# Patient Record
Sex: MALE | Race: WHITE | Employment: OTHER | URBAN - METROPOLITAN AREA
[De-identification: names, ages, dates, MRNs, and addresses within clinical notes are randomized per-mention and may not be internally consistent; named-entity substitution may affect disease eponyms.]

---

## 2018-07-18 RX ORDER — VITAMIN E 268 MG
400 CAPSULE ORAL DAILY
COMMUNITY

## 2018-07-18 RX ORDER — M-VIT,TX,IRON,MINS/CALC/FOLIC 27MG-0.4MG
1 TABLET ORAL DAILY
COMMUNITY
End: 2022-08-16

## 2018-07-18 RX ORDER — RANITIDINE 150 MG/1
150 CAPSULE ORAL 2 TIMES DAILY
COMMUNITY
End: 2022-08-16

## 2018-07-18 RX ORDER — ASCORBIC ACID 500 MG
500 TABLET ORAL DAILY
COMMUNITY

## 2018-07-20 ENCOUNTER — HOSPITAL ENCOUNTER (OUTPATIENT)
Age: 83
Discharge: HOME | End: 2018-07-20
Payer: COMMERCIAL

## 2018-07-20 LAB
EKG ATRIAL RATE: 99 BPM
EKG P-R INTERVAL: 146 MS
EKG Q-T INTERVAL: 330 MS
EKG QRS DURATION: 76 MS
EKG QTC CALCULATION (BAZETT): 423 MS
EKG R AXIS: -21 DEGREES
EKG T AXIS: -5 DEGREES
EKG VENTRICULAR RATE: 99 BPM

## 2018-07-20 PROCEDURE — 93005 ELECTROCARDIOGRAM TRACING: CPT | Performed by: ANESTHESIOLOGY

## 2018-07-24 ENCOUNTER — ANESTHESIA EVENT (OUTPATIENT)
Dept: OPERATING ROOM | Age: 83
End: 2018-07-24
Payer: COMMERCIAL

## 2018-07-24 ASSESSMENT — LIFESTYLE VARIABLES: SMOKING_STATUS: 0

## 2018-07-24 NOTE — ANESTHESIA PRE PROCEDURE
Department of Anesthesiology  Preprocedure Note       Name:  Shon Boggs   Age:  80 y.o.  :  1934                                          MRN:  [id number]         Date:  2018      Surgeon: Abrahan Melgoza):  Kvng Thomson DPM    Procedure: Procedure(s):  FUSION 1ST MPJ RIGHT FOOT    Medications prior to admission:   Prior to Admission medications    Medication Sig Start Date End Date Taking? Authorizing Provider   Multiple Vitamins-Minerals (THERAPEUTIC MULTIVITAMIN-MINERALS) tablet Take 1 tablet by mouth daily   Yes Historical Provider, MD   Calcium-Magnesium-Vitamin D (CALCIUM 1200+D3 PO) Take by mouth 2 times daily   Yes Historical Provider, MD   vitamin E 400 UNIT capsule Take 400 Units by mouth daily   Yes Historical Provider, MD   vitamin B-12 (CYANOCOBALAMIN) 250 MCG tablet Take 250 mcg by mouth daily   Yes Historical Provider, MD   Biotin 55572 MCG TBDP Take by mouth daily   Yes Historical Provider, MD   Garlic 4201 MG CAPS Take by mouth daily   Yes Historical Provider, MD   Omega 3-6-9 Fatty Acids (TRIPLE OMEGA-3-6-9) CAPS Take by mouth daily   Yes Historical Provider, MD   Misc Natural Products (GLUCOS-CHONDROIT-MSM COMPLEX PO) Take by mouth daily   Yes Historical Provider, MD   vitamin C (ASCORBIC ACID) 500 MG tablet Take 500 mg by mouth daily   Yes Historical Provider, MD   ranitidine (ZANTAC) 150 MG capsule Take 150 mg by mouth 2 times daily   Yes Historical Provider, MD       Current medications:    No current facility-administered medications for this encounter.       Current Outpatient Prescriptions   Medication Sig Dispense Refill    Multiple Vitamins-Minerals (THERAPEUTIC MULTIVITAMIN-MINERALS) tablet Take 1 tablet by mouth daily      Calcium-Magnesium-Vitamin D (CALCIUM 1200+D3 PO) Take by mouth 2 times daily      vitamin E 400 UNIT capsule Take 400 Units by mouth daily      vitamin B-12 (CYANOCOBALAMIN) 250 MCG tablet Take 250 mcg by mouth daily      Biotin 52766 MCG TBDP Take by mouth daily      Garlic 2031 MG CAPS Take by mouth daily      Omega 3-6-9 Fatty Acids (TRIPLE OMEGA-3-6-9) CAPS Take by mouth daily      Misc Natural Products (GLUCOS-CHONDROIT-MSM COMPLEX PO) Take by mouth daily      vitamin C (ASCORBIC ACID) 500 MG tablet Take 500 mg by mouth daily      ranitidine (ZANTAC) 150 MG capsule Take 150 mg by mouth 2 times daily         Allergies: Allergies   Allergen Reactions    Bee Venom Anaphylaxis       Problem List:  There is no problem list on file for this patient. Past Medical History:        Diagnosis Date    Arthritis     GERD (gastroesophageal reflux disease)        Past Surgical History:        Procedure Laterality Date    COLECTOMY      for polyps    COLONOSCOPY      ELBOW SURGERY      tendon reattachment    EYE SURGERY Bilateral     cataracts    TURP         Social History:    Social History   Substance Use Topics    Smoking status: Former Smoker     Years: 10.00     Types: Pipe    Smokeless tobacco: Never Used    Alcohol use Yes      Comment: occas                                Counseling given: Not Answered      Vital Signs (Current):   Vitals:    07/18/18 1328 07/18/18 1331   Weight: 224 lb (101.6 kg) 225 lb (102.1 kg)   Height: 5' 8\" (1.727 m)                                               BP Readings from Last 3 Encounters:   09/27/17 131/81       NPO Status:                                                                                 BMI:   Wt Readings from Last 3 Encounters:   09/27/17 232 lb (105.2 kg)     Body mass index is 34.21 kg/m². CBC: No results found for: WBC, RBC, HGB, HCT, MCV, RDW, PLT    CMP: No results found for: NA, K, CL, CO2, BUN, CREATININE, GFRAA, AGRATIO, LABGLOM, GLUCOSE, PROT, CALCIUM, BILITOT, ALKPHOS, AST, ALT    POC Tests: No results for input(s): POCGLU, POCNA, POCK, POCCL, POCBUN, POCHEMO, POCHCT in the last 72 hours.     Coags: No results found for: PROTIME, INR, APTT    HCG (If Applicable): No results found for: PREGTESTUR, PREGSERUM, HCG, HCGQUANT     ABGs: No results found for: PHART, PO2ART, DUH3EKF, MRM0PBC, BEART, A9EDWOTP     Type & Screen (If Applicable):  No results found for: [facility]    Anesthesia Evaluation  Patient summary reviewed  Airway: Mallampati: II  TM distance: >3 FB   Neck ROM: full  Mouth opening: > = 3 FB Dental:    (+) upper dentures and lower dentures      Pulmonary: breath sounds clear to auscultation      (-) COPD and not a current smoker (ex 10 yr smoker)                          ROS comment: Former smoker   Cardiovascular:  Exercise tolerance: good (>4 METS),       (-) hypertension, past MI and CAD    ECG reviewed  Rhythm: regular  Rate: normal                 ROS comment: EKG=Sinus rhythm with occasional premature ventricular complexes  Otherwise normal ECG  No previous ECGs available  Confirmed by Iraida Rehman ([id number]) on 7/20/2018 10:56:59 PM    cleared by PCP     Neuro/Psych:   Negative Neuro/Psych ROS              GI/Hepatic/Renal:   (+) GERD: well controlled,          ROS comment: Colon resection  TURP. Endo/Other:    (+) : arthritis:., .                 Abdominal:   (+) obese,         Vascular: negative vascular ROS. Anesthesia Plan      MAC     ASA 3     (Medical clearance on chart)  Induction: intravenous. MIPS: Postoperative opioids intended and Prophylactic antiemetics administered. Anesthetic plan and risks discussed with patient and spouse. Plan discussed with CRNA. Laura Mulligan MD   7/24/2018      DOS STAFF ADDENDUM:    Pt seen and examined, chart reviewed (including anesthesia, drug and allergy history). Anesthetic plan, risks, benefits, alternatives, and personnel involved discussed with patient. Patient verbalized an understanding and agrees to proceed. Plan discussed with care team members and agreed upon.     Idalia Emmanuel MD  Staff Anesthesiologist  7:12 AM

## 2018-07-25 ENCOUNTER — HOSPITAL ENCOUNTER (OUTPATIENT)
Age: 83
Setting detail: OUTPATIENT SURGERY
Discharge: HOME | End: 2018-07-25
Attending: PODIATRIST | Admitting: PODIATRIST
Payer: COMMERCIAL

## 2018-07-25 ENCOUNTER — ANESTHESIA (OUTPATIENT)
Dept: OPERATING ROOM | Age: 83
End: 2018-07-25
Payer: COMMERCIAL

## 2018-07-25 VITALS
DIASTOLIC BLOOD PRESSURE: 80 MMHG | RESPIRATION RATE: 18 BRPM | SYSTOLIC BLOOD PRESSURE: 120 MMHG | OXYGEN SATURATION: 97 %

## 2018-07-25 VITALS
WEIGHT: 224 LBS | BODY MASS INDEX: 33.95 KG/M2 | RESPIRATION RATE: 14 BRPM | HEART RATE: 86 BPM | DIASTOLIC BLOOD PRESSURE: 93 MMHG | TEMPERATURE: 98.6 F | OXYGEN SATURATION: 98 % | SYSTOLIC BLOOD PRESSURE: 156 MMHG | HEIGHT: 68 IN

## 2018-07-25 DIAGNOSIS — M20.5X1 HALLUX LIMITUS, RIGHT: Primary | ICD-10-CM

## 2018-07-25 DIAGNOSIS — M19.079 ARTHRITIS OF BIG TOE: ICD-10-CM

## 2018-07-25 DIAGNOSIS — M10.9 GOUTY ARTHRITIS OF TOE OF RIGHT FOOT: ICD-10-CM

## 2018-07-25 PROCEDURE — 3600000013 HC SURGERY LEVEL 3 ADDTL 15MIN: Performed by: PODIATRIST

## 2018-07-25 PROCEDURE — 6360000002 HC RX W HCPCS: Performed by: PODIATRIST

## 2018-07-25 PROCEDURE — 2500000003 HC RX 250 WO HCPCS: Performed by: PODIATRIST

## 2018-07-25 PROCEDURE — 2580000003 HC RX 258: Performed by: PODIATRIST

## 2018-07-25 PROCEDURE — 2580000003 HC RX 258: Performed by: ANESTHESIOLOGY

## 2018-07-25 PROCEDURE — 3600000003 HC SURGERY LEVEL 3 BASE: Performed by: PODIATRIST

## 2018-07-25 PROCEDURE — L4387 NON-PNEUM WALK BOOT PRE OTS: HCPCS | Performed by: PODIATRIST

## 2018-07-25 PROCEDURE — 88311 DECALCIFY TISSUE: CPT

## 2018-07-25 PROCEDURE — 88304 TISSUE EXAM BY PATHOLOGIST: CPT

## 2018-07-25 PROCEDURE — 3700000000 HC ANESTHESIA ATTENDED CARE: Performed by: PODIATRIST

## 2018-07-25 PROCEDURE — 93005 ELECTROCARDIOGRAM TRACING: CPT | Performed by: ANESTHESIOLOGY

## 2018-07-25 PROCEDURE — 3700000001 HC ADD 15 MINUTES (ANESTHESIA): Performed by: PODIATRIST

## 2018-07-25 PROCEDURE — 7100000011 HC PHASE II RECOVERY - ADDTL 15 MIN: Performed by: PODIATRIST

## 2018-07-25 PROCEDURE — 7100000010 HC PHASE II RECOVERY - FIRST 15 MIN: Performed by: PODIATRIST

## 2018-07-25 PROCEDURE — 2709999900 HC NON-CHARGEABLE SUPPLY: Performed by: PODIATRIST

## 2018-07-25 PROCEDURE — 2720000010 HC SURG SUPPLY STERILE: Performed by: PODIATRIST

## 2018-07-25 PROCEDURE — 6370000000 HC RX 637 (ALT 250 FOR IP): Performed by: ANESTHESIOLOGY

## 2018-07-25 PROCEDURE — 6360000002 HC RX W HCPCS: Performed by: NURSE ANESTHETIST, CERTIFIED REGISTERED

## 2018-07-25 PROCEDURE — C1713 ANCHOR/SCREW BN/BN,TIS/BN: HCPCS | Performed by: PODIATRIST

## 2018-07-25 PROCEDURE — 2500000003 HC RX 250 WO HCPCS: Performed by: NURSE ANESTHETIST, CERTIFIED REGISTERED

## 2018-07-25 DEVICE — LOW PRO CORT SCREW 2.7X18MM ORTHOLOC™ SYSTEM
Type: IMPLANTABLE DEVICE | Status: FUNCTIONAL
Brand: ORTHOLOC

## 2018-07-25 DEVICE — IMPLANTABLE DEVICE
Type: IMPLANTABLE DEVICE | Status: FUNCTIONAL
Brand: ORTHOLOC 3DI

## 2018-07-25 DEVICE — IMPLANTABLE DEVICE
Type: IMPLANTABLE DEVICE | Status: FUNCTIONAL
Brand: ORTHOLOC

## 2018-07-25 RX ORDER — KETOROLAC TROMETHAMINE 30 MG/ML
INJECTION, SOLUTION INTRAMUSCULAR; INTRAVENOUS PRN
Status: DISCONTINUED | OUTPATIENT
Start: 2018-07-25 | End: 2018-07-25 | Stop reason: SDUPTHER

## 2018-07-25 RX ORDER — MIDAZOLAM HYDROCHLORIDE 1 MG/ML
INJECTION INTRAMUSCULAR; INTRAVENOUS PRN
Status: DISCONTINUED | OUTPATIENT
Start: 2018-07-25 | End: 2018-07-25 | Stop reason: SDUPTHER

## 2018-07-25 RX ORDER — OXYCODONE HYDROCHLORIDE AND ACETAMINOPHEN 5; 325 MG/1; MG/1
1 TABLET ORAL ONCE
Status: COMPLETED | OUTPATIENT
Start: 2018-07-25 | End: 2018-07-25

## 2018-07-25 RX ORDER — LIDOCAINE HYDROCHLORIDE 20 MG/ML
INJECTION, SOLUTION EPIDURAL; INFILTRATION; INTRACAUDAL; PERINEURAL PRN
Status: DISCONTINUED | OUTPATIENT
Start: 2018-07-25 | End: 2018-07-25 | Stop reason: SDUPTHER

## 2018-07-25 RX ORDER — PROPOFOL 10 MG/ML
INJECTION, EMULSION INTRAVENOUS CONTINUOUS PRN
Status: DISCONTINUED | OUTPATIENT
Start: 2018-07-25 | End: 2018-07-25 | Stop reason: SDUPTHER

## 2018-07-25 RX ORDER — FENTANYL CITRATE 50 UG/ML
INJECTION, SOLUTION INTRAMUSCULAR; INTRAVENOUS PRN
Status: DISCONTINUED | OUTPATIENT
Start: 2018-07-25 | End: 2018-07-25 | Stop reason: SDUPTHER

## 2018-07-25 RX ORDER — OXYCODONE HYDROCHLORIDE AND ACETAMINOPHEN 5; 325 MG/1; MG/1
1 TABLET ORAL EVERY 4 HOURS PRN
Qty: 35 TABLET | Refills: 0 | Status: SHIPPED | OUTPATIENT
Start: 2018-07-25 | End: 2018-08-01

## 2018-07-25 RX ORDER — SODIUM CHLORIDE, SODIUM LACTATE, POTASSIUM CHLORIDE, CALCIUM CHLORIDE 600; 310; 30; 20 MG/100ML; MG/100ML; MG/100ML; MG/100ML
INJECTION, SOLUTION INTRAVENOUS CONTINUOUS
Status: DISCONTINUED | OUTPATIENT
Start: 2018-07-25 | End: 2018-07-25 | Stop reason: HOSPADM

## 2018-07-25 RX ADMIN — FENTANYL CITRATE 50 MCG: 50 INJECTION, SOLUTION INTRAMUSCULAR; INTRAVENOUS at 07:20

## 2018-07-25 RX ADMIN — FENTANYL CITRATE 25 MCG: 50 INJECTION, SOLUTION INTRAMUSCULAR; INTRAVENOUS at 08:03

## 2018-07-25 RX ADMIN — SODIUM CHLORIDE, POTASSIUM CHLORIDE, SODIUM LACTATE AND CALCIUM CHLORIDE: 600; 310; 30; 20 INJECTION, SOLUTION INTRAVENOUS at 06:32

## 2018-07-25 RX ADMIN — FENTANYL CITRATE 25 MCG: 50 INJECTION, SOLUTION INTRAMUSCULAR; INTRAVENOUS at 07:45

## 2018-07-25 RX ADMIN — OXYCODONE HYDROCHLORIDE AND ACETAMINOPHEN 1 TABLET: 5; 325 TABLET ORAL at 08:55

## 2018-07-25 RX ADMIN — LIDOCAINE HYDROCHLORIDE 20 MG: 20 INJECTION, SOLUTION EPIDURAL; INFILTRATION; INTRACAUDAL; PERINEURAL at 07:20

## 2018-07-25 RX ADMIN — MIDAZOLAM 1 MG: 1 INJECTION INTRAMUSCULAR; INTRAVENOUS at 07:20

## 2018-07-25 RX ADMIN — PROPOFOL 50 MCG/KG/MIN: 10 INJECTION, EMULSION INTRAVENOUS at 07:20

## 2018-07-25 RX ADMIN — WATER 2 G: 1 INJECTION INTRAMUSCULAR; INTRAVENOUS; SUBCUTANEOUS at 07:20

## 2018-07-25 RX ADMIN — KETOROLAC TROMETHAMINE 15 MG: 30 INJECTION, SOLUTION INTRAMUSCULAR at 08:16

## 2018-07-25 ASSESSMENT — PULMONARY FUNCTION TESTS
PIF_VALUE: 0

## 2018-07-25 ASSESSMENT — PAIN SCALES - GENERAL
PAINLEVEL_OUTOF10: 0
PAINLEVEL_OUTOF10: 3
PAINLEVEL_OUTOF10: 0
PAINLEVEL_OUTOF10: 2

## 2018-07-25 ASSESSMENT — PAIN DESCRIPTION - PAIN TYPE: TYPE: SURGICAL PAIN

## 2018-07-25 ASSESSMENT — PAIN DESCRIPTION - FREQUENCY: FREQUENCY: CONTINUOUS

## 2018-07-25 ASSESSMENT — PAIN DESCRIPTION - ORIENTATION: ORIENTATION: RIGHT

## 2018-07-25 ASSESSMENT — PAIN - FUNCTIONAL ASSESSMENT: PAIN_FUNCTIONAL_ASSESSMENT: 0-10

## 2018-07-25 ASSESSMENT — PAIN DESCRIPTION - LOCATION: LOCATION: FOOT

## 2018-07-25 ASSESSMENT — PAIN DESCRIPTION - DESCRIPTORS: DESCRIPTORS: ACHING

## 2018-07-25 NOTE — ANESTHESIA POSTPROCEDURE EVALUATION
Department of Anesthesiology  Postprocedure Note    Patient: Peri Roque  MRN: [id number]  YOB: 1934  Date of evaluation: 7/25/2018  Time:  9:53 AM     Procedure Summary     Date:  07/25/18 Room / Location:  [address] [place] [address] 01 / [address] [facility]    Anesthesia Start:  0720 Anesthesia Stop:  Lakia Gomez    Procedure:  FUSION 1ST MPJ RIGHT FOOT (Right ) Diagnosis:  ([address] RIGIDUS)    Surgeon:  Kenzie Jeffrey DPM Responsible Provider:  JANUSZ Gibbs CRNA    Anesthesia Type:  MAC ASA Status:  3          Anesthesia Type: MAC    Marcell Phase I: Marcell Score: 10    Marcell Phase II: Marcell Score: 10    Last vitals: Reviewed and per EMR flowsheets.        Anesthesia Post Evaluation    Patient location during evaluation: PACU  Patient participation: complete - patient participated  Level of consciousness: awake and alert  Airway patency: patent  Nausea & Vomiting: no vomiting and no nausea  Complications: no  Cardiovascular status: hemodynamically stable  Respiratory status: acceptable  Hydration status: stable

## 2018-07-25 NOTE — BRIEF OP NOTE
Brief Postoperative Note  ______________________________________________________________    Patient: Jennyfer cOhoa  YOB: 1934  MRN: [id number]  Date of Procedure: 7/25/2018    Pre-Op Diagnosis: HALLUX RIGIDUS    Post-Op Diagnosis: Same, gout       Procedure(s):  FUSION 1ST MPJ RIGHT FOOT    Anesthesia: Monitor Anesthesia Care    Surgeon(s):  Evan Gómez DPM    Staff:  Scrub Person First: Kym Bateman     Estimated Blood Loss: * No values recorded between 7/25/2018  7:20 AM and 7/25/2018  5:30 AM * mL    Complications: None    Specimens:   ID Type Source Tests Collected by Time Destination   A : RIGHT FOOT GOUT CRYSTALS IN ALCOHOL Tissue Foot SURGICAL PATHOLOGY Evan Gómez DPM 7/25/2018 0744        Implants:    Implant Name Type Inv.  Item Serial No.  Lot No. LRB No. Used   MTP FUSION PLATE 5 INCHES Leg/Ankle/Foot/Toe MTP FUSION PLATE 5 INCHES  [employer]  Right 1   SCREW LK 2.7X16MM Screw/Plate/Nail/Nader SCREW LK 2.7X16MM  [employer]  Right 1   SCREW NON-LK 2.7X18MM Screw/Plate/Nail/Nader SCREW NON-LK 2.7X18MM  [employer]  Right 1   SCREW LPROF 3.5X20MM Screw/Plate/Nail/Nader SCREW LPROF 3.5X20MM  [employer]  Right 1   SCREW ORTHOLOC LK 3.5X18MM Screw/Plate/Nail/Nader SCREW ORTHOLOC LK 3.5X18MM  [employer]  Right 2   SCREW LK 2.7X14MM Screw/Plate/Nail/Nader SCREW LK 2.7X14MM   [employer]   Right 1         Drains:      Findings: consistent with diagnosis    Evan Arora DPM  Date: 7/25/2018  Time: 8:33 AM

## 2018-07-25 NOTE — OP NOTE
[address]                                 OPERATIVE REPORT    PATIENT NAME: Darryn Pemberton                      :        1934  MED REC NO:   [id number]                            ROOM:  ACCOUNT NO:   [de-identified]                           ADMIT DATE: 2018  PROVIDER:     Nacho Ferreira Dpm    DATE OF PROCEDURE:  2018    PREOPERATIVE DIAGNOSES:  1. Hallux rigidus, right foot. 2.  Gouty arthritis. POSTOPERATIVE DIAGNOSES:  1. Hallux rigidus, right foot. 2.  Gouty arthritis. PROCEDURES PERFORMED:  First metatarsophalangeal joint fusion with bone  biopsy to confirm gout. SURGEON:  Nacho Ferreira DPM.    ASSISTANT:  None. ANESTHESIA:  Local with IV sedation. OPERATIVE PROCEDURE:  The patient presented to the operating room and  placed on the operating table in the supine position. The patient  underwent IV sedation, which was administered by the Department of  Anesthesiology of the [facility]. Once the patient was  sedated, the patient's foot was localized by the surgeon comprising of an  equal mixture of 0.5% Marcaine plain along with 2% lidocaine plain totaling  10 mL, after which time, the patient's foot was prepared, scrubbed and  draped in a sterile fashion. No epinephrine was utilized as injection, but  we did use an ankle tourniquet at a pressure of 250 mmHg pressure. Total  tourniquet time was approximately 50 minutes. Attention was first directed to the right foot, where a linear incision  paralleling the extensor hallucis longus tendon was made with #10 blade. This incision was then deepened utilizing both sharp and blunt dissection,  being sure to identify and preserve all neurovascular and tendinous  structures in the area. This incision was deepened allowing exposure to  the capsule. A linear incision in the capsule was made with 15 blade.

## 2022-08-05 ENCOUNTER — TELEPHONE (OUTPATIENT)
Dept: PRIMARY CARE CLINIC | Age: 87
End: 2022-08-05

## 2022-08-05 DIAGNOSIS — E78.2 MIXED HYPERLIPIDEMIA: Primary | ICD-10-CM

## 2022-08-05 NOTE — TELEPHONE ENCOUNTER
Patient had labs drawn 08/04/2022 [facility]. This can be viewed in care everywhere. He is asking if there are any additional labs you require prior to his 08/16/2022 appt. Please advise.  Thank you

## 2022-08-09 LAB
ALBUMIN SERPL-MCNC: 4.1 G/DL (ref 3.5–5.2)
ALP BLD-CCNC: 68 U/L (ref 40–129)
ALT SERPL-CCNC: 42 U/L (ref 0–40)
ANION GAP SERPL CALCULATED.3IONS-SCNC: 13 MMOL/L (ref 7–16)
AST SERPL-CCNC: 35 U/L (ref 0–39)
BILIRUB SERPL-MCNC: 0.8 MG/DL (ref 0–1.2)
BUN BLDV-MCNC: 22 MG/DL (ref 6–23)
CALCIUM SERPL-MCNC: 9.4 MG/DL (ref 8.6–10.2)
CHLORIDE BLD-SCNC: 106 MMOL/L (ref 98–107)
CHOLESTEROL, TOTAL: 100 MG/DL (ref 0–199)
CO2: 21 MMOL/L (ref 22–29)
CREAT SERPL-MCNC: 0.9 MG/DL (ref 0.7–1.2)
GFR AFRICAN AMERICAN: >60
GFR NON-AFRICAN AMERICAN: >60 ML/MIN/1.73
GLUCOSE BLD-MCNC: 94 MG/DL (ref 74–99)
HCT VFR BLD CALC: 47.1 % (ref 37–54)
HDLC SERPL-MCNC: 37 MG/DL
HEMOGLOBIN: 15.4 G/DL (ref 12.5–16.5)
LDL CHOLESTEROL CALCULATED: 49 MG/DL (ref 0–99)
MCH RBC QN AUTO: 32.6 PG (ref 26–35)
MCHC RBC AUTO-ENTMCNC: 32.7 % (ref 32–34.5)
MCV RBC AUTO: 99.6 FL (ref 80–99.9)
PDW BLD-RTO: 13.9 FL (ref 11.5–15)
PLATELET # BLD: 131 E9/L (ref 130–450)
PMV BLD AUTO: 13.3 FL (ref 7–12)
POTASSIUM SERPL-SCNC: 4.6 MMOL/L (ref 3.5–5)
RBC # BLD: 4.73 E12/L (ref 3.8–5.8)
SODIUM BLD-SCNC: 140 MMOL/L (ref 132–146)
TOTAL PROTEIN: 6.7 G/DL (ref 6.4–8.3)
TRIGL SERPL-MCNC: 71 MG/DL (ref 0–149)
VLDLC SERPL CALC-MCNC: 14 MG/DL
WBC # BLD: 9.9 E9/L (ref 4.5–11.5)

## 2022-08-16 ENCOUNTER — OFFICE VISIT (OUTPATIENT)
Dept: PRIMARY CARE CLINIC | Age: 87
End: 2022-08-16
Payer: MEDICARE

## 2022-08-16 VITALS
OXYGEN SATURATION: 97 % | HEIGHT: 68 IN | HEART RATE: 94 BPM | DIASTOLIC BLOOD PRESSURE: 70 MMHG | SYSTOLIC BLOOD PRESSURE: 122 MMHG | TEMPERATURE: 98.1 F | BODY MASS INDEX: 32.37 KG/M2 | WEIGHT: 213.6 LBS

## 2022-08-16 DIAGNOSIS — G47.9 SLEEP DISORDER, UNSPECIFIED: ICD-10-CM

## 2022-08-16 DIAGNOSIS — G57.90 NEUROPATHY OF LOWER EXTREMITY, UNSPECIFIED LATERALITY: Primary | ICD-10-CM

## 2022-08-16 PROCEDURE — 1123F ACP DISCUSS/DSCN MKR DOCD: CPT | Performed by: INTERNAL MEDICINE

## 2022-08-16 PROCEDURE — 99214 OFFICE O/P EST MOD 30 MIN: CPT | Performed by: INTERNAL MEDICINE

## 2022-08-16 RX ORDER — TRAMADOL HYDROCHLORIDE 50 MG/1
1 TABLET ORAL 3 TIMES DAILY
COMMUNITY
Start: 2022-07-14 | End: 2022-08-16 | Stop reason: SDUPTHER

## 2022-08-16 RX ORDER — EPINEPHRINE 0.3 MG/.3ML
0.3 INJECTION SUBCUTANEOUS PRN
COMMUNITY

## 2022-08-16 RX ORDER — GABAPENTIN 300 MG/1
1 CAPSULE ORAL 2 TIMES DAILY
COMMUNITY
Start: 2021-03-09 | End: 2022-08-16

## 2022-08-16 RX ORDER — OMEPRAZOLE 20 MG/1
1 CAPSULE, DELAYED RELEASE ORAL DAILY
COMMUNITY
End: 2022-08-16

## 2022-08-16 RX ORDER — TAMSULOSIN HYDROCHLORIDE 0.4 MG/1
1 CAPSULE ORAL NIGHTLY
COMMUNITY
Start: 2022-06-13

## 2022-08-16 RX ORDER — TRAMADOL HYDROCHLORIDE 50 MG/1
50 TABLET ORAL EVERY 8 HOURS PRN
Qty: 90 TABLET | Refills: 3 | Status: SHIPPED | OUTPATIENT
Start: 2022-08-16 | End: 2022-09-15

## 2022-08-16 RX ORDER — APIXABAN 5 MG/1
1 TABLET, FILM COATED ORAL DAILY
COMMUNITY
Start: 2022-06-02 | End: 2022-09-06 | Stop reason: SDUPTHER

## 2022-08-16 SDOH — ECONOMIC STABILITY: FOOD INSECURITY: WITHIN THE PAST 12 MONTHS, YOU WORRIED THAT YOUR FOOD WOULD RUN OUT BEFORE YOU GOT MONEY TO BUY MORE.: NEVER TRUE

## 2022-08-16 SDOH — ECONOMIC STABILITY: FOOD INSECURITY: WITHIN THE PAST 12 MONTHS, THE FOOD YOU BOUGHT JUST DIDN'T LAST AND YOU DIDN'T HAVE MONEY TO GET MORE.: NEVER TRUE

## 2022-08-16 ASSESSMENT — ENCOUNTER SYMPTOMS
GASTROINTESTINAL NEGATIVE: 1
ALLERGIC/IMMUNOLOGIC NEGATIVE: 1
EYES NEGATIVE: 1
SINUS PAIN: 0
EYE PAIN: 0
FACIAL SWELLING: 0
EYE DISCHARGE: 0
EYE REDNESS: 0
RHINORRHEA: 0
RESPIRATORY NEGATIVE: 1
EYE ITCHING: 0

## 2022-08-16 ASSESSMENT — PATIENT HEALTH QUESTIONNAIRE - PHQ9
SUM OF ALL RESPONSES TO PHQ QUESTIONS 1-9: 2
2. FEELING DOWN, DEPRESSED OR HOPELESS: 1
SUM OF ALL RESPONSES TO PHQ QUESTIONS 1-9: 2
1. LITTLE INTEREST OR PLEASURE IN DOING THINGS: 1
SUM OF ALL RESPONSES TO PHQ QUESTIONS 1-9: 2
SUM OF ALL RESPONSES TO PHQ QUESTIONS 1-9: 2
SUM OF ALL RESPONSES TO PHQ9 QUESTIONS 1 & 2: 2

## 2022-08-16 ASSESSMENT — SOCIAL DETERMINANTS OF HEALTH (SDOH): HOW HARD IS IT FOR YOU TO PAY FOR THE VERY BASICS LIKE FOOD, HOUSING, MEDICAL CARE, AND HEATING?: NOT HARD AT ALL

## 2022-08-16 NOTE — PROGRESS NOTES
Marquis Trent (:  1934) is a 80 y.o. male,Established patient, here for evaluation of the following chief complaint(s):  Check-Up      Subjective   SUBJECTIVE/OBJECTIVE:  HPI  Hypertension:  Patient is here for follow up chronic hypertension. This is  generally controlled on current medication regimen. BP Readings from Last 1 Encounters:   22 122/70        Takes meds as directed and tolerates them well. Most recent labs reviewed with patient and are not remarkable. No symptoms from htn standpoint per ROS. Patient is  compliant with lifestyle modifications. Patient does not smoke. Comorbid conditions include obesity   Reporting and excessive sleeping reported that he wakes up in the morning and goes and sits in the chair and sleeps for about 3 hours and wakes up after that rested. Wife is with him stated that he is not snoring. Symptoms has progressively getting worse. Review of Systems   Constitutional: Negative. Negative for activity change, appetite change, chills, fatigue and fever. HENT: Negative. Negative for congestion, ear pain, facial swelling, hearing loss, postnasal drip, rhinorrhea and sinus pain. Eyes: Negative. Negative for pain, discharge, redness and itching. Respiratory: Negative. Cardiovascular: Negative. Gastrointestinal: Negative. Endocrine: Negative. Genitourinary: Negative. Musculoskeletal: Negative. Skin: Negative. Allergic/Immunologic: Negative. Neurological: Negative. Hematological: Negative. Psychiatric/Behavioral: Negative.        CBC with Differential:    Lab Results   Component Value Date/Time    WBC 9.9 2022 12:00 PM    RBC 4.73 2022 12:00 PM    HGB 15.4 2022 12:00 PM    HCT 47.1 2022 12:00 PM     2022 12:00 PM    MCV 99.6 2022 12:00 PM    MCH 32.6 2022 12:00 PM    MCHC 32.7 2022 12:00 PM    RDW 13.9 2022 12:00 PM     CMP:    Lab Results   Component Value Date/Time     08/09/2022 12:00 PM    K 4.6 08/09/2022 12:00 PM     08/09/2022 12:00 PM    CO2 21 08/09/2022 12:00 PM    BUN 22 08/09/2022 12:00 PM    CREATININE 0.9 08/09/2022 12:00 PM    GFRAA >60 08/09/2022 12:00 PM    LABGLOM >60 08/09/2022 12:00 PM    GLUCOSE 94 08/09/2022 12:00 PM    PROT 6.7 08/09/2022 12:00 PM    LABALBU 4.1 08/09/2022 12:00 PM    CALCIUM 9.4 08/09/2022 12:00 PM    BILITOT 0.8 08/09/2022 12:00 PM    ALKPHOS 68 08/09/2022 12:00 PM    AST 35 08/09/2022 12:00 PM    ALT 42 08/09/2022 12:00 PM     HgBA1c:  No results found for: LABA1C  Lipid:   Lab Results   Component Value Date    CHOL 100 08/09/2022     Lab Results   Component Value Date    TRIG 71 08/09/2022     Lab Results   Component Value Date    HDL 37 08/09/2022     Lab Results   Component Value Date    LDLCALC 49 08/09/2022     Lab Results   Component Value Date    LABVLDL 14 08/09/2022     No results found for: CHOLHDLRATIO         Objective   /70   Pulse 94   Temp 98.1 °F (36.7 °C) (Temporal)   Ht 5' 8\" (1.727 m)   Wt 213 lb 9.6 oz (96.9 kg)   SpO2 97%   BMI 32.48 kg/m²   Current Outpatient Medications   Medication Sig Dispense Refill    ELIQUIS 5 MG TABS tablet Take 1 tablet by mouth daily      metoprolol tartrate (LOPRESSOR) 25 MG tablet Take 0.5 tablets by mouth in the morning and at bedtime      tamsulosin (FLOMAX) 0.4 MG capsule Take 1 capsule by mouth at bedtime      EPINEPHrine (EPIPEN) 0.3 MG/0.3ML SOAJ injection Inject 0.3 mg into the muscle as needed Use as directed for allergic reaction      traMADol (ULTRAM) 50 MG tablet Take 1 tablet by mouth every 8 hours as needed for Pain for up to 30 days.  90 tablet 3    Calcium-Magnesium-Vitamin D (CALCIUM 1200+D3 PO) Take by mouth 2 times daily      vitamin E 400 UNIT capsule Take 400 Units by mouth daily      vitamin B-12 (CYANOCOBALAMIN) 250 MCG tablet Take 250 mcg by mouth daily      Biotin 31326 MCG TBDP Take by mouth daily      Garlic 8122 MG CAPS Take by mouth daily      Omega 3-6-9 Fatty Acids (TRIPLE OMEGA-3-6-9) CAPS Take by mouth daily      vitamin C (ASCORBIC ACID) 500 MG tablet Take 500 mg by mouth daily       No current facility-administered medications for this visit. Allergies   Allergen Reactions    Bee Venom Anaphylaxis        Past Medical History:   Diagnosis Date    Arthritis     GERD (gastroesophageal reflux disease)      Past Surgical History:   Procedure Laterality Date    CARPAL TUNNEL RELEASE Right 2017    CARPAL TUNNEL RELEASE Left 2017    COLECTOMY      for polyps    COLONOSCOPY      ELBOW SURGERY      tendon reattachment    EYE SURGERY Bilateral     cataracts    FOOT SURGERY Right 2018    fusion 1st mpj right foot    OK OSTEOTOMY 1ST METATARSAL,BASE/SHAFT Right 2018    FUSION 1ST MPJ RIGHT FOOT performed by Kaitlynn Espino DPM at [address] [place] [address]       History reviewed. No pertinent family history.    Social History     Socioeconomic History    Marital status:      Spouse name: Not on file    Number of children: Not on file    Years of education: Not on file    Highest education level: Not on file   Occupational History    Not on file   Tobacco Use    Smoking status: Former     Types: Pipe     Quit date: 18     Years since quittin.6    Smokeless tobacco: Never   Substance and Sexual Activity    Alcohol use: Yes     Comment: occas    Drug use: No    Sexual activity: Not on file   Other Topics Concern    Not on file   Social History Narrative    Not on file     Social Determinants of Health     Financial Resource Strain: Low Risk     Difficulty of Paying Living Expenses: Not hard at all   Food Insecurity: No Food Insecurity    Worried About Running Out of Food in the Last Year: Never true    Ran Out of Food in the Last Year: Never true   Transportation Needs: Not on file   Physical Activity: Not on file   Stress: Not on file   Social Connections: Not on file   Intimate Partner Violence: Not on file adenopathy. Skin:     General: Skin is warm and dry. Capillary Refill: Capillary refill takes 2 to 3 seconds. Findings: No bruising, lesion or rash. Neurological:      General: No focal deficit present. Mental Status: He is alert and oriented to person, place, and time. Sensory: No sensory deficit. Motor: No weakness. Gait: Gait normal.   Psychiatric:         Mood and Affect: Mood normal.         Behavior: Behavior normal.         Thought Content: Thought content normal.         Judgment: Judgment normal.             ASSESSMENT/PLAN:  1. Neuropathy of lower extremity, unspecified laterality  -     traMADol (ULTRAM) 50 MG tablet; Take 1 tablet by mouth every 8 hours as needed for Pain for up to 30 days. , Disp-90 tablet, R-3Normal      No follow-ups on file. An electronic signature was used to authenticate this note.     --Sorin Arizmendi MD

## 2022-08-19 ENCOUNTER — TELEPHONE (OUTPATIENT)
Dept: SLEEP CENTER | Age: 87
End: 2022-08-19

## 2022-09-06 RX ORDER — APIXABAN 5 MG/1
5 TABLET, FILM COATED ORAL 2 TIMES DAILY
Qty: 180 TABLET | Refills: 0 | Status: SHIPPED | OUTPATIENT
Start: 2022-09-06 | End: 2022-12-05

## 2022-09-28 ENCOUNTER — HOSPITAL ENCOUNTER (OUTPATIENT)
Dept: SLEEP CENTER | Age: 87
Discharge: HOME | End: 2022-09-28
Payer: MEDICARE

## 2022-09-28 PROCEDURE — 95800 SLP STDY UNATTENDED: CPT

## 2022-10-06 PROCEDURE — 95806 SLEEP STUDY UNATT&RESP EFFT: CPT | Performed by: INTERNAL MEDICINE

## 2022-10-06 NOTE — PROCEDURES
non-supine position for 0.0 minutes (0.0% of the total sleep time), and the non-supine pRDI was N/A. The 4% oxygen desaturation index (CL) was 45.3. The average oxygen saturation was 95%. The lowest oxygen saturation  was 86%. Oxygen saturations were less than or equal to 88% for 1.3 minutes or 0.3% of the total oxygen saturation evaluation period. Snoring occurred for 14.2 minutes, or 3.2% of the total valid sleep time. PULSE: The average heart rate during recording was 73 beats per minute. IMPRESSION:   1. This study is consistent with severe obstructive sleep apnea. Of note, the severity of this patient's sleep disordered breathing was likely underestimated as home sleep studies are inherently less sensitive. RECOMMENDATIONS:    1. This patient did have significant oxygen desaturations with respiratory events but recovered well between events. Auto-CPAP therapy at settings of 5-20 cm of water is recommended and should be ordered by the referring provider. Equipment ordering information is below. 2.  Per insurance requirements, the patient should be seen in clinical follow up within 3 months of receiving auto-CPAP therapy in order to document adherence to therapy, assess efficacy of the prescribed settings (as based upon a residual AHI of less than or equal to 5 on the device's remote download), and evaluate resolution of sleep-related complaints. 3.  The patient should be strongly counseled against driving while drowsy. 4.  Weight loss efforts should be encouraged, as the severity of obstructive sleep apnea may improve although no guarantee of cure can be made. 5.  The patient may be referred to the [facility] for ongoing management of obstructive sleep apnea and PAP therapy, if the referring provider so desires. EQUIPMENT ORDERING INFORMATION:  DEVICE:  Auto CPAP with heated humidification and a remote modem. SETTINGS:  5 to 20 cm of water with EPR of 3.     MASK TYPE:  Full-face mask, or alternative as chosen by patient. MASK SIZE:  To be fit by DME. SUPPLEMENTAL OXYGEN:  None.

## 2022-10-12 ENCOUNTER — TELEPHONE (OUTPATIENT)
Dept: SLEEP CENTER | Age: 87
End: 2022-10-12

## 2022-11-17 RX ORDER — OMEPRAZOLE 20 MG/1
1 CAPSULE, DELAYED RELEASE ORAL DAILY
COMMUNITY

## 2022-11-17 RX ORDER — GABAPENTIN 300 MG/1
1 CAPSULE ORAL 3 TIMES DAILY
COMMUNITY

## 2022-11-21 DIAGNOSIS — E78.2 MIXED HYPERLIPIDEMIA: ICD-10-CM

## 2022-11-21 LAB
ALBUMIN SERPL-MCNC: 3.6 G/DL (ref 3.5–5.2)
ALP BLD-CCNC: 74 U/L (ref 40–129)
ALT SERPL-CCNC: 51 U/L (ref 0–40)
ANION GAP SERPL CALCULATED.3IONS-SCNC: 11 MMOL/L (ref 7–16)
AST SERPL-CCNC: 46 U/L (ref 0–39)
BILIRUB SERPL-MCNC: 0.7 MG/DL (ref 0–1.2)
BUN BLDV-MCNC: 21 MG/DL (ref 6–23)
CALCIUM SERPL-MCNC: 9.4 MG/DL (ref 8.6–10.2)
CHLORIDE BLD-SCNC: 105 MMOL/L (ref 98–107)
CHOLESTEROL, FASTING: 119 MG/DL (ref 0–199)
CO2: 26 MMOL/L (ref 22–29)
CREAT SERPL-MCNC: 0.8 MG/DL (ref 0.7–1.2)
GFR SERPL CREATININE-BSD FRML MDRD: >60 ML/MIN/1.73
GLUCOSE BLD-MCNC: 91 MG/DL (ref 74–99)
HDLC SERPL-MCNC: 27 MG/DL
LDL CHOLESTEROL CALCULATED: 66 MG/DL (ref 0–99)
POTASSIUM SERPL-SCNC: 5.1 MMOL/L (ref 3.5–5)
SODIUM BLD-SCNC: 142 MMOL/L (ref 132–146)
TOTAL PROTEIN: 6.3 G/DL (ref 6.4–8.3)
TRIGLYCERIDE, FASTING: 131 MG/DL (ref 0–149)
VLDLC SERPL CALC-MCNC: 26 MG/DL

## 2022-11-25 SDOH — HEALTH STABILITY: PHYSICAL HEALTH: ON AVERAGE, HOW MANY DAYS PER WEEK DO YOU ENGAGE IN MODERATE TO STRENUOUS EXERCISE (LIKE A BRISK WALK)?: 0 DAYS

## 2022-11-25 ASSESSMENT — PATIENT HEALTH QUESTIONNAIRE - PHQ9
6. FEELING BAD ABOUT YOURSELF - OR THAT YOU ARE A FAILURE OR HAVE LET YOURSELF OR YOUR FAMILY DOWN: 1
2. FEELING DOWN, DEPRESSED OR HOPELESS: 0
7. TROUBLE CONCENTRATING ON THINGS, SUCH AS READING THE NEWSPAPER OR WATCHING TELEVISION: 1
SUM OF ALL RESPONSES TO PHQ QUESTIONS 1-9: 11
SUM OF ALL RESPONSES TO PHQ QUESTIONS 1-9: 11
SUM OF ALL RESPONSES TO PHQ9 QUESTIONS 1 & 2: 3
10. IF YOU CHECKED OFF ANY PROBLEMS, HOW DIFFICULT HAVE THESE PROBLEMS MADE IT FOR YOU TO DO YOUR WORK, TAKE CARE OF THINGS AT HOME, OR GET ALONG WITH OTHER PEOPLE: 1
SUM OF ALL RESPONSES TO PHQ QUESTIONS 1-9: 11
SUM OF ALL RESPONSES TO PHQ QUESTIONS 1-9: 11
5. POOR APPETITE OR OVEREATING: 0
1. LITTLE INTEREST OR PLEASURE IN DOING THINGS: 3
8. MOVING OR SPEAKING SO SLOWLY THAT OTHER PEOPLE COULD HAVE NOTICED. OR THE OPPOSITE, BEING SO FIGETY OR RESTLESS THAT YOU HAVE BEEN MOVING AROUND A LOT MORE THAN USUAL: 0
4. FEELING TIRED OR HAVING LITTLE ENERGY: 3
9. THOUGHTS THAT YOU WOULD BE BETTER OFF DEAD, OR OF HURTING YOURSELF: 0
3. TROUBLE FALLING OR STAYING ASLEEP: 3

## 2022-11-25 ASSESSMENT — LIFESTYLE VARIABLES
HOW OFTEN DO YOU HAVE SIX OR MORE DRINKS ON ONE OCCASION: 1
HOW MANY STANDARD DRINKS CONTAINING ALCOHOL DO YOU HAVE ON A TYPICAL DAY: 0
HOW OFTEN DO YOU HAVE A DRINK CONTAINING ALCOHOL: NEVER
HOW OFTEN DO YOU HAVE A DRINK CONTAINING ALCOHOL: 1
HOW MANY STANDARD DRINKS CONTAINING ALCOHOL DO YOU HAVE ON A TYPICAL DAY: PATIENT DOES NOT DRINK

## 2022-11-28 ENCOUNTER — OFFICE VISIT (OUTPATIENT)
Dept: PRIMARY CARE CLINIC | Age: 87
End: 2022-11-28
Payer: MEDICARE

## 2022-11-28 VITALS
TEMPERATURE: 98.3 F | HEART RATE: 74 BPM | HEIGHT: 68 IN | DIASTOLIC BLOOD PRESSURE: 64 MMHG | SYSTOLIC BLOOD PRESSURE: 122 MMHG | OXYGEN SATURATION: 93 % | WEIGHT: 214 LBS | BODY MASS INDEX: 32.43 KG/M2

## 2022-11-28 DIAGNOSIS — I48.92 ATRIAL FIBRILLATION AND FLUTTER (HCC): ICD-10-CM

## 2022-11-28 DIAGNOSIS — I48.91 ATRIAL FIBRILLATION AND FLUTTER (HCC): ICD-10-CM

## 2022-11-28 DIAGNOSIS — Z00.00 MEDICARE ANNUAL WELLNESS VISIT, SUBSEQUENT: Primary | ICD-10-CM

## 2022-11-28 DIAGNOSIS — N40.1 BPH WITH OBSTRUCTION/LOWER URINARY TRACT SYMPTOMS: ICD-10-CM

## 2022-11-28 DIAGNOSIS — N13.8 BPH WITH OBSTRUCTION/LOWER URINARY TRACT SYMPTOMS: ICD-10-CM

## 2022-11-28 DIAGNOSIS — M15.9 GENERALIZED OSTEOARTHRITIS: ICD-10-CM

## 2022-11-28 DIAGNOSIS — Z13.31 POSITIVE DEPRESSION SCREENING: ICD-10-CM

## 2022-11-28 PROCEDURE — G0439 PPPS, SUBSEQ VISIT: HCPCS | Performed by: INTERNAL MEDICINE

## 2022-11-28 PROCEDURE — 1123F ACP DISCUSS/DSCN MKR DOCD: CPT | Performed by: INTERNAL MEDICINE

## 2022-11-28 RX ORDER — TRAMADOL HYDROCHLORIDE 50 MG/1
50 TABLET ORAL EVERY 8 HOURS PRN
Qty: 90 TABLET | Refills: 0 | Status: SHIPPED | OUTPATIENT
Start: 2022-11-28 | End: 2022-12-28

## 2022-11-28 RX ORDER — APIXABAN 5 MG/1
5 TABLET, FILM COATED ORAL 2 TIMES DAILY
Qty: 180 TABLET | Refills: 0 | Status: SHIPPED | OUTPATIENT
Start: 2022-11-28 | End: 2023-02-26

## 2022-11-28 RX ORDER — TRAMADOL HYDROCHLORIDE 50 MG/1
TABLET ORAL
COMMUNITY
Start: 2022-10-26 | End: 2022-11-28 | Stop reason: SDUPTHER

## 2022-11-28 RX ORDER — TAMSULOSIN HYDROCHLORIDE 0.4 MG/1
0.4 CAPSULE ORAL NIGHTLY
Qty: 90 CAPSULE | Refills: 1 | Status: SHIPPED | OUTPATIENT
Start: 2022-11-28

## 2022-11-28 NOTE — PROGRESS NOTES
Jacqueline Cisneros (:  1934) is a 80 y.o. male,Established patient, here for evaluation of the following chief complaint(s):  Medicare AWV      Subjective   SUBJECTIVE/OBJECTIVE:  HPI  Hyperlipidemia:  Patient is here to follow up regarding chronic hyperlipidemia. This is  generally controlled. Treatment includes simvastatin. Patient is  compliant with lifestyle modifications. Patient is not a smoker. Most recent labs reviewed with patient today and are not remarkable. Comorbid conditions include obesity. Hypertension:  Patient is here for follow up chronic hypertension. This is  generally controlled on current medication regimen. BP Readings from Last 1 Encounters:   22 122/64        Takes meds as directed and tolerates them well. Most recent labs reviewed with patient and are not remarkable. No symptoms from htn standpoint per ROS. Patient is  compliant with lifestyle modifications. Patient does not smoke.   Comorbid conditions include obesity  A Fib  Review of Systems    CBC with Differential:    Lab Results   Component Value Date/Time    WBC 9.9 2022 12:00 PM    RBC 4.73 2022 12:00 PM    HGB 15.4 2022 12:00 PM    HCT 47.1 2022 12:00 PM     2022 12:00 PM    MCV 99.6 2022 12:00 PM    MCH 32.6 2022 12:00 PM    MCHC 32.7 2022 12:00 PM    RDW 13.9 2022 12:00 PM     CMP:    Lab Results   Component Value Date/Time     2022 11:15 AM    K 5.1 2022 11:15 AM     2022 11:15 AM    CO2 26 2022 11:15 AM    BUN 21 2022 11:15 AM    CREATININE 0.8 2022 11:15 AM    GFRAA >60 2022 12:00 PM    LABGLOM >60 2022 11:15 AM    GLUCOSE 91 2022 11:15 AM    PROT 6.3 2022 11:15 AM    LABALBU 3.6 2022 11:15 AM    CALCIUM 9.4 2022 11:15 AM    BILITOT 0.7 2022 11:15 AM    ALKPHOS 74 2022 11:15 AM    AST 46 2022 11:15 AM    ALT 51 2022 11:15 AM     Lipid: Lab Results   Component Value Date    CHOL 100 08/09/2022     Lab Results   Component Value Date    TRIG 71 08/09/2022     Lab Results   Component Value Date    HDL 27 11/21/2022    HDL 37 08/09/2022     Lab Results   Component Value Date    LDLCALC 66 11/21/2022    LDLCALC 49 08/09/2022     Lab Results   Component Value Date    LABVLDL 26 11/21/2022    LABVLDL 14 08/09/2022     No results found for: CHOLHDLRATIO         Objective   /64   Pulse 74   Temp 98.3 °F (36.8 °C) (Temporal)   Ht 5' 8\" (1.727 m)   Wt 214 lb (97.1 kg)   SpO2 93%   BMI 32.54 kg/m²   Current Outpatient Medications   Medication Sig Dispense Refill    traMADol (ULTRAM) 50 MG tablet Take 1 tablet by mouth every 8 hours as needed for Pain for up to 30 days. 90 tablet 0    ELIQUIS 5 MG TABS tablet Take 1 tablet by mouth 2 times daily 180 tablet 0    tamsulosin (FLOMAX) 0.4 MG capsule Take 1 capsule by mouth at bedtime 90 capsule 1    metoprolol tartrate (LOPRESSOR) 25 MG tablet Take 0.5 tablets by mouth in the morning and at bedtime 90 tablet 1    EPINEPHrine (EPIPEN) 0.3 MG/0.3ML SOAJ injection Inject 0.3 mg into the muscle as needed Use as directed for allergic reaction      Calcium-Magnesium-Vitamin D (CALCIUM 1200+D3 PO) Take by mouth 2 times daily      vitamin E 400 UNIT capsule Take 400 Units by mouth daily      vitamin B-12 (CYANOCOBALAMIN) 250 MCG tablet Take 250 mcg by mouth daily      Biotin 07727 MCG TBDP Take by mouth daily      Garlic 8212 MG CAPS Take by mouth daily      Omega 3-6-9 Fatty Acids (TRIPLE OMEGA-3-6-9) CAPS Take by mouth daily      vitamin C (ASCORBIC ACID) 500 MG tablet Take 500 mg by mouth daily      omeprazole (PRILOSEC) 20 MG delayed release capsule Take 1 capsule by mouth daily (Patient not taking: Reported on 11/28/2022)      gabapentin (NEURONTIN) 300 MG capsule Take 1 capsule by mouth 3 times daily.  (Patient not taking: Reported on 11/28/2022)       No current facility-administered medications for this visit. Allergies   Allergen Reactions    Bee Venom Anaphylaxis        Past Medical History:   Diagnosis Date    Arthritis     GERD (gastroesophageal reflux disease)      Past Surgical History:   Procedure Laterality Date    CARPAL TUNNEL RELEASE Right 2017    CARPAL TUNNEL RELEASE Left 2017    COLECTOMY      for polyps    COLONOSCOPY      ELBOW SURGERY      tendon reattachment    EYE SURGERY Bilateral     cataracts    FOOT SURGERY Right 2018    fusion 1st mpj right foot    [place] OSTEOTOMY 1ST METATARSAL,BASE/SHAFT Right 2018    FUSION 1ST MPJ RIGHT FOOT performed by Moustapha May DPM at [address] [place] [address]       History reviewed. No pertinent family history.    Social History     Socioeconomic History    Marital status:      Spouse name: Not on file    Number of children: Not on file    Years of education: Not on file    Highest education level: Not on file   Occupational History    Not on file   Tobacco Use    Smoking status: Former     Types: Pipe     Quit date: 18     Years since quittin.9    Smokeless tobacco: Never   Substance and Sexual Activity    Alcohol use: Yes     Comment: occas    Drug use: No    Sexual activity: Not on file   Other Topics Concern    Not on file   Social History Narrative    Not on file     Social Determinants of Health     Financial Resource Strain: Low Risk     Difficulty of Paying Living Expenses: Not hard at all   Food Insecurity: No Food Insecurity    Worried About Running Out of Food in the Last Year: Never true    [address] in the Last Year: Never true   Transportation Needs: Not on file   Physical Activity: Unknown    Days of Exercise per Week: 0 days    Minutes of Exercise per Session: Not on file   Stress: Not on file   Social Connections: Not on file   Intimate Partner Violence: Not on file   Housing Stability: Not on file      Health Maintenance Due   Topic Date Due    DTaP/Tdap/Td vaccine (1 - Tdap) Never done    COVID-19 Vaccine (4 - Booster) 12/17/2021    Flu vaccine (1) 08/01/2022    lski    Physical Exam          ASSESSMENT/PLAN:  1. Medicare annual wellness visit, subsequent  2. Generalized osteoarthritis  -     traMADol (ULTRAM) 50 MG tablet; Take 1 tablet by mouth every 8 hours as needed for Pain for up to 30 days. , Disp-90 tablet, R-0Normal  3. Positive depression screening  4. Atrial fibrillation and flutter (HCC)  -     ELIQUIS 5 MG TABS tablet; Take 1 tablet by mouth 2 times daily, Disp-180 tablet, R-0, DAWNormal  -     metoprolol tartrate (LOPRESSOR) 25 MG tablet; Take 0.5 tablets by mouth in the morning and at bedtime, Disp-90 tablet, R-1Normal  -     Apoorva Hill MD, Electrophysiology, [facility]  5. BPH with obstruction/lower urinary tract symptoms  -     tamsulosin (FLOMAX) 0.4 MG capsule; Take 1 capsule by mouth at bedtime, Disp-90 capsule, R-1Normal  -     CANDID[facility] - Bobby Sanchez MD, Urology, [place]      Return in 3 months (on 2/28/2023) for Medicare Annual Wellness Visit in 1 year, follow up, lab. An electronic signature was used to authenticate this note.     --Marija Correia MD

## 2022-11-28 NOTE — PATIENT INSTRUCTIONS
Personalized Preventive Plan for Olga Hicks - 11/28/2022  Medicare offers a range of preventive health benefits. Some of the tests and screenings are paid in full while other may be subject to a deductible, co-insurance, and/or copay. Some of these benefits include a comprehensive review of your medical history including lifestyle, illnesses that may run in your family, and various assessments and screenings as appropriate. After reviewing your medical record and screening and assessments performed today your provider may have ordered immunizations, labs, imaging, and/or referrals for you. A list of these orders (if applicable) as well as your Preventive Care list are included within your After Visit Summary for your review. Other Preventive Recommendations:    A preventive eye exam performed by an eye specialist is recommended every 1-2 years to screen for glaucoma; cataracts, macular degeneration, and other eye disorders. A preventive dental visit is recommended every 6 months. Try to get at least 150 minutes of exercise per week or 10,000 steps per day on a pedometer . Order or download the FREE \"Exercise & Physical Activity: Your Everyday Guide\" from [employer]. Call 7-396.490.9467 or search [employer] online. You need 0848-1866 mg of calcium and 1718-1508 IU of vitamin D per day. It is possible to meet your calcium requirement with diet alone, but a vitamin D supplement is usually necessary to meet this goal.  When exposed to the sun, use a sunscreen that protects against both UVA and UVB radiation with an SPF of 30 or greater. Reapply every 2 to 3 hours or after sweating, drying off with a towel, or swimming. Always wear a seat belt when traveling in a car. Always wear a helmet when riding a bicycle or motorcycle.

## 2022-11-28 NOTE — PROGRESS NOTES
Medicare Annual Wellness Visit    Sukhjinder Flower is here for Medicare AWV    Assessment & Plan   Medicare annual wellness visit, subsequent  Generalized osteoarthritis  The following orders have not been finalized:  -     traMADol (ULTRAM) 50 MG tablet  Positive depression screening  Atrial fibrillation and flutter ([address]  The following orders have not been finalized:  -     metoprolol tartrate (LOPRESSOR) 25 MG tablet  -     ELIQUIS 5 MG TABS tablet  BPH with obstruction/lower urinary tract symptoms  The following orders have not been finalized:  -     tamsulosin (FLOMAX) 0.4 MG capsule    Recommendations for Preventive Services Due: see orders and patient instructions/AVS.  Recommended screening schedule for the next 5-10 years is provided to the patient in written form: see Patient Instructions/AVS.     Return in 3 months (on 2/28/2023) for Medicare Annual Wellness Visit in 1 year, follow up, lab. Subjective       Patient's complete Health Risk Assessment and screening values have been reviewed and are found in Flowsheets. The following problems were reviewed today and where indicated follow up appointments were made and/or referrals ordered. Positive Risk Factor Screenings with Interventions:    Fall Risk:  Do you feel unsteady or are you worried about falling? : (!) yes  2 or more falls in past year?: (!) yes  Fall with injury in past year?: (!) yes   Fall Risk Interventions:    Home exercises provided to promote strength and balance     Depression:  PHQ-2 Score: 3  PHQ-9 Total Score: 11    Severity:1-4 = minimal depression, 5-9 = mild depression, 10-14 = moderate depression, 15-19 = moderately severe depression, 20-27 = severe depression  Depression Interventions:  LPN INTERVENTION GUIDE: SCORE 5-14 = MODERATE DEPRESSION: FOLLOW UP IN 1 WEEK          Opioid Risk: (Low risk score <55) Opioid risk score: 5    Patient is low risk for opioid use disorder or overdose.   Last PDMP Lazaro Lockhart as Reviewed:  Review User Review Instant Review Result               General Health and ACP:  General  In general, how would you say your health is?: Good  In the past 7 days, have you experienced any of the following: New or Increased Pain, New or Increased Fatigue, Loneliness, Social Isolation, Stress or Anger?: No  Do you get the social and emotional support that you need?: Yes  Do you have a Living Will?: (!) No    Advance Directives       Power of  Living Will ACP-Advance Directive ACP-Power of     Not on File Not on File Not on File Not on File        General Health Risk Interventions:  No Living Will: Advance Care Planning addressed with patient today    Health Habits/Nutrition:  Physical Activity: Unknown    Days of Exercise per Week: 0 days    Minutes of Exercise per Session: Not on file     Have you lost any weight without trying in the past 3 months?: No  Body mass index: (!) 32.54  Have you seen the dentist within the past year?: (!) No  Health Habits/Nutrition Interventions:  Inadequate physical activity:  patient agrees to exercise for at least 150 minutes/week      ADLs:  In the past 7 days, did you need help from others to perform any of the following everyday activities: Eating, dressing, grooming, bathing, toileting, or walking/balance?: No  In the past 7 days, did you need help from others to take care of any of the following: Laundry, housekeeping, banking/finances, shopping, telephone use, food preparation, transportation, or taking medications?: (!) Yes  Select all that apply: [employer], Housekeeping, Banking/Finances, Shopping, Food Preparation, Transportation  ADL Interventions:  Patient declines any further evaluation/treatment for this issue          Objective   Vitals:    11/28/22 1245   BP: 122/64   Pulse: 74   Temp: 98.3 °F (36.8 °C)   TempSrc: Temporal   SpO2: 93%   Weight: 214 lb (97.1 kg)   Height: 5' 8\" (1.727 m)      Body mass index is 32.54 kg/m².              Allergies   Allergen Reactions Bee Venom Anaphylaxis     Prior to Visit Medications    Medication Sig Taking? Authorizing Provider   traMADol (ULTRAM) 50 MG tablet TAKE 1 TABLET BY MOUTH EVERY 8 HOURS AS NEEDED FOR PAIN FOR 30 DAYS REDUCE DOSES TAKEN AS PAIN BECOMES MANAGEABLE. Yes Historical Provider, MD   ELIQUIS 5 MG TABS tablet Take 1 tablet by mouth 2 times daily Yes Severiano Cabral MD   metoprolol tartrate (LOPRESSOR) 25 MG tablet Take 0.5 tablets by mouth in the morning and at bedtime Yes Historical Provider, MD   tamsulosin (FLOMAX) 0.4 MG capsule Take 1 capsule by mouth at bedtime Yes Historical Provider, MD   EPINEPHrine (EPIPEN) 0.3 MG/0.3ML SOAJ injection Inject 0.3 mg into the muscle as needed Use as directed for allergic reaction Yes Historical Provider, MD   Calcium-Magnesium-Vitamin D (CALCIUM 1200+D3 PO) Take by mouth 2 times daily Yes Historical Provider, MD   vitamin E 400 UNIT capsule Take 400 Units by mouth daily Yes Historical Provider, MD   vitamin B-12 (CYANOCOBALAMIN) 250 MCG tablet Take 250 mcg by mouth daily Yes Historical Provider, MD   Biotin 83296 MCG TBDP Take by mouth daily Yes Historical Provider, MD   Garlic 9394 MG CAPS Take by mouth daily Yes Historical Provider, MD   Omega 3-6-9 Fatty Acids (TRIPLE OMEGA-3-6-9) CAPS Take by mouth daily Yes Historical Provider, MD   vitamin C (ASCORBIC ACID) 500 MG tablet Take 500 mg by mouth daily Yes Historical Provider, MD   omeprazole (PRILOSEC) 20 MG delayed release capsule Take 1 capsule by mouth daily  Patient not taking: Reported on 11/28/2022  Historical Provider, MD   gabapentin (NEURONTIN) 300 MG capsule Take 1 capsule by mouth 3 times daily.   Patient not taking: Reported on 11/28/2022  Historical Provider, MD [place] (Including outside providers/suppliers regularly involved in providing care):   Patient Care Team:  Severiano Cabral MD as PCP - General (Internal Medicine)  Severiano Cabral MD as PCP - [address] [place] [address] Dr Bundy Provider     Reviewed and updated this visit:  Tobacco  Allergies  Meds  Problems  Med Hx  Surg Hx  Soc Hx  Fam Hx               PHQ-9 score today: (PHQ-9 Total Score: 11), additional evaluation and assessment performed, follow-up plan includes but not limited to: Medication management and Referral to /Specialist  for evaluation and management.

## 2022-11-29 ENCOUNTER — TELEPHONE (OUTPATIENT)
Dept: PRIMARY CARE CLINIC | Age: 87
End: 2022-11-29

## 2022-11-29 NOTE — TELEPHONE ENCOUNTER
Pt wife states you were going to send a cream for his arm, rash from radiation. Can you send to [facility]. Also wants an antidepressant per your discussion and physical therapy next door.  Please advise

## 2022-11-30 DIAGNOSIS — R26.81 GAIT INSTABILITY: ICD-10-CM

## 2022-11-30 DIAGNOSIS — F32.9 REACTIVE DEPRESSION: Primary | ICD-10-CM

## 2022-11-30 RX ORDER — ESCITALOPRAM OXALATE 10 MG/1
10 TABLET ORAL DAILY
Qty: 30 TABLET | Refills: 3 | Status: SHIPPED | OUTPATIENT
Start: 2022-11-30

## 2022-11-30 NOTE — TELEPHONE ENCOUNTER
For the rash it should be done by  dermatologist referal to pt is done and lexapro sent to patient pharmacy

## 2022-12-28 DIAGNOSIS — M15.9 GENERALIZED OSTEOARTHRITIS: ICD-10-CM

## 2022-12-28 RX ORDER — TRAMADOL HYDROCHLORIDE 50 MG/1
50 TABLET ORAL EVERY 8 HOURS PRN
Qty: 90 TABLET | Refills: 0 | Status: SHIPPED | OUTPATIENT
Start: 2022-12-28 | End: 2023-01-27

## 2023-01-19 ASSESSMENT — ENCOUNTER SYMPTOMS
SINUS PAIN: 0
FACIAL SWELLING: 0
GASTROINTESTINAL NEGATIVE: 1
RESPIRATORY NEGATIVE: 1
ALLERGIC/IMMUNOLOGIC NEGATIVE: 1
EYES NEGATIVE: 1
EYE REDNESS: 0
RHINORRHEA: 0
EYE PAIN: 0
EYE DISCHARGE: 0
EYE ITCHING: 0

## 2023-01-19 NOTE — PROGRESS NOTES
Deyvi Rose (:  1934) is a 88 y.o. male,Established patient, here for evaluation of the following chief complaint(s):  Follow-up      Subjective   SUBJECTIVE/OBJECTIVE:  HPI  Hyperlipidemia:  Patient is here to follow up regarding chronic hyperlipidemia.  This is  generally controlled.  Treatment includes simvastatin.  Patient is  compliant with lifestyle modifications.  Patient is not a smoker.  Most recent labs reviewed with patient today and are not remarkable.  Comorbid conditions include obesity.   Hypertension:  Patient is here for follow up chronic hypertension.  This is  generally controlled on current medication regimen.    BP Readings from Last 1 Encounters:   23 110/70        Takes meds as directed and tolerates them well.  Most recent labs reviewed with patient and are not remarkable.  No symptoms from htn standpoint per ROS.  Patient is  compliant with lifestyle modifications.  Patient does not smoke.  Comorbid conditions include obesity  Atrial fibrillation   Doing very well with medications no medication side effects no strokes or mini strokes and no bleed  Obstructive sleep apnea:  He is here for face-to-face meeting regarding obstructive sleep apnea and use of CPAP machine.  He is using about 4 to 6 hours per night.  He is feeling better.  He is not as sleepy as before.  He has no problems with that I had recommend that he continue using  Review of Systems   Constitutional: Negative.  Negative for activity change, appetite change, chills, fatigue and fever.   HENT: Negative.  Negative for congestion, ear pain, facial swelling, hearing loss, postnasal drip, rhinorrhea and sinus pain.    Eyes: Negative.  Negative for pain, discharge, redness and itching.   Respiratory: Negative.     Cardiovascular: Negative.    Gastrointestinal: Negative.    Endocrine: Negative.    Genitourinary: Negative.    Musculoskeletal: Negative.    Skin: Negative.    Allergic/Immunologic: Negative.   Neurological: Negative. Hematological: Negative. Psychiatric/Behavioral: Negative. CBC with Differential:    Lab Results   Component Value Date/Time    WBC 9.9 08/09/2022 12:00 PM    RBC 4.73 08/09/2022 12:00 PM    HGB 15.4 08/09/2022 12:00 PM    HCT 47.1 08/09/2022 12:00 PM     08/09/2022 12:00 PM    MCV 99.6 08/09/2022 12:00 PM    MCH 32.6 08/09/2022 12:00 PM    MCHC 32.7 08/09/2022 12:00 PM    RDW 13.9 08/09/2022 12:00 PM     CMP:    Lab Results   Component Value Date/Time     11/21/2022 11:15 AM    K 5.1 11/21/2022 11:15 AM     11/21/2022 11:15 AM    CO2 26 11/21/2022 11:15 AM    BUN 21 11/21/2022 11:15 AM    CREATININE 0.8 11/21/2022 11:15 AM    GFRAA >60 08/09/2022 12:00 PM    LABGLOM >60 11/21/2022 11:15 AM    GLUCOSE 91 11/21/2022 11:15 AM    PROT 6.3 11/21/2022 11:15 AM    LABALBU 3.6 11/21/2022 11:15 AM    CALCIUM 9.4 11/21/2022 11:15 AM    BILITOT 0.7 11/21/2022 11:15 AM    ALKPHOS 74 11/21/2022 11:15 AM    AST 46 11/21/2022 11:15 AM    ALT 51 11/21/2022 11:15 AM     HgBA1c:  No results found for: LABA1C  Lipid:   Lab Results   Component Value Date    CHOL 100 08/09/2022     Lab Results   Component Value Date    TRIG 71 08/09/2022     Lab Results   Component Value Date    HDL 27 11/21/2022    HDL 37 08/09/2022     Lab Results   Component Value Date    LDLCALC 66 11/21/2022    LDLCALC 49 08/09/2022     Lab Results   Component Value Date    LABVLDL 26 11/21/2022    LABVLDL 14 08/09/2022     No results found for: CHOLHDLRATIO         Objective   /70   Pulse 78   Temp 97.8 °F (36.6 °C) (Temporal)   Ht 5' 8\" (1.727 m)   Wt 211 lb (95.7 kg)   SpO2 100%   BMI 32.08 kg/m²   Current Outpatient Medications   Medication Sig Dispense Refill    traMADol (ULTRAM) 50 MG tablet Take 1 tablet by mouth every 8 hours as needed for Pain for up to 30 days.  90 tablet 0    escitalopram (LEXAPRO) 10 MG tablet Take 1 tablet by mouth daily 30 tablet 3    ELIQUIS 5 MG TABS tablet Take 1 tablet by mouth 2 times daily 180 tablet 0    tamsulosin (FLOMAX) 0.4 MG capsule Take 1 capsule by mouth at bedtime 90 capsule 1    metoprolol tartrate (LOPRESSOR) 25 MG tablet Take 0.5 tablets by mouth in the morning and at bedtime 90 tablet 1    EPINEPHrine (EPIPEN) 0.3 MG/0.3ML SOAJ injection Inject 0.3 mg into the muscle as needed Use as directed for allergic reaction      Calcium-Magnesium-Vitamin D (CALCIUM 1200+D3 PO) Take by mouth 2 times daily      vitamin E 400 UNIT capsule Take 400 Units by mouth daily      vitamin B-12 (CYANOCOBALAMIN) 250 MCG tablet Take 250 mcg by mouth daily      Biotin 08402 MCG TBDP Take by mouth daily      Garlic 7368 MG CAPS Take by mouth daily      Omega 3-6-9 Fatty Acids (TRIPLE OMEGA-3-6-9) CAPS Take by mouth daily      vitamin C (ASCORBIC ACID) 500 MG tablet Take 500 mg by mouth daily      mometasone (ELOCON) 0.1 % cream APPLY TO RADIATION THERAPY SKIN REACTION THREE TIMES A DAY      omeprazole (PRILOSEC) 20 MG delayed release capsule Take 1 capsule by mouth daily (Patient not taking: No sig reported)       No current facility-administered medications for this visit. Allergies   Allergen Reactions    Bee Venom Anaphylaxis        Past Medical History:   Diagnosis Date    Arthritis     GERD (gastroesophageal reflux disease)      Past Surgical History:   Procedure Laterality Date    CARPAL TUNNEL RELEASE Right 2017    CARPAL TUNNEL RELEASE Left 2017    COLECTOMY      for polyps    COLONOSCOPY      ELBOW SURGERY      tendon reattachment    EYE SURGERY Bilateral     cataracts    FOOT SURGERY Right 07/25/2018    fusion 1st mpj right foot    AK OSTEOT W/WO LNGTH SHRT/CORRJ 1ST METAR Right 7/25/2018    FUSION 1ST MPJ RIGHT FOOT performed by Iban Ramirez DPM at [address] [place] [address] [facility]       History reviewed. No pertinent family history.    Social History     Socioeconomic History    Marital status:      Spouse name: Not on file    Number of children: Not on file Years of education: Not on file    Highest education level: Not on file   Occupational History    Not on file   Tobacco Use    Smoking status: Former     Types: Pipe     Quit date: 18     Years since quittin.0    Smokeless tobacco: Never   Substance and Sexual Activity    Alcohol use: Yes     Comment: occas    Drug use: No    Sexual activity: Not on file   Other Topics Concern    Not on file   Social History Narrative    Not on file     Social Determinants of Health     Financial Resource Strain: Low Risk     Difficulty of Paying Living Expenses: Not hard at all   Food Insecurity: No Food Insecurity    Worried About Running Out of Food in the Last Year: Never true    Ran Out of Food in the Last Year: Never true   Transportation Needs: Not on file   Physical Activity: Unknown    Days of Exercise per Week: 0 days    Minutes of Exercise per Session: Not on file   Stress: Not on file   Social Connections: Not on file   Intimate Partner Violence: Not on file   Housing Stability: Not on file      Health Maintenance Due   Topic Date Due    DTaP/Tdap/Td vaccine (1 - Tdap) Never done    COVID-19 Vaccine (4 - Booster) 2021    Flu vaccine (1) 2022    ki    Physical Exam  Constitutional:       General: He is not in acute distress. Appearance: Normal appearance. He is normal weight. HENT:      Head: Normocephalic and atraumatic. Right Ear: Tympanic membrane, ear canal and external ear normal.      Left Ear: Tympanic membrane, ear canal and external ear normal. There is no impacted cerumen. Nose: Nose normal. No congestion or rhinorrhea. Mouth/Throat:      Mouth: Mucous membranes are moist.      Pharynx: Oropharynx is clear. No oropharyngeal exudate or posterior oropharyngeal erythema. Eyes:      General: No scleral icterus. Right eye: No discharge. Left eye: No discharge. Pupils: Pupils are equal, round, and reactive to light.    Neck:      Vascular: No carotid bruit. Cardiovascular:      Rate and Rhythm: Normal rate and regular rhythm. Pulses: Normal pulses. Heart sounds: No murmur heard. No gallop. Pulmonary:      Effort: Pulmonary effort is normal. No respiratory distress. Breath sounds: Normal breath sounds. No wheezing, rhonchi or rales. Chest:      Chest wall: No tenderness. Abdominal:      General: Bowel sounds are normal.      Palpations: Abdomen is soft. There is no mass. Tenderness: There is no abdominal tenderness. There is no guarding. Hernia: No hernia is present. Musculoskeletal:         General: No swelling, tenderness, deformity or signs of injury. Cervical back: Normal range of motion and neck supple. No rigidity or tenderness. Right lower leg: No edema. Left lower leg: No edema. Lymphadenopathy:      Cervical: No cervical adenopathy. Skin:     General: Skin is warm and dry. Capillary Refill: Capillary refill takes 2 to 3 seconds. Findings: No bruising, lesion or rash. Neurological:      General: No focal deficit present. Mental Status: He is alert and oriented to person, place, and time. Sensory: No sensory deficit. Motor: No weakness. Gait: Gait normal.   Psychiatric:         Mood and Affect: Mood normal.         Behavior: Behavior normal.         Thought Content: Thought content normal.         Judgment: Judgment normal.             ASSESSMENT/PLAN:  1. DAT (obstructive sleep apnea)  2. Generalized osteoarthritis  -     traMADol (ULTRAM) 50 MG tablet; Take 1 tablet by mouth every 8 hours as needed for Pain for up to 30 days. , Disp-90 tablet, R-0Normal  3. Atrial fibrillation and flutter ([address]  4. Mixed hyperlipidemia    No follow-ups on file. An electronic signature was used to authenticate this note.     --Melissa Lara MD

## 2023-01-20 ENCOUNTER — OFFICE VISIT (OUTPATIENT)
Dept: PRIMARY CARE CLINIC | Age: 88
End: 2023-01-20
Payer: MEDICARE

## 2023-01-20 VITALS
OXYGEN SATURATION: 100 % | TEMPERATURE: 97.8 F | HEART RATE: 78 BPM | WEIGHT: 211 LBS | DIASTOLIC BLOOD PRESSURE: 70 MMHG | BODY MASS INDEX: 31.98 KG/M2 | SYSTOLIC BLOOD PRESSURE: 110 MMHG | HEIGHT: 68 IN

## 2023-01-20 DIAGNOSIS — I48.92 ATRIAL FIBRILLATION AND FLUTTER (HCC): ICD-10-CM

## 2023-01-20 DIAGNOSIS — E78.2 MIXED HYPERLIPIDEMIA: ICD-10-CM

## 2023-01-20 DIAGNOSIS — M15.9 GENERALIZED OSTEOARTHRITIS: ICD-10-CM

## 2023-01-20 DIAGNOSIS — G47.33 OSA (OBSTRUCTIVE SLEEP APNEA): Primary | ICD-10-CM

## 2023-01-20 DIAGNOSIS — I48.91 ATRIAL FIBRILLATION AND FLUTTER (HCC): ICD-10-CM

## 2023-01-20 PROCEDURE — 1123F ACP DISCUSS/DSCN MKR DOCD: CPT | Performed by: INTERNAL MEDICINE

## 2023-01-20 PROCEDURE — 99213 OFFICE O/P EST LOW 20 MIN: CPT | Performed by: INTERNAL MEDICINE

## 2023-01-20 RX ORDER — TRAMADOL HYDROCHLORIDE 50 MG/1
50 TABLET ORAL EVERY 8 HOURS PRN
Qty: 90 TABLET | Refills: 0 | Status: SHIPPED | OUTPATIENT
Start: 2023-01-20 | End: 2023-02-19

## 2023-01-20 RX ORDER — MOMETASONE FUROATE 1 MG/G
CREAM TOPICAL
COMMUNITY
Start: 2022-11-30

## 2023-01-20 ASSESSMENT — PATIENT HEALTH QUESTIONNAIRE - PHQ9
SUM OF ALL RESPONSES TO PHQ QUESTIONS 1-9: 0
2. FEELING DOWN, DEPRESSED OR HOPELESS: 0
SUM OF ALL RESPONSES TO PHQ9 QUESTIONS 1 & 2: 0
1. LITTLE INTEREST OR PLEASURE IN DOING THINGS: 0
SUM OF ALL RESPONSES TO PHQ QUESTIONS 1-9: 0

## 2023-02-23 DIAGNOSIS — M15.9 GENERALIZED OSTEOARTHRITIS: Primary | ICD-10-CM

## 2023-02-24 RX ORDER — TRAMADOL HYDROCHLORIDE 50 MG/1
50 TABLET ORAL EVERY 8 HOURS PRN
Qty: 90 TABLET | Refills: 0 | Status: SHIPPED | OUTPATIENT
Start: 2023-02-24 | End: 2023-03-26

## 2023-03-06 ENCOUNTER — OFFICE VISIT (OUTPATIENT)
Dept: NON INVASIVE DIAGNOSTICS | Age: 88
End: 2023-03-06
Payer: MEDICARE

## 2023-03-06 VITALS
SYSTOLIC BLOOD PRESSURE: 116 MMHG | WEIGHT: 211 LBS | DIASTOLIC BLOOD PRESSURE: 74 MMHG | BODY MASS INDEX: 31.98 KG/M2 | HEIGHT: 68 IN | RESPIRATION RATE: 16 BRPM | HEART RATE: 82 BPM

## 2023-03-06 DIAGNOSIS — I48.91 ATRIAL FIBRILLATION AND FLUTTER (HCC): Primary | ICD-10-CM

## 2023-03-06 DIAGNOSIS — I48.92 ATRIAL FIBRILLATION AND FLUTTER (HCC): Primary | ICD-10-CM

## 2023-03-06 DIAGNOSIS — I48.0 PAROXYSMAL ATRIAL FIBRILLATION (HCC): ICD-10-CM

## 2023-03-06 PROCEDURE — 1123F ACP DISCUSS/DSCN MKR DOCD: CPT | Performed by: INTERNAL MEDICINE

## 2023-03-06 PROCEDURE — 93000 ELECTROCARDIOGRAM COMPLETE: CPT | Performed by: INTERNAL MEDICINE

## 2023-03-06 PROCEDURE — 99205 OFFICE O/P NEW HI 60 MIN: CPT | Performed by: INTERNAL MEDICINE

## 2023-03-06 RX ORDER — AMIODARONE HYDROCHLORIDE 200 MG/1
200 TABLET ORAL DAILY
Qty: 90 TABLET | Refills: 1 | Status: SHIPPED | OUTPATIENT
Start: 2023-03-06

## 2023-03-06 NOTE — PROGRESS NOTES
[address] and [address] Electrophysiology  Consultation Report  PATIENT: [id number] RECORD NUMBER: [id number]  DATE OF SERVICE:  3/6/2023  ATTENDING ELECTROPHYSIOLOGIST: Cheyenne Pan MD  REFERRING PHYSICIAN: Avis Schwab, MD and Pastor Torie MD  CHIEF COMPLAINT:   Chief Complaint   Patient presents with    Atrial Fibrillation     NP per [facility] DX AFIB and flutter         HPI: This is a 80 y.o. 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 The patient has seen Dr. Alva Kaufman in the past and has undergone a cardioversion  in 2020. He was last seen by Dr. Alva Kaufman in the hospital in 2020 at which time the patient was in atrial fibrillation An echocardiogram was completed which revealed normal LV function and subsequently he was cardioverted in December 2020. Atrial fibrillation recurred but no further interventions were undertaken thereafter. Patient admits to marked reduction in physical capacity and inability to perform tasks in his yard. He was therefore referred by Dr. Fletcher Keane to me for persistent atrial fibrillation. No complaints of chest pain or shortness of breath at rest but he gets markedly dyspneic with physical activity. No syncope or near syncope.   No symptoms of paroxysmal nocturnal dyspnea, orthopnea or leg edema    Patient Active Problem List    Diagnosis Date Noted    DAT (obstructive sleep apnea) 01/20/2023     Priority: Medium    Generalized osteoarthritis 01/20/2023     Priority: Medium    Atrial fibrillation and flutter ([address] 01/20/2023     Priority: Medium    Mixed hyperlipidemia 01/20/2023     Priority: Medium    Sleep disorder, unspecified 08/16/2022     Priority: Medium    Gouty arthritis of toe of right foot 07/25/2018     Priority:

## 2023-03-07 ENCOUNTER — TELEPHONE (OUTPATIENT)
Dept: NON INVASIVE DIAGNOSTICS | Age: 88
End: 2023-03-07

## 2023-03-07 NOTE — TELEPHONE ENCOUNTER
Patient scheduled 3/28/2023 @ 9:30      I spoke to [employer] and reminded them of their upcoming EP procedure with Dr. Brady Santacruz. They know to arrive at [facility] ([facility] at 8:30 on 3/28/2023. I instructed them to remain NPO after midnight. [employer] verbalized understanding.

## 2023-03-22 ASSESSMENT — ENCOUNTER SYMPTOMS
EYE REDNESS: 0
ALLERGIC/IMMUNOLOGIC NEGATIVE: 1
RHINORRHEA: 0
EYE ITCHING: 0
GASTROINTESTINAL NEGATIVE: 1
RESPIRATORY NEGATIVE: 1
SINUS PAIN: 0
FACIAL SWELLING: 0
EYE PAIN: 0
EYE DISCHARGE: 0
EYES NEGATIVE: 1

## 2023-03-23 ENCOUNTER — HOSPITAL ENCOUNTER (OUTPATIENT)
Dept: CARDIOLOGY | Age: 88
Discharge: HOME | End: 2023-03-23
Payer: MEDICARE

## 2023-03-23 DIAGNOSIS — I48.0 PAROXYSMAL ATRIAL FIBRILLATION (HCC): ICD-10-CM

## 2023-03-23 DIAGNOSIS — I48.92 ATRIAL FIBRILLATION AND FLUTTER (HCC): ICD-10-CM

## 2023-03-23 DIAGNOSIS — I48.91 ATRIAL FIBRILLATION AND FLUTTER (HCC): ICD-10-CM

## 2023-03-23 LAB
LV EF: 70 %
LVEF MODALITY: NORMAL

## 2023-03-23 PROCEDURE — 93306 TTE W/DOPPLER COMPLETE: CPT

## 2023-03-28 ENCOUNTER — HOSPITAL ENCOUNTER (OUTPATIENT)
Dept: CARDIAC CATH/INVASIVE PROCEDURES | Age: 88
Discharge: HOME | End: 2023-03-28
Payer: MEDICARE

## 2023-03-28 ENCOUNTER — ANESTHESIA (OUTPATIENT)
Dept: CARDIAC CATH/INVASIVE PROCEDURES | Age: 88
End: 2023-03-28

## 2023-03-28 ENCOUNTER — ANESTHESIA EVENT (OUTPATIENT)
Dept: CARDIAC CATH/INVASIVE PROCEDURES | Age: 88
End: 2023-03-28

## 2023-03-28 VITALS
HEART RATE: 63 BPM | DIASTOLIC BLOOD PRESSURE: 63 MMHG | WEIGHT: 210 LBS | TEMPERATURE: 98.6 F | SYSTOLIC BLOOD PRESSURE: 111 MMHG | RESPIRATION RATE: 16 BRPM | BODY MASS INDEX: 31.93 KG/M2 | OXYGEN SATURATION: 99 %

## 2023-03-28 PROBLEM — I48.19 PERSISTENT ATRIAL FIBRILLATION (HCC): Status: ACTIVE | Noted: 2023-03-28

## 2023-03-28 LAB
ANION GAP SERPL CALCULATED.3IONS-SCNC: 9 MMOL/L (ref 7–16)
BASOPHILS # BLD: 0.03 E9/L (ref 0–0.2)
BASOPHILS NFR BLD: 0.2 % (ref 0–2)
BUN SERPL-MCNC: 30 MG/DL (ref 6–23)
CALCIUM SERPL-MCNC: 9.6 MG/DL (ref 8.6–10.2)
CHLORIDE SERPL-SCNC: 107 MMOL/L (ref 98–107)
CO2 SERPL-SCNC: 27 MMOL/L (ref 22–29)
CREAT SERPL-MCNC: 0.9 MG/DL (ref 0.7–1.2)
EOSINOPHIL # BLD: 0.04 E9/L (ref 0.05–0.5)
EOSINOPHIL NFR BLD: 0.3 % (ref 0–6)
ERYTHROCYTE [DISTWIDTH] IN BLOOD BY AUTOMATED COUNT: 13.9 FL (ref 11.5–15)
GLUCOSE SERPL-MCNC: 104 MG/DL (ref 74–99)
HCT VFR BLD AUTO: 52.4 % (ref 37–54)
HGB BLD-MCNC: 16.6 G/DL (ref 12.5–16.5)
IMM GRANULOCYTES # BLD: 0.1 E9/L
IMM GRANULOCYTES NFR BLD: 0.7 % (ref 0–5)
LYMPHOCYTES # BLD: 0.94 E9/L (ref 1.5–4)
LYMPHOCYTES NFR BLD: 6.9 % (ref 20–42)
MAGNESIUM SERPL-MCNC: 2.2 MG/DL (ref 1.6–2.6)
MCH RBC QN AUTO: 31.7 PG (ref 26–35)
MCHC RBC AUTO-ENTMCNC: 31.7 % (ref 32–34.5)
MCV RBC AUTO: 100.2 FL (ref 80–99.9)
MONOCYTES # BLD: 1.61 E9/L (ref 0.1–0.95)
MONOCYTES NFR BLD: 11.8 % (ref 2–12)
NEUTROPHILS # BLD: 10.93 E9/L (ref 1.8–7.3)
NEUTS SEG NFR BLD: 80.1 % (ref 43–80)
OVALOCYTES: ABNORMAL
PLATELET # BLD AUTO: 157 E9/L (ref 130–450)
PMV BLD AUTO: 11.5 FL (ref 7–12)
POIKILOCYTES: ABNORMAL
POTASSIUM SERPL-SCNC: 4.4 MMOL/L (ref 3.5–5)
RBC # BLD AUTO: 5.23 E12/L (ref 3.8–5.8)
SODIUM SERPL-SCNC: 143 MMOL/L (ref 132–146)
WBC # BLD: 13.7 E9/L (ref 4.5–11.5)

## 2023-03-28 PROCEDURE — 3700000000 HC ANESTHESIA ATTENDED CARE

## 2023-03-28 PROCEDURE — 83735 ASSAY OF MAGNESIUM: CPT

## 2023-03-28 PROCEDURE — 92960 CARDIOVERSION ELECTRIC EXT: CPT | Performed by: INTERNAL MEDICINE

## 2023-03-28 PROCEDURE — 80048 BASIC METABOLIC PNL TOTAL CA: CPT

## 2023-03-28 PROCEDURE — 85025 COMPLETE CBC W/AUTO DIFF WBC: CPT

## 2023-03-28 PROCEDURE — 2580000003 HC RX 258: Performed by: NURSE ANESTHETIST, CERTIFIED REGISTERED

## 2023-03-28 PROCEDURE — 6360000002 HC RX W HCPCS: Performed by: NURSE ANESTHETIST, CERTIFIED REGISTERED

## 2023-03-28 PROCEDURE — 3700000001 HC ADD 15 MINUTES (ANESTHESIA)

## 2023-03-28 PROCEDURE — 2709999900 HC NON-CHARGEABLE SUPPLY

## 2023-03-28 PROCEDURE — 92960 CARDIOVERSION ELECTRIC EXT: CPT

## 2023-03-28 PROCEDURE — 36415 COLL VENOUS BLD VENIPUNCTURE: CPT

## 2023-03-28 RX ORDER — SODIUM CHLORIDE 9 MG/ML
INJECTION, SOLUTION INTRAVENOUS CONTINUOUS PRN
Status: DISCONTINUED | OUTPATIENT
Start: 2023-03-28 | End: 2023-03-28 | Stop reason: SDUPTHER

## 2023-03-28 RX ORDER — PROPOFOL 10 MG/ML
INJECTION, EMULSION INTRAVENOUS PRN
Status: DISCONTINUED | OUTPATIENT
Start: 2023-03-28 | End: 2023-03-28 | Stop reason: SDUPTHER

## 2023-03-28 RX ADMIN — SODIUM CHLORIDE: 9 INJECTION, SOLUTION INTRAVENOUS at 10:30

## 2023-03-28 RX ADMIN — PROPOFOL 50 MG: 10 INJECTION, EMULSION INTRAVENOUS at 10:38

## 2023-03-28 NOTE — OP NOTE
[facility]) Physicians- The Heart and [address] Electrophysiology  Procedure Report  PATIENT: [facility]  MEDICAL RECORD NUMBER: [id number]  DATE OF PROCEDURE:  3/28/2023  ATTENDING ELECTROPHYSIOLOGIST:  Rishi Vela MD  REFERRING PHYSICIAN: Dr. Kamran Smiley:    1. Direct Current Electrical Cardioversion    INDICATION:  Persistent atrial fibrillation    PROCEDURE PERFORMED By: Rishi Vela MD    PROCEDURE TIME: 11 AM    COMPICATIONS: None immediately apparent    ANESTHESIA: LMAC    DESCRIPTION OF PROCEDURE: The risks, benefits, and alternatives to the procedure were discussed with the patient, and informed consent was obtained. The patient was brought to the cardiovascular lab and sedated under the guidance of anesthesia. Once anesthesia was deemed adequate, a 300 J biphasic synchronous shock was applied. The patient was then allowed to wake in the usual fashion. Comment: The patient was therapeutically anticoagulated for a minimum of 3 consecutive weeks prior to cardioversion. SUMMARY:  1. Successful cardioversion of persistent atrial fibrillation to sinus rhythm. RECOMMENDATIONS:  1. Discharge to home when fully awake and alert, if clinically stable. 2. Resume all pre-procedure medications, including anticoagulation. 3. Follow-up in the office in 1 month.     Rishi Vela MD  Cardiac Electrophysiology  [facility]) Physicians  The Heart and [facility] [place]: [facility] Electrophysiology  11:06 AM  3/28/2023

## 2023-03-28 NOTE — ANESTHESIA PRE PROCEDURE
results found for: PHART, PO2ART, QWB7MAR, IIQ3RYR, BEART, E4FFZDMY     Type & Screen (If Applicable):  No results found for: LABABO, LABRH    Drug/Infectious Status (If Applicable):  No results found for: HIV, HEPCAB    COVID-19 Screening (If Applicable): No results found for: COVID19        Anesthesia Evaluation  Patient summary reviewed and Nursing notes reviewed no history of anesthetic complications:   Airway: Mallampati: II  TM distance: >3 FB   Neck ROM: full  Mouth opening: > = 3 FB   Dental:    (+) upper dentures and lower dentures      Pulmonary: breath sounds clear to auscultation  (+) sleep apnea:                             Cardiovascular:    (+) dysrhythmias: atrial fibrillation, hyperlipidemia      ECG reviewed  Rhythm: irregular  Rate: normal  Echocardiogram reviewed                  Neuro/Psych:               GI/Hepatic/Renal:   (+) GERD:,           Endo/Other:    (+) blood dyscrasia: anticoagulation therapy:., .                 Abdominal:             Vascular: Other Findings:           Anesthesia Plan      MAC     ASA 4       Induction: intravenous. Anesthetic plan and risks discussed with patient. Plan discussed with attending.                     JANUSZ Santizo - CRNA   3/28/2023

## 2023-03-28 NOTE — ANESTHESIA POSTPROCEDURE EVALUATION
Department of Anesthesiology  Postprocedure Note    Patient: Ruben Tafoya  MRN: [id number]  YOB: 1934  Date of evaluation: 3/28/2023      Procedure Summary     Date: 03/28/23 [place] [facility]    Anesthesia Start: 1030 Anesthesia Stop: 1251    Procedure: CARDIOVERSION WITH ANESTHESIA Diagnosis:       Essential (primary) hypertension      Unspecified atrial fibrillation      Paroxysmal atrial fibrillation    Scheduled Providers:  Responsible Provider: Lauren Valentin MD    Anesthesia Type: MAC ASA Status: 4          Anesthesia Type: No value filed.     Marcell Phase I:      Marcell Phase II:        Anesthesia Post Evaluation    Patient location during evaluation: PACU  Patient participation: complete - patient participated  Level of consciousness: awake and alert  Airway patency: patent  Nausea & Vomiting: no nausea and no vomiting  Complications: no  Cardiovascular status: blood pressure returned to baseline and hemodynamically stable  Respiratory status: acceptable and spontaneous ventilation  Hydration status: euvolemic  Multimodal analgesia pain management approach

## 2023-03-28 NOTE — DISCHARGE INSTRUCTIONS
Discharge Date:  3/28/2023   Discharged To: home with support    Resume Activity:  Bathing:   Tub Yes   Shower Yes  Walking:Yes  Stairs: Yes  Driving: Yes,tomorrow  Work: Yes  School: NA  Sexual Activity: Yes  Lifting: No    Special Instructions: Call for any questions  EKG in 2 weeks    Diet: No added salt. Tobacco Cessation Counseling: Done. Office Follow Up: Keep appointment as scheduled.     Office Number [phone]

## 2023-03-29 ENCOUNTER — TELEPHONE (OUTPATIENT)
Dept: NON INVASIVE DIAGNOSTICS | Age: 88
End: 2023-03-29

## 2023-03-29 DIAGNOSIS — M15.9 GENERALIZED OSTEOARTHRITIS: ICD-10-CM

## 2023-03-29 DIAGNOSIS — I48.92 ATRIAL FIBRILLATION AND FLUTTER (HCC): ICD-10-CM

## 2023-03-29 DIAGNOSIS — I48.91 ATRIAL FIBRILLATION AND FLUTTER (HCC): ICD-10-CM

## 2023-03-29 RX ORDER — TRAMADOL HYDROCHLORIDE 50 MG/1
TABLET ORAL
Qty: 90 TABLET | Refills: 0 | Status: SHIPPED | OUTPATIENT
Start: 2023-03-29 | End: 2023-06-27

## 2023-03-29 RX ORDER — APIXABAN 5 MG/1
TABLET, FILM COATED ORAL
Qty: 180 TABLET | Refills: 0 | Status: SHIPPED | OUTPATIENT
Start: 2023-03-29

## 2023-03-29 NOTE — TELEPHONE ENCOUNTER
----- Message from Chapo Luna MD sent at 3/28/2023 11:05 AM EDT -----  Patient needs an EKG in 2 weeks    MR

## 2023-03-30 DIAGNOSIS — M15.9 GENERALIZED OSTEOARTHRITIS: ICD-10-CM

## 2023-03-30 DIAGNOSIS — I48.91 ATRIAL FIBRILLATION AND FLUTTER (HCC): ICD-10-CM

## 2023-03-30 DIAGNOSIS — I48.92 ATRIAL FIBRILLATION AND FLUTTER (HCC): ICD-10-CM

## 2023-03-30 RX ORDER — TRAMADOL HYDROCHLORIDE 50 MG/1
50 TABLET ORAL EVERY 8 HOURS PRN
Qty: 90 TABLET | Refills: 0 | OUTPATIENT
Start: 2023-03-30 | End: 2023-06-28

## 2023-03-30 RX ORDER — APIXABAN 5 MG/1
5 TABLET, FILM COATED ORAL 2 TIMES DAILY
Qty: 180 TABLET | Refills: 0 | OUTPATIENT
Start: 2023-03-30

## 2023-04-14 ENCOUNTER — TELEPHONE (OUTPATIENT)
Dept: CARDIOLOGY CLINIC | Age: 88
End: 2023-04-14

## 2023-04-17 ENCOUNTER — TELEPHONE (OUTPATIENT)
Dept: NON INVASIVE DIAGNOSTICS | Age: 88
End: 2023-04-17

## 2023-04-17 DIAGNOSIS — E78.2 MIXED HYPERLIPIDEMIA: Primary | ICD-10-CM

## 2023-04-17 NOTE — TELEPHONE ENCOUNTER
----- Message -----   From: Mitul Hodge MD   Sent: 4/14/2023   5:09 PM EDT   To: Dario Black RN     Patient needs admitted for dofetilide therapy.   I spoke to his wife today and they wish to be hospitalized on May 8 when I am on-call in the  Hospital again     MR

## 2023-04-17 NOTE — TELEPHONE ENCOUNTER
----- Message from May Lizarraga RN sent at 4/17/2023  1:43 PM EDT -----  Regarding: RE: 3 day admit  Call me when you have a moment. [phone] thanks  ----- Message -----  From: Maxwell Malin  Sent: 4/17/2023   1:37 PM EDT  To: May Lizarraga RN  Subject: RE: 3 day admit                                  Did you talk to the patient already? This just needs put on the schedule correct?   ----- Message -----  From: May Lizarraga RN  Sent: 4/17/2023   7:48 AM EDT  To: Maxwell Malin  Subject: 3 day admit                                      Could you please schedule this for me? Thank you!!  ----- Message -----  From: Lara Noyola MD  Sent: 4/14/2023   5:09 PM EDT  To: May Lizarraga RN    Patient needs admitted for dofetilide therapy.   I spoke to his wife today and they wish to be hospitalized on May 8 when I am on-call in the  Hospital again    MR

## 2023-04-17 NOTE — TELEPHONE ENCOUNTER
Called and spoke to admitting gave them the information for the direct admit on 05/08/2023. Fito MARTINEZ will call the patient and give information.

## 2023-04-17 NOTE — TELEPHONE ENCOUNTER
Admitting notified of above. Spoke to patient's wife and informed her the hospital would be calling her on May 8th with a bed number and time to arrive at [address] [place], [place] [address]NN understanding. Informed her to call with any further questions or concerns.

## 2023-04-18 SDOH — ECONOMIC STABILITY: INCOME INSECURITY: HOW HARD IS IT FOR YOU TO PAY FOR THE VERY BASICS LIKE FOOD, HOUSING, MEDICAL CARE, AND HEATING?: NOT HARD AT ALL

## 2023-04-18 SDOH — ECONOMIC STABILITY: FOOD INSECURITY: WITHIN THE PAST 12 MONTHS, YOU WORRIED THAT YOUR FOOD WOULD RUN OUT BEFORE YOU GOT MONEY TO BUY MORE.: NEVER TRUE

## 2023-04-18 SDOH — ECONOMIC STABILITY: FOOD INSECURITY: WITHIN THE PAST 12 MONTHS, THE FOOD YOU BOUGHT JUST DIDN'T LAST AND YOU DIDN'T HAVE MONEY TO GET MORE.: NEVER TRUE

## 2023-04-18 SDOH — ECONOMIC STABILITY: HOUSING INSECURITY
IN THE LAST 12 MONTHS, WAS THERE A TIME WHEN YOU DID NOT HAVE A STEADY PLACE TO SLEEP OR SLEPT IN A SHELTER (INCLUDING NOW)?: NO

## 2023-04-18 SDOH — ECONOMIC STABILITY: TRANSPORTATION INSECURITY
IN THE PAST 12 MONTHS, HAS LACK OF TRANSPORTATION KEPT YOU FROM MEETINGS, WORK, OR FROM GETTING THINGS NEEDED FOR DAILY LIVING?: NO

## 2023-04-21 ENCOUNTER — TELEPHONE (OUTPATIENT)
Dept: NON INVASIVE DIAGNOSTICS | Age: 88
End: 2023-04-21

## 2023-04-21 ENCOUNTER — OFFICE VISIT (OUTPATIENT)
Dept: PRIMARY CARE CLINIC | Age: 88
End: 2023-04-21

## 2023-04-21 VITALS
OXYGEN SATURATION: 98 % | TEMPERATURE: 98.6 F | SYSTOLIC BLOOD PRESSURE: 110 MMHG | BODY MASS INDEX: 31.69 KG/M2 | DIASTOLIC BLOOD PRESSURE: 68 MMHG | HEIGHT: 68 IN | WEIGHT: 209.1 LBS | HEART RATE: 86 BPM

## 2023-04-21 DIAGNOSIS — I48.91 ATRIAL FIBRILLATION AND FLUTTER (HCC): ICD-10-CM

## 2023-04-21 DIAGNOSIS — F11.20 OPIOID DEPENDENCE WITH CURRENT USE (HCC): ICD-10-CM

## 2023-04-21 DIAGNOSIS — I48.19 PERSISTENT ATRIAL FIBRILLATION (HCC): ICD-10-CM

## 2023-04-21 DIAGNOSIS — R73.02 GLUCOSE INTOLERANCE (IMPAIRED GLUCOSE TOLERANCE): ICD-10-CM

## 2023-04-21 DIAGNOSIS — D50.9 IRON DEFICIENCY ANEMIA, UNSPECIFIED IRON DEFICIENCY ANEMIA TYPE: Primary | ICD-10-CM

## 2023-04-21 DIAGNOSIS — M15.9 GENERALIZED OSTEOARTHRITIS: ICD-10-CM

## 2023-04-21 DIAGNOSIS — G47.33 OSA (OBSTRUCTIVE SLEEP APNEA): ICD-10-CM

## 2023-04-21 DIAGNOSIS — R26.81 GAIT INSTABILITY: ICD-10-CM

## 2023-04-21 DIAGNOSIS — E78.2 MIXED HYPERLIPIDEMIA: Primary | ICD-10-CM

## 2023-04-21 DIAGNOSIS — R55 SYNCOPE, UNSPECIFIED SYNCOPE TYPE: ICD-10-CM

## 2023-04-21 DIAGNOSIS — I48.92 ATRIAL FIBRILLATION AND FLUTTER (HCC): ICD-10-CM

## 2023-04-21 DIAGNOSIS — D68.69 SECONDARY HYPERCOAGULABLE STATE (HCC): ICD-10-CM

## 2023-04-21 RX ORDER — TRAMADOL HYDROCHLORIDE 50 MG/1
50 TABLET ORAL EVERY 8 HOURS PRN
Qty: 90 TABLET | Refills: 2 | Status: SHIPPED | OUTPATIENT
Start: 2023-04-21 | End: 2023-07-20

## 2023-04-21 NOTE — TELEPHONE ENCOUNTER
Please cancel Tikosyn admit per Dr Kathrin Castro. Admitting notified of cancellation of Tikosyn admit.

## 2023-04-24 ENCOUNTER — ANESTHESIA EVENT (OUTPATIENT)
Dept: CARDIAC CATH/INVASIVE PROCEDURES | Age: 88
End: 2023-04-24

## 2023-04-24 ENCOUNTER — ANESTHESIA (OUTPATIENT)
Dept: CARDIAC CATH/INVASIVE PROCEDURES | Age: 88
End: 2023-04-24

## 2023-04-24 ENCOUNTER — HOSPITAL ENCOUNTER (OUTPATIENT)
Dept: CARDIAC CATH/INVASIVE PROCEDURES | Age: 88
Discharge: HOME | End: 2023-04-24
Payer: MEDICARE

## 2023-04-24 VITALS
DIASTOLIC BLOOD PRESSURE: 82 MMHG | HEIGHT: 67 IN | OXYGEN SATURATION: 97 % | SYSTOLIC BLOOD PRESSURE: 120 MMHG | BODY MASS INDEX: 32.49 KG/M2 | WEIGHT: 207 LBS | RESPIRATION RATE: 16 BRPM | HEART RATE: 62 BPM | TEMPERATURE: 98.5 F

## 2023-04-24 PROBLEM — I48.0 PAROXYSMAL ATRIAL FIBRILLATION (HCC): Status: ACTIVE | Noted: 2023-04-24

## 2023-04-24 LAB
ANION GAP SERPL CALCULATED.3IONS-SCNC: 9 MMOL/L (ref 7–16)
BASOPHILS # BLD: 0.02 E9/L (ref 0–0.2)
BASOPHILS # BLD: 0.03 E9/L (ref 0–0.2)
BASOPHILS NFR BLD: 0.2 % (ref 0–2)
BASOPHILS NFR BLD: 0.4 % (ref 0–2)
BUN SERPL-MCNC: 18 MG/DL (ref 6–23)
CALCIUM SERPL-MCNC: 8.7 MG/DL (ref 8.6–10.2)
CHLORIDE SERPL-SCNC: 108 MMOL/L (ref 98–107)
CO2 SERPL-SCNC: 25 MMOL/L (ref 22–29)
CREAT SERPL-MCNC: 0.9 MG/DL (ref 0.7–1.2)
EOSINOPHIL # BLD: 0.04 E9/L (ref 0.05–0.5)
EOSINOPHIL # BLD: 0.06 E9/L (ref 0.05–0.5)
EOSINOPHIL NFR BLD: 0.5 % (ref 0–6)
EOSINOPHIL NFR BLD: 0.9 % (ref 0–6)
ERYTHROCYTE [DISTWIDTH] IN BLOOD BY AUTOMATED COUNT: 14.7 FL (ref 11.5–15)
ERYTHROCYTE [DISTWIDTH] IN BLOOD BY AUTOMATED COUNT: 14.9 FL (ref 11.5–15)
GLUCOSE SERPL-MCNC: 97 MG/DL (ref 74–99)
HCT VFR BLD AUTO: 39.6 % (ref 37–54)
HCT VFR BLD AUTO: 44.4 % (ref 37–54)
HGB BLD-MCNC: 12.9 G/DL (ref 12.5–16.5)
HGB BLD-MCNC: 14.4 G/DL (ref 12.5–16.5)
IMM GRANULOCYTES # BLD: 0.05 E9/L
IMM GRANULOCYTES # BLD: 0.07 E9/L
IMM GRANULOCYTES NFR BLD: 0.6 % (ref 0–5)
IMM GRANULOCYTES NFR BLD: 1 % (ref 0–5)
LYMPHOCYTES # BLD: 0.66 E9/L (ref 1.5–4)
LYMPHOCYTES # BLD: 0.83 E9/L (ref 1.5–4)
LYMPHOCYTES NFR BLD: 10.2 % (ref 20–42)
LYMPHOCYTES NFR BLD: 9.4 % (ref 20–42)
MAGNESIUM SERPL-MCNC: 2 MG/DL (ref 1.6–2.6)
MAGNESIUM SERPL-MCNC: 2.2 MG/DL (ref 1.6–2.6)
MCH RBC QN AUTO: 32.8 PG (ref 26–35)
MCH RBC QN AUTO: 32.9 PG (ref 26–35)
MCHC RBC AUTO-ENTMCNC: 32.4 % (ref 32–34.5)
MCHC RBC AUTO-ENTMCNC: 32.6 % (ref 32–34.5)
MCV RBC AUTO: 100.8 FL (ref 80–99.9)
MCV RBC AUTO: 101.4 FL (ref 80–99.9)
MONOCYTES # BLD: 1.1 E9/L (ref 0.1–0.95)
MONOCYTES # BLD: 1.12 E9/L (ref 0.1–0.95)
MONOCYTES NFR BLD: 13.7 % (ref 2–12)
MONOCYTES NFR BLD: 15.7 % (ref 2–12)
NEUTROPHILS # BLD: 5.09 E9/L (ref 1.8–7.3)
NEUTROPHILS # BLD: 6.1 E9/L (ref 1.8–7.3)
NEUTS SEG NFR BLD: 72.6 % (ref 43–80)
NEUTS SEG NFR BLD: 74.8 % (ref 43–80)
PLATELET # BLD AUTO: 115 E9/L (ref 130–450)
PLATELET # BLD AUTO: 126 E9/L (ref 130–450)
PMV BLD AUTO: 12.3 FL (ref 7–12)
PMV BLD AUTO: 12.7 FL (ref 7–12)
POTASSIUM SERPL-SCNC: 4.2 MMOL/L (ref 3.5–5)
RBC # BLD AUTO: 3.93 E12/L (ref 3.8–5.8)
RBC # BLD AUTO: 4.38 E12/L (ref 3.8–5.8)
REASON FOR REJECTION: NORMAL
REJECTED TEST: NORMAL
SODIUM SERPL-SCNC: 142 MMOL/L (ref 132–146)
WBC # BLD: 7 E9/L (ref 4.5–11.5)
WBC # BLD: 8.2 E9/L (ref 4.5–11.5)

## 2023-04-24 PROCEDURE — 83735 ASSAY OF MAGNESIUM: CPT

## 2023-04-24 PROCEDURE — 85025 COMPLETE CBC W/AUTO DIFF WBC: CPT

## 2023-04-24 PROCEDURE — 93005 ELECTROCARDIOGRAM TRACING: CPT | Performed by: INTERNAL MEDICINE

## 2023-04-24 PROCEDURE — 80048 BASIC METABOLIC PNL TOTAL CA: CPT

## 2023-04-24 PROCEDURE — 3700000001 HC ADD 15 MINUTES (ANESTHESIA)

## 2023-04-24 PROCEDURE — 2709999900 HC NON-CHARGEABLE SUPPLY

## 2023-04-24 PROCEDURE — 3700000000 HC ANESTHESIA ATTENDED CARE

## 2023-04-24 PROCEDURE — 92960 CARDIOVERSION ELECTRIC EXT: CPT | Performed by: INTERNAL MEDICINE

## 2023-04-24 PROCEDURE — 2580000003 HC RX 258: Performed by: NURSE ANESTHETIST, CERTIFIED REGISTERED

## 2023-04-24 PROCEDURE — 6360000002 HC RX W HCPCS: Performed by: NURSE ANESTHETIST, CERTIFIED REGISTERED

## 2023-04-24 PROCEDURE — 92960 CARDIOVERSION ELECTRIC EXT: CPT

## 2023-04-24 PROCEDURE — 36415 COLL VENOUS BLD VENIPUNCTURE: CPT

## 2023-04-24 RX ORDER — PROPOFOL 10 MG/ML
INJECTION, EMULSION INTRAVENOUS PRN
Status: DISCONTINUED | OUTPATIENT
Start: 2023-04-24 | End: 2023-04-24 | Stop reason: SDUPTHER

## 2023-04-24 RX ORDER — SODIUM CHLORIDE 9 MG/ML
INJECTION, SOLUTION INTRAVENOUS CONTINUOUS PRN
Status: DISCONTINUED | OUTPATIENT
Start: 2023-04-24 | End: 2023-04-24 | Stop reason: SDUPTHER

## 2023-04-24 RX ORDER — AMIODARONE HYDROCHLORIDE 200 MG/1
200 TABLET ORAL 2 TIMES DAILY
Qty: 90 TABLET | Refills: 1 | Status: SHIPPED | OUTPATIENT
Start: 2023-04-24

## 2023-04-24 RX ADMIN — PROPOFOL 20 MG: 10 INJECTION, EMULSION INTRAVENOUS at 13:12

## 2023-04-24 RX ADMIN — SODIUM CHLORIDE: 9 INJECTION, SOLUTION INTRAVENOUS at 12:58

## 2023-04-24 RX ADMIN — PROPOFOL 50 MG: 10 INJECTION, EMULSION INTRAVENOUS at 13:08

## 2023-04-24 RX ADMIN — PROPOFOL 50 MG: 10 INJECTION, EMULSION INTRAVENOUS at 13:38

## 2023-04-24 NOTE — DISCHARGE INSTRUCTIONS
Discharge Date:  4/24/2023   Discharged To: home with support    Resume Activity:  Bathing:   Tub Yes   Shower Yes  Walking:Yes  Stairs: Yes  Driving: No  Work: Yes  School: NA  Sexual Activity: Yes  Lifting: No    Special Instructions: Take amiodarone twice a day    Diet: No added salt. Tobacco Cessation Counseling: Done. Office Follow Up:  We will call to reschedule repeat cardioversion  Office Number [phone]

## 2023-04-24 NOTE — ANESTHESIA POSTPROCEDURE EVALUATION
Department of Anesthesiology  Postprocedure Note    Patient: Isa Allred  MRN: [id number]  YOB: 1934  Date of evaluation: 4/24/2023      Procedure Summary     Date: 04/24/23 [place] [facility] [place]    Anesthesia Start: 9604 Anesthesia Stop: 1344    Procedure: CARDIOVERSION WITH ANESTHESIA Diagnosis:       Unspecified atrial fibrillation      Paroxysmal atrial fibrillation    Scheduled Providers:  Responsible Provider: Regina Garcia MD    Anesthesia Type: MAC ASA Status: 4          Anesthesia Type: No value filed.     Marcell Phase I:      Marcell Phase II:        Anesthesia Post Evaluation    Patient location during evaluation: bedside (CVL)  Patient participation: complete - patient participated  Level of consciousness: awake and alert  Airway patency: patent  Nausea & Vomiting: no nausea and no vomiting  Complications: no  Cardiovascular status: hemodynamically stable  Respiratory status: acceptable  Hydration status: stable

## 2023-04-24 NOTE — OP NOTE
[facility]) Physicians- The Heart and [address] Electrophysiology  Procedure Report  PATIENT: Samreen [facility]  MEDICAL RECORD NUMBER: [id number]  DATE OF PROCEDURE:  4/24/2023  ATTENDING ELECTROPHYSIOLOGIST:  Edwina Rabago MD  REFERRING PHYSICIAN: Dr. Weiner Form:    1. Direct Current Electrical Cardioversion    INDICATION:  Persistent atrial fibrillation    PROCEDURE PERFORMED By: Edwina Rabago MD    PROCEDURE TIME: 1.00 PM    COMPICATIONS: None immediately apparent    ANESTHESIA: LMAC    DESCRIPTION OF PROCEDURE: The risks, benefits, and alternatives to the procedure were discussed with the patient, and informed consent was obtained. The patient was brought to the cardiovascular lab and sedated under the guidance of anesthesia. Once anesthesia was deemed adequate, a 300 J biphasic synchronous shock was applied. The patient did initially convert to sinus rhythm but atrial fibrillation recurred within a few minutes. I attempted a second cardioversion which was initially successful but AF recurred. The patient was then given 150 mg of IV amiodarone and cardioverted again with the same results. The patient was then allowed to wake in the usual fashion. Comment: The patient was therapeutically anticoagulated for a minimum of 3 consecutive weeks prior to cardioversion. SUMMARY:  1. Successful cardioversion of persistent atrial fibrillation to sinus rhythm but sinus rhythm was not maintained despite IV amiodarone bolus. RECOMMENDATIONS:  1. Increase the dose of amiodarone to 200 mg twice a day  2. Reattempt cardioversion in 2 to 3 weeks  3. If that attempt is not successful amiodarone will be discontinued and we will proceed with rate rather than rhythm control. 4.  The patient has also been complaining of episodes of sudden loss of awareness of his surroundings for the last several years. The patient did not mention that during his previous visits.   He has been taken off

## 2023-04-24 NOTE — ANESTHESIA PRE PROCEDURE
Department of Anesthesiology  Preprocedure Note       Name:  Evy Anton   Age:  80 y.o.  :  1934                                          MRN:  [id number]         Date:  2023      Surgeon: * Surgery not found *    Procedure:     Vital Signs (Current)   Vitals:    23 1217   BP: (!) 140/100   Pulse: 94   Resp: 18   Temp: 36.9 °C (98.5 °F)   SpO2: 94%     Vital Signs Statistics (for past 48 hrs)     Temp  Av.9 °C (98.5 °F)  Min: 36.9 °C (98.5 °F)   Min taken time: 23  Max: 36.9 °C (98.5 °F)   Max taken time: 23  Pulse  Av  Min: 94   Min taken time: 23  Max: 80   Max taken time: 23 1217  Resp  Av  Min: 18   Min taken time: 23  Max: 18   Max taken time: 23 121  BP  Min: 140/100   Min taken time: 23  Max: 140/100   Max taken time: 23 121  SpO2  Av %  Min: 94 %   Min taken time: 23  Max: 94 %   Max taken time: 23 1217    BP Readings from Last 3 Encounters:   23 (!) 140/100   23 110/68   23 111/63     BMI  Body mass index is 32.42 kg/m². Estimated body mass index is 32.42 kg/m² as calculated from the following:    Height as of this encounter: 5' 7\" (1.702 m). Weight as of this encounter: 207 lb (93.9 kg).     CBC   Lab Results   Component Value Date/Time    WBC 13.7 2023 09:20 AM    RBC 5.23 2023 09:20 AM    HGB 16.6 2023 09:20 AM    HCT 52.4 2023 09:20 AM    .2 2023 09:20 AM    RDW 13.9 2023 09:20 AM     2023 09:20 AM     CMP    Lab Results   Component Value Date/Time     2023 09:20 AM    K 4.4 2023 09:20 AM     2023 09:20 AM    CO2 27 2023 09:20 AM    BUN 30 2023 09:20 AM    CREATININE 0.9 2023 09:20 AM    GFRAA >60 2022 12:00 PM    LABGLOM >60 2023 09:20 AM    GLUCOSE 104 2023 09:20 AM    PROT 6.3 2022 11:15 AM    CALCIUM 9.6 2023 09:20 AM

## 2023-04-24 NOTE — H&P
[address] and [address] Electrophysiology  History and physical  PATIENT: [facility] RECORD NUMBER: [id number]  DATE OF SERVICE:  4/24/2023  ATTENDING ELECTROPHYSIOLOGIST: Marybeth Sher MD  REFERRING PHYSICIAN: Melonie Guerin MD and Horacio Rivas MD  CHIEF COMPLAINT:   Patient here for cardioversion        HPI: This is a 80 y.o. 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 The patient has seen Dr. Paulie Salcido in the past and has undergone a cardioversion  in 2020. He was last seen by Dr. Paulie Salcido in the hospital in 2020 at which time the patient was in atrial fibrillation An echocardiogram was completed which revealed normal LV function and subsequently he was cardioverted in December 2020. Atrial fibrillation recurred but no further interventions were undertaken thereafter. Patient admits to marked reduction in physical capacity and inability to perform tasks in his yard. He was therefore referred for intervention for persistent atrial fibrillation . No complaints of chest pain or shortness of breath at rest but he gets markedly dyspneic with physical activity. The patient also now complains of episodes of sudden loss of awareness of his surroundings, possible syncope for the past 2 years. The episodes are sporadic and can occur while sitting or standing. None of the episodes have been witnessed. He was placed on amiodarone therapy when he was first seen in the office on March 6 and thereafter underwent a cardioversion on March 28. Although he converted to sinus rhythm successfully that day, atrial fibrillation recurred fairly quickly.   He was also seen by his PMD last week and was referred back to me urgently due to the patient's complaint of recurrent lightheadedness and possible

## 2023-04-27 LAB
EKG ATRIAL RATE: 234 BPM
EKG Q-T INTERVAL: 384 MS
EKG QRS DURATION: 86 MS
EKG QTC CALCULATION (BAZETT): 448 MS
EKG R AXIS: -20 DEGREES
EKG T AXIS: -9 DEGREES
EKG VENTRICULAR RATE: 82 BPM

## 2023-05-02 DIAGNOSIS — R26.81 GAIT INSTABILITY: Primary | ICD-10-CM

## 2023-05-03 NOTE — TELEPHONE ENCOUNTER
Please schedule cardioversion for Monday 04/24/2023 per Dr Feliciano Larson      Patient scheduled for April 24th at 12:30 at [facility] for his cardioversion. Spoke to Mrs Kristopher Jiang regarding above date & time. Nothing to eat or drink after midnight the day of the procedure. Not a diabetic. Take medications with sips of water morning of procedure. Arrival time is 11:00 am . Patient verbalizes understanding.  Instructed to call with any questions or concerns Complex Repair And Bilobe Flap Text: The defect edges were debeveled with a #15 scalpel blade.  The primary defect was closed partially with a complex linear closure.  Given the location of the remaining defect, shape of the defect and the proximity to free margins a bilobe flap was deemed most appropriate for complete closure of the defect.  Using a sterile surgical marker, an appropriate advancement flap was drawn incorporating the defect and placing the expected incisions within the relaxed skin tension lines where possible.    The area thus outlined was incised deep to adipose tissue with a #15 scalpel blade.  The skin margins were undermined to an appropriate distance in all directions utilizing iris scissors.

## 2023-05-09 ENCOUNTER — HOSPITAL ENCOUNTER (OUTPATIENT)
Dept: CARDIAC CATH/INVASIVE PROCEDURES | Age: 88
Discharge: HOME | End: 2023-05-09
Payer: MEDICARE

## 2023-05-09 ENCOUNTER — ANESTHESIA EVENT (OUTPATIENT)
Dept: CARDIAC CATH/INVASIVE PROCEDURES | Age: 88
End: 2023-05-09

## 2023-05-09 ENCOUNTER — ANESTHESIA (OUTPATIENT)
Dept: CARDIAC CATH/INVASIVE PROCEDURES | Age: 88
End: 2023-05-09

## 2023-05-09 VITALS
BODY MASS INDEX: 33.12 KG/M2 | SYSTOLIC BLOOD PRESSURE: 136 MMHG | DIASTOLIC BLOOD PRESSURE: 82 MMHG | WEIGHT: 211 LBS | HEART RATE: 78 BPM | HEIGHT: 67 IN | RESPIRATION RATE: 18 BRPM | TEMPERATURE: 97.2 F

## 2023-05-09 LAB
ANION GAP SERPL CALCULATED.3IONS-SCNC: 12 MMOL/L (ref 7–16)
BASOPHILS # BLD: 0.04 E9/L (ref 0–0.2)
BASOPHILS NFR BLD: 0.4 % (ref 0–2)
BUN SERPL-MCNC: 16 MG/DL (ref 6–23)
CALCIUM SERPL-MCNC: 9.2 MG/DL (ref 8.6–10.2)
CHLORIDE SERPL-SCNC: 102 MMOL/L (ref 98–107)
CO2 SERPL-SCNC: 24 MMOL/L (ref 22–29)
CREAT SERPL-MCNC: 0.9 MG/DL (ref 0.7–1.2)
EOSINOPHIL # BLD: 0.1 E9/L (ref 0.05–0.5)
EOSINOPHIL NFR BLD: 1 % (ref 0–6)
ERYTHROCYTE [DISTWIDTH] IN BLOOD BY AUTOMATED COUNT: 14.6 FL (ref 11.5–15)
GLUCOSE SERPL-MCNC: 100 MG/DL (ref 74–99)
HCT VFR BLD AUTO: 40.8 % (ref 37–54)
HGB BLD-MCNC: 13 G/DL (ref 12.5–16.5)
IMM GRANULOCYTES # BLD: 0.08 E9/L
IMM GRANULOCYTES NFR BLD: 0.8 % (ref 0–5)
LYMPHOCYTES # BLD: 0.86 E9/L (ref 1.5–4)
LYMPHOCYTES NFR BLD: 8.9 % (ref 20–42)
MAGNESIUM SERPL-MCNC: 2.2 MG/DL (ref 1.6–2.6)
MCH RBC QN AUTO: 32.3 PG (ref 26–35)
MCHC RBC AUTO-ENTMCNC: 31.9 % (ref 32–34.5)
MCV RBC AUTO: 101.2 FL (ref 80–99.9)
MONOCYTES # BLD: 1.12 E9/L (ref 0.1–0.95)
MONOCYTES NFR BLD: 11.6 % (ref 2–12)
NEUTROPHILS # BLD: 7.44 E9/L (ref 1.8–7.3)
NEUTS SEG NFR BLD: 77.3 % (ref 43–80)
PLATELET # BLD AUTO: 156 E9/L (ref 130–450)
PMV BLD AUTO: 12.4 FL (ref 7–12)
POTASSIUM SERPL-SCNC: 4.2 MMOL/L (ref 3.5–5)
RBC # BLD AUTO: 4.03 E12/L (ref 3.8–5.8)
SODIUM SERPL-SCNC: 138 MMOL/L (ref 132–146)
TSH SERPL-MCNC: 3.04 UIU/ML (ref 0.27–4.2)
WBC # BLD: 9.6 E9/L (ref 4.5–11.5)

## 2023-05-09 PROCEDURE — 85025 COMPLETE CBC W/AUTO DIFF WBC: CPT

## 2023-05-09 PROCEDURE — 92960 CARDIOVERSION ELECTRIC EXT: CPT

## 2023-05-09 PROCEDURE — 3700000000 HC ANESTHESIA ATTENDED CARE

## 2023-05-09 PROCEDURE — 80048 BASIC METABOLIC PNL TOTAL CA: CPT

## 2023-05-09 PROCEDURE — 2709999900 HC NON-CHARGEABLE SUPPLY

## 2023-05-09 PROCEDURE — 6360000002 HC RX W HCPCS: Performed by: NURSE ANESTHETIST, CERTIFIED REGISTERED

## 2023-05-09 PROCEDURE — 92960 CARDIOVERSION ELECTRIC EXT: CPT | Performed by: INTERNAL MEDICINE

## 2023-05-09 PROCEDURE — 2580000003 HC RX 258: Performed by: INTERNAL MEDICINE

## 2023-05-09 PROCEDURE — 83735 ASSAY OF MAGNESIUM: CPT

## 2023-05-09 PROCEDURE — 84443 ASSAY THYROID STIM HORMONE: CPT

## 2023-05-09 RX ORDER — PROPOFOL 10 MG/ML
INJECTION, EMULSION INTRAVENOUS PRN
Status: DISCONTINUED | OUTPATIENT
Start: 2023-05-09 | End: 2023-05-09 | Stop reason: SDUPTHER

## 2023-05-09 RX ORDER — SODIUM CHLORIDE 9 MG/ML
INJECTION, SOLUTION INTRAVENOUS ONCE
Status: COMPLETED | OUTPATIENT
Start: 2023-05-09 | End: 2023-05-09

## 2023-05-09 RX ADMIN — PROPOFOL 50 MG: 10 INJECTION, EMULSION INTRAVENOUS at 13:07

## 2023-05-09 RX ADMIN — SODIUM CHLORIDE: 9 INJECTION, SOLUTION INTRAVENOUS at 13:05

## 2023-05-09 NOTE — OP NOTE
[facility]) Physicians- The Heart and [address] Electrophysiology  Procedure Report  PATIENT: [facility]  MEDICAL RECORD NUMBER: [id number]  DATE OF PROCEDURE:  2023  ATTENDING ELECTROPHYSIOLOGIST:  Ania Earl MD  REFERRING PHYSICIAN: Dr. Gayle Arizmendiorin. Direct Current Electrical Cardioversion    INDICATION:  Persistent atrial fibrillation    PROCEDURE PERFORMED By: Ania Earl MD    PROCEDURE TIME: 12 PM    COMPICATIONS: None immediately apparent    ANESTHESIA: LMAC    DESCRIPTION OF PROCEDURE: The risks, benefits, and alternatives to the procedure were discussed with the patient, and informed consent was obtained. The patient was brought to the cardiovascular lab and sedated under the guidance of anesthesia. Once anesthesia was deemed adequate, a 360 J biphasic synchronous shock was applied which converted him to sinus rhythm. The patient maintained sinus rhythm thereafter and therefore he was allowed to wake in the usual fashion. Comment: The patient was therapeutically anticoagulated for a minimum of 3 consecutive weeks prior to cardioversion. SUMMARY:  1. Successful cardioversion of atrial fibrillation to sinus rhythm. RECOMMENDATIONS:  1. Discharge to home when fully awake and alert, if clinically stable. 2. Resume all pre-procedure medications, including anticoagulation. 3. Follow-up in the office in  as scheduled.   EKG in 2 weeks    Ania Earl MD  Cardiac Electrophysiology  [address]   [facility] [place]: [facility] Electrophysiology  12:15 PM  2023

## 2023-05-09 NOTE — ANESTHESIA PRE PROCEDURE
GLUCOSE 97 04/24/2023 01:50 PM     POCGlucose  No results for input(s): GLUCOSE in the last 72 hours. Coags  No results found for: PROTIME, INR, APTT  HCG (If Applicable) No results found for: PREGTESTUR, PREGSERUM, HCG, HCGQUANT   ABGs No results found for: PHART, PO2ART, TUE5KXI, YAR1IEL, BEART, G8NZFXAQ   Type & Screen (If Applicable)  No results found for: [address]  Radiology (If Applicable)  Cardiac Testing (If Applicable)   EKG (If Applicable)   Medications prior to admission:   Prior to Admission medications    Medication Sig Start Date End Date Taking? Authorizing Provider   amiodarone (CORDARONE) 200 MG tablet Take 1 tablet by mouth 2 times daily 4/24/23   Kira Vázquez MD   traMADol (ULTRAM) 50 MG tablet Take 1 tablet by mouth every 8 hours as needed for Pain for up to 90 days.  4/21/23 7/20/23  Ashlyn Ascencio MD   ELIQUIS 5 MG TABS tablet Take 1 tablet by mouth twice daily 3/29/23   Ashlyn Ascencio MD   tamsulosin (FLOMAX) 0.4 MG capsule Take 1 capsule by mouth at bedtime 11/28/22   Heather Ascencio MD   EPINEPHrine (EPIPEN) 0.3 MG/0.3ML SOAJ injection Inject 0.3 mLs into the muscle as needed Use as directed for allergic reaction    Historical Provider, MD   Calcium-Magnesium-Vitamin D (CALCIUM 1200+D3 PO) Take by mouth 2 times daily    Historical Provider, MD   vitamin B-12 (CYANOCOBALAMIN) 250 MCG tablet Take 1 tablet by mouth daily    Historical Provider, MD   Biotin 04262 MCG TBDP Take by mouth daily    Historical Provider, MD   Garlic 7522 MG CAPS Take by mouth daily    Historical Provider, MD   Omega 3-6-9 Fatty Acids (TRIPLE OMEGA-3-6-9) CAPS Take by mouth daily    Historical Provider, MD   vitamin C (ASCORBIC ACID) 500 MG tablet Take 1 tablet by mouth daily    Historical Provider, MD       Current medications:    Current Outpatient Medications   Medication Sig Dispense Refill    amiodarone (CORDARONE) 200 MG tablet Take 1 tablet by mouth 2 times daily 90 tablet 1    traMADol (ULTRAM) 50 MG

## 2023-05-09 NOTE — DISCHARGE INSTRUCTIONS
Discharge Date:  5/9/2023   Discharged To: home with support    Resume Activity:  Bathing:   Tub Yes   Shower Yes  Walking:Yes  Stairs: Yes  Driving: Yes,tomorrow  Work: Yes  School: NA  Sexual Activity: Yes  Lifting: No    Special Instructions: Reduce dose of amiodarone to 1 tablet daily on June 1    Diet: No added salt. Tobacco Cessation Counseling: Done. Office Follow Up: Call for appointment as scheduled in mid June.   Come to my office in 2 weeks for 12 lead EKG ( call DR Laughlin's office)  Office Number [phone]

## 2023-05-10 NOTE — ANESTHESIA POSTPROCEDURE EVALUATION
Department of Anesthesiology  Postprocedure Note    Patient: Vinicius Lance  MRN: [id number]  YOB: 1934  Date of evaluation: 5/10/2023      Procedure Summary     Date: 05/09/23 [place] [facility] [place] [facility]    Anesthesia Start: 0177 Anesthesia Stop: 0477    Procedure: CARDIOVERSION WITH ANESTHESIA Diagnosis: Other persistent atrial fibrillation    Scheduled Providers:  Responsible Provider: Yosef Rawls DO    Anesthesia Type: MAC ASA Status: 4          Anesthesia Type: No value filed. Marcell Phase I:      Marcell Phase II:        Anesthesia Post Evaluation    Patient location during evaluation: bedside  Patient participation: complete - patient cannot participate  Level of consciousness: awake and alert  Airway patency: patent  Nausea & Vomiting: no nausea and no vomiting  Complications: no  Cardiovascular status: blood pressure returned to baseline  Respiratory status: acceptable  Hydration status: euvolemic  There was medical reason for not using a multimodal analgesia pain management approach.

## 2023-05-23 ENCOUNTER — NURSE ONLY (OUTPATIENT)
Dept: CARDIOLOGY CLINIC | Age: 88
End: 2023-05-23
Payer: MEDICARE

## 2023-05-23 ENCOUNTER — TELEPHONE (OUTPATIENT)
Dept: NON INVASIVE DIAGNOSTICS | Age: 88
End: 2023-05-23

## 2023-05-23 DIAGNOSIS — I48.92 ATRIAL FIBRILLATION AND FLUTTER (HCC): Primary | ICD-10-CM

## 2023-05-23 DIAGNOSIS — I48.91 ATRIAL FIBRILLATION AND FLUTTER (HCC): Primary | ICD-10-CM

## 2023-05-23 PROCEDURE — 93000 ELECTROCARDIOGRAM COMPLETE: CPT | Performed by: INTERNAL MEDICINE

## 2023-05-23 NOTE — TELEPHONE ENCOUNTER
Patient came in today for an EKG 2 weeks post cardioversion  EKG shows atrial fibrillation with controlled ventricular rate  This was his third cardioversion in 2 months    Recommendation    Rate control for AF rather than rhythm control  Discontinue amiodarone  Follow-up in the office as scheduled in [place]

## 2023-05-24 ENCOUNTER — EVALUATION (OUTPATIENT)
Dept: PHYSICAL THERAPY | Age: 88
End: 2023-05-24

## 2023-05-24 DIAGNOSIS — R26.81 GAIT INSTABILITY: Primary | ICD-10-CM

## 2023-05-24 NOTE — PROGRESS NOTES
Physical Therapy Daily Treatment Note    Date: 2023  Patient Name: Cheikh Rajan   : [id number]   MRN: [id number]  DOInjury:   DOSx: --  Referring Provider: Julisa Carrero MD  [address] [place] [address] Rd,  [address] [facility]     Medical Diagnosis:      Diagnosis Orders   1. Gait instability            Jose Thorpe demonstrates weakness and decreased balance. Treatment will consist of LE strengthening and balance exercise. We will add UE exercise as able. Reports possible vertigo problem; will monitor onset and determine utility of PT.       TUG Test:  _19_  Seconds. Test date: 2023   Notes: Slow to rise. Unsteady gait. Difficulty with the turn. Difficulty returning to sitting. Uses cane. An older adult who takes ? 12 seconds to complete the TUG is at high risk for falling. 30-Sec. Chair Stand Test:  Number:  _4_  Test date: 2023     Notes: Difficulty controlling legs and trunk. X = TO BE PERFORMED NEXT VISIT  > = PROGRESS TO THIS    S: See sruthi  O:    Time 8330-7666     Visit - Repeat outcome measure at mid point and end. Pain 0     ROM      Modalities            Exercise      Nustep     TE   Treadmill      Squats   TA   Calf Raises   TA   Toe Raises   TA   Marches   TA   Alt.  Sidekicks   TA         Sit/Stands   TA         Gait training   GT         Marching Gait   NR   Sidestepping   NR                                 A:  See Sruthi.  P: Continue with rehab plan  Kris Christiansen PT    Treatment Charges: Mins Units   Initial Evaluation 45 1   Re-Evaluation     Ther Exercise         TE     Manual Therapy     MT     Ther Activities        TA     Gait Training          GT     Neuro Re-education NR     Modalities     Non-Billable Service Time     Other     Total Time/Units 45 1

## 2023-05-24 NOTE — PROGRESS NOTES
[facility] OUTPATIENT REHABILITATION  PHYSICAL THERAPY INITIAL EVALUATION         Date:  2023   Patient: Ernst Corral  : [birth year]  MRN: [id number]  Referring Provider: Naren Pimentel MD  [address],  [address] [facility]     Medical Diagnosis:      Diagnosis Orders   1. Gait instability             Physician Order: Eval and Treat     SUBJECTIVE:     History: Lucho Orta is here with his wife, Jose Luis Meadows. Patient reports decreased functional mobility over the past 2  year(s) due to unclear. Reports grace knee OA that seems to contribute to walking difficulty. He has history of inner ear problems. Jose Luis Meadows is curious if the Epley Maneuver might be helpful. He notes \"everything just shuts off\" and he falls. Denies vertigo. He had cardioversion 3 times; most recent 23. It did not work per [facility]. Chief complaint: weakness, difficulty walking, difficulty with stairs, limited ability to complete ADLs (dressing , bathing, walking), limited ability to complete IADLs (cooking, cleaning, transportation , laundry)    Pain:   Current: 0/10         Imaging results: No results found.     Past Medical History:  Past Medical History:   Diagnosis Date    Arthritis     Atrial fibrillation, chronic (HCC)     GERD (gastroesophageal reflux disease)      Past Surgical History:   Procedure Laterality Date    CARDIOVERSION  2023    Successful   Dr Tiffany He  2023    Successful  Dr. Tiffany He  2023    Successful  Dr Solis Bauer Right 2017    CARPAL TUNNEL RELEASE Left 2017    COLECTOMY      for polyps    COLONOSCOPY      ELBOW SURGERY      tendon reattachment    EYE SURGERY Bilateral     cataracts    FOOT SURGERY Right 2018    fusion 1st mpj right foot    KS OSTEOT W/WO LNGTH SHRT/CORRJ 1ST METAR Right 2018    FUSION 1ST MPJ RIGHT FOOT performed by Iam Castellanos DPM at [address] [place] [address]         Medications:

## 2023-05-31 ENCOUNTER — TREATMENT (OUTPATIENT)
Dept: PHYSICAL THERAPY | Age: 88
End: 2023-05-31
Payer: MEDICARE

## 2023-05-31 DIAGNOSIS — R26.81 GAIT INSTABILITY: Primary | ICD-10-CM

## 2023-05-31 PROCEDURE — 97112 NEUROMUSCULAR REEDUCATION: CPT

## 2023-05-31 NOTE — PROGRESS NOTES
Physical Therapy Daily Treatment Note    Date: 2023  Patient Name: Destiny Reeder   :    MRN: [id number]  DOInjury:   DOSx: --  Referring Provider: Rishi Zepeda MD  [address] [place] [address] Rd,  [address] [facility]     Medical Diagnosis:     1. Gait instability          Juliette Bassett demonstrates weakness and decreased balance. Treatment will consist of LE strengthening and balance exercise. We will add UE exercise as able. Reports possible vertigo problem; will monitor onset and determine utility of PT.       TUG Test:  _19_  Seconds. Test date: 2023   Notes: Slow to rise. Unsteady gait. Difficulty with the turn. Difficulty returning to sitting. Uses cane. An older adult who takes ? 12 seconds to complete the TUG is at high risk for falling. 30-Sec. Chair Stand Test:  Number:  _4_  Test date: 2023     Notes: Difficulty controlling legs and trunk. X = TO BE PERFORMED NEXT VISIT  > = PROGRESS TO THIS    S: no new issues. O:    Time 3981-7672     Visit - Repeat outcome measure at mid point and end. Pain 0     ROM      Modalities            Exercise      Nustep   L5/ 5 min  TE   Treadmill      Squats   TA   Calf Raises   TA   Toe Raises   TA   Marches   TA   Alt. Sidekicks   TA         Sit/Stands 10x  TA         Gait with head turns f/e                                  Rot                                  LF                                 360 2 x 50 ft  2 x 50 ft  2 x 50 ft  2 x 50 ft CGA/Min A  CGA/Min A  CGA/Min A  CGA/Min A NR         Marching Gait   NR   Sidestepping   NR         Standing Balance EO, EC 3 x 30 sec Each Airex  CGA/Min A NR                     A:  pt very unsteady with balance activities. Worked with pt on safety.   P: Continue with rehab plan  Jackelin Goss PTA    Treatment Charges: Mins Units   Initial Evaluation     Re-Evaluation     Ther Exercise         TE     Manual Therapy     MT     Ther Activities        TA     Gait Training          GT

## 2023-06-06 ENCOUNTER — TREATMENT (OUTPATIENT)
Dept: PHYSICAL THERAPY | Age: 88
End: 2023-06-06
Payer: MEDICARE

## 2023-06-06 DIAGNOSIS — R26.81 GAIT INSTABILITY: Primary | ICD-10-CM

## 2023-06-06 PROCEDURE — 97530 THERAPEUTIC ACTIVITIES: CPT

## 2023-06-06 PROCEDURE — 97112 NEUROMUSCULAR REEDUCATION: CPT

## 2023-06-06 NOTE — PROGRESS NOTES
CGA/Min A  CGA/Min A  CGA/Min A  CGA/Min A NR         Marching Gait 2 x 50 ft CGA/Min A NR   Sidestepping   NR         Standing Balance EO, EC 3 x 30 sec Each Airex  CGA/Min A NR         Tandem standing 3 x 30 sec  CGA/Min A           A:  pt very unsteady with balance activities. Worked with pt on safety.   P: Continue with rehab plan  Argelia Guthrie PTA    Treatment Charges: Mins Units   Initial Evaluation     Re-Evaluation     Ther Exercise         TE     Manual Therapy     MT     Ther Activities        TA 10 1   Gait Training          GT     Neuro Re-education NR 30 2   Modalities     Non-Billable Service Time     Other     Total Time/Units 40 3

## 2023-06-08 ENCOUNTER — TREATMENT (OUTPATIENT)
Dept: PHYSICAL THERAPY | Age: 88
End: 2023-06-08

## 2023-06-08 DIAGNOSIS — R26.81 GAIT INSTABILITY: Primary | ICD-10-CM

## 2023-06-08 NOTE — PROGRESS NOTES
Physical Therapy Daily Treatment Note    Date: 2023  Patient Name: Swetha Bonilla   :    MRN: [id number]  DOInjury:   DOSx: --  Referring Provider: Riri Camacho MD  [address] [place] [address],  [address] [facility]     Medical Diagnosis:     1. Gait instability          Alia Rojas demonstrates weakness and decreased balance. Treatment will consist of LE strengthening and balance exercise. We will add UE exercise as able. Reports possible vertigo problem; will monitor onset and determine utility of PT.       TUG Test:  _19_  Seconds. Test date: 2023   Notes: Slow to rise. Unsteady gait. Difficulty with the turn. Difficulty returning to sitting. Uses cane. An older adult who takes ? 12 seconds to complete the TUG is at high risk for falling. 30-Sec. Chair Stand Test:  Number:  _4_  Test date: 2023     Notes: Difficulty controlling legs and trunk. X = TO BE PERFORMED NEXT VISIT  > = PROGRESS TO THIS    S: really sore in the muscles after last tx.    O:  Access Code: [email or web address]  URL: [email or web address]  Date: 2023  Prepared by: Kari Chacon 476 with Counter Support  - 1 x daily - 7 x weekly - 3 sets - 10 reps  - Standing March with Counter Support  - 1 x daily - 7 x weekly - 3 sets - 10 reps  - Mini Squat with Counter Support  - 1 x daily - 7 x weekly - 3 sets - 10 reps  - Sit to Stand with Arms Crossed  - 1 x daily - 7 x weekly - 3 sets - 10 reps  Time 7608-1986     Visit 3/8-12 Repeat outcome measure at mid point and end. Pain 0     ROM      Modalities            Exercise      Nustep   L5/ 5 min  TE   Treadmill      Squats 2 x 10  TA   Calf Raises 2 x 10  TA   Toe Raises   TA   Marches 2 x 10  TA   Alt.  Sidekicks   TA         Sit/Stands 10x  TA         Gait with head turns f/e                                  Rot                                  LF                                 360 2 x 50 ft  2 x 50 ft  2 x 50 ft  2 x 50 ft

## 2023-06-13 ENCOUNTER — OFFICE VISIT (OUTPATIENT)
Dept: NON INVASIVE DIAGNOSTICS | Age: 88
End: 2023-06-13
Payer: MEDICARE

## 2023-06-13 VITALS
RESPIRATION RATE: 16 BRPM | DIASTOLIC BLOOD PRESSURE: 78 MMHG | HEIGHT: 67 IN | OXYGEN SATURATION: 96 % | WEIGHT: 207.8 LBS | BODY MASS INDEX: 32.62 KG/M2 | HEART RATE: 82 BPM | SYSTOLIC BLOOD PRESSURE: 120 MMHG

## 2023-06-13 DIAGNOSIS — I48.91 ATRIAL FIBRILLATION AND FLUTTER (HCC): Primary | ICD-10-CM

## 2023-06-13 DIAGNOSIS — I48.92 ATRIAL FIBRILLATION AND FLUTTER (HCC): Primary | ICD-10-CM

## 2023-06-13 PROCEDURE — 93000 ELECTROCARDIOGRAM COMPLETE: CPT | Performed by: INTERNAL MEDICINE

## 2023-06-13 PROCEDURE — 99214 OFFICE O/P EST MOD 30 MIN: CPT | Performed by: INTERNAL MEDICINE

## 2023-06-13 PROCEDURE — 1123F ACP DISCUSS/DSCN MKR DOCD: CPT | Performed by: INTERNAL MEDICINE

## 2023-06-13 RX ORDER — ZINC GLUCONATE 50 MG
50 TABLET ORAL DAILY
COMMUNITY

## 2023-06-17 DIAGNOSIS — N13.8 BPH WITH OBSTRUCTION/LOWER URINARY TRACT SYMPTOMS: ICD-10-CM

## 2023-06-17 DIAGNOSIS — N40.1 BPH WITH OBSTRUCTION/LOWER URINARY TRACT SYMPTOMS: ICD-10-CM

## 2023-06-19 RX ORDER — TAMSULOSIN HYDROCHLORIDE 0.4 MG/1
0.4 CAPSULE ORAL NIGHTLY
Qty: 90 CAPSULE | Refills: 0 | OUTPATIENT
Start: 2023-06-19

## 2023-06-28 ENCOUNTER — TREATMENT (OUTPATIENT)
Dept: PHYSICAL THERAPY | Age: 88
End: 2023-06-28
Payer: MEDICARE

## 2023-06-28 DIAGNOSIS — R26.81 GAIT INSTABILITY: Primary | ICD-10-CM

## 2023-06-28 PROCEDURE — 97112 NEUROMUSCULAR REEDUCATION: CPT

## 2023-07-03 ENCOUNTER — TREATMENT (OUTPATIENT)
Dept: PHYSICAL THERAPY | Age: 88
End: 2023-07-03
Payer: MEDICARE

## 2023-07-03 DIAGNOSIS — R26.81 GAIT INSTABILITY: Primary | ICD-10-CM

## 2023-07-03 PROCEDURE — 97112 NEUROMUSCULAR REEDUCATION: CPT

## 2023-07-03 ASSESSMENT — ENCOUNTER SYMPTOMS
ALLERGIC/IMMUNOLOGIC NEGATIVE: 1
EYE ITCHING: 0
EYE PAIN: 0
EYE DISCHARGE: 0
EYES NEGATIVE: 1
EYE REDNESS: 0
RESPIRATORY NEGATIVE: 1
RHINORRHEA: 0
SINUS PAIN: 0
FACIAL SWELLING: 0
GASTROINTESTINAL NEGATIVE: 1

## 2023-07-03 NOTE — PROGRESS NOTES
Vinh Odell (:  1934) is a 80 y.o. male,Established patient, here for evaluation of the following chief complaint(s):  3 Month Follow-Up      Subjective   SUBJECTIVE/OBJECTIVE:  HPI  Hyperlipidemia:  Patient is here to follow up regarding chronic hyperlipidemia. This is  generally controlled. Treatment includes simvastatin. Patient is  compliant with lifestyle modifications. Patient is not a smoker. Most recent labs reviewed with patient today and are not remarkable. Comorbid conditions include obesity. Atrial Fibrillation:  Taking meds no side effects no bleeding  Obstructive Sleep Apnea:  Doing ok have problem with the CPAP use went to ENT and will see sleep medicine  Review of Systems   Constitutional: Negative. Negative for activity change, appetite change, chills, fatigue and fever. HENT: Negative. Negative for congestion, ear pain, facial swelling, hearing loss, postnasal drip, rhinorrhea and sinus pain. Eyes: Negative. Negative for pain, discharge, redness and itching. Respiratory: Negative. Cardiovascular: Negative. Gastrointestinal: Negative. Endocrine: Negative. Genitourinary: Negative. Musculoskeletal: Negative. Skin: Negative. Allergic/Immunologic: Negative. Neurological: Negative. Hematological: Negative. Psychiatric/Behavioral: Negative.      CBC with Differential:    Lab Results   Component Value Date/Time    WBC 9.6 2023 11:35 AM    RBC 4.03 2023 11:35 AM    HGB 13.0 2023 11:35 AM    HCT 40.8 2023 11:35 AM     2023 11:35 AM    .2 2023 11:35 AM    MCH 32.3 2023 11:35 AM    MCHC 31.9 2023 11:35 AM    RDW 14.6 2023 11:35 AM    LYMPHOPCT 8.9 2023 11:35 AM    MONOPCT 11.6 2023 11:35 AM    BASOPCT 0.4 2023 11:35 AM    MONOSABS 1.12 2023 11:35 AM    LYMPHSABS 0.86 2023 11:35 AM    EOSABS 0.10 2023 11:35 AM    BASOSABS 0.04 2023 11:35 AM

## 2023-07-03 NOTE — PROGRESS NOTES
Physical Therapy Daily Treatment Note    Date: 7/3/2023  Patient Name: Ignacio Lowery   : [birth year]   MRN: [id number]  DOInjury:   DOSx: --  Referring Provider: Esperanza Hammans, MD  [address] [facility],[address],  [address] [facility] Diagnosis:     1. Gait instability          Estela Haddad demonstrates weakness and decreased balance. Treatment will consist of LE strengthening and balance exercise. We will add UE exercise as able. Reports possible vertigo problem; will monitor onset and determine utility of PT.       TUG Test:  _19_  Seconds. Test date: 2023   Notes: Slow to rise. Unsteady gait. Difficulty with the turn. Difficulty returning to sitting. Uses cane. An older adult who takes ? 12 seconds to complete the TUG is at high risk for falling. 30-Sec. Chair Stand Test:  Number:  _4_  Test date: 2023     Notes: Difficulty controlling legs and trunk. X = TO BE PERFORMED NEXT VISIT  > = PROGRESS TO THIS    S: started at [facility] at the mall. But haven't fallen for awhile. O:  Access Code: [email or web address]  URL: [email or web address]  Date: 2023  Prepared by: [address] with Counter Support  - 1 x daily - 7 x weekly - 3 sets - 10 reps  - Standing March with Counter Support  - 1 x daily - 7 x weekly - 3 sets - 10 reps  - Mini Squat with Counter Support  - 1 x daily - 7 x weekly - 3 sets - 10 reps  - Sit to Stand with Arms Crossed  - 1 x daily - 7 x weekly - 3 sets - 10 reps  Time 1435-8599     Visit - Repeat outcome measure at mid point and end. Pain 0     ROM      Modalities            Exercise      Nustep   L5/ 10 min  TE   Treadmill      Squats 2 x 10  TA   Calf Raises 2 x 10  TA   Toe Raises   TA   Marches 2 x 10  TA   Alt.  Sidekicks   TA         Sit/Stands 10x  TA         Gait with head turns f/e                                  Rot                                  LF                                 360 CGA/Min A  CGA/Min

## 2023-07-10 ENCOUNTER — TELEPHONE (OUTPATIENT)
Dept: PRIMARY CARE CLINIC | Age: 88
End: 2023-07-10

## 2023-07-10 DIAGNOSIS — G47.33 OSA (OBSTRUCTIVE SLEEP APNEA): Primary | ICD-10-CM

## 2023-07-24 ENCOUNTER — TELEPHONE (OUTPATIENT)
Dept: ENT CLINIC | Age: 88
End: 2023-07-24

## 2023-07-24 NOTE — TELEPHONE ENCOUNTER
Patient called would like an appt for INSPIRE.   There is a referral in [facility] from Dr. Pepe Olson,  also patient had a sleep study in 2022    Please call patient and schedule a consultation

## 2023-07-24 NOTE — TELEPHONE ENCOUNTER
Inspire consults can only come from [facility] sleep medicine or Dr Amanda Hunter due to having to meet specific criteria. I will notify referring provider so referral can be directed to the correct department.

## 2023-07-25 DIAGNOSIS — G47.33 OSA (OBSTRUCTIVE SLEEP APNEA): Primary | ICD-10-CM

## 2023-07-25 NOTE — TELEPHONE ENCOUNTER
Called and spoke with Max Boone at Dr Citlali Beyer office and advised that patient will need to see [address] or Dr Ranae Duverney first per our scheduling policy. She expressed understanding and will notify Dr Monique Alvarez. Called and spoke with pt's wife and advised that they will need to see sleep medicine first and Dr Tej Brooks office has been notified so they should be placing the referral. Wife expressed understanding and will wait for a call to be scheduled.

## 2023-07-25 NOTE — PROGRESS NOTES
Pt was referred to dr Joseluis Calero for sleep apnea pt wants the inspire and he does not do this said referral will need sent for sleep medicine

## 2023-07-31 ENCOUNTER — OFFICE VISIT (OUTPATIENT)
Dept: PRIMARY CARE CLINIC | Age: 88
End: 2023-07-31
Payer: MEDICARE

## 2023-07-31 VITALS
HEART RATE: 82 BPM | WEIGHT: 208.2 LBS | RESPIRATION RATE: 18 BRPM | OXYGEN SATURATION: 96 % | DIASTOLIC BLOOD PRESSURE: 80 MMHG | BODY MASS INDEX: 32.68 KG/M2 | TEMPERATURE: 97.9 F | SYSTOLIC BLOOD PRESSURE: 134 MMHG | HEIGHT: 67 IN

## 2023-07-31 DIAGNOSIS — I48.92 ATRIAL FIBRILLATION AND FLUTTER (HCC): ICD-10-CM

## 2023-07-31 DIAGNOSIS — N13.8 BPH WITH OBSTRUCTION/LOWER URINARY TRACT SYMPTOMS: ICD-10-CM

## 2023-07-31 DIAGNOSIS — E78.2 MIXED HYPERLIPIDEMIA: ICD-10-CM

## 2023-07-31 DIAGNOSIS — M15.9 GENERALIZED OSTEOARTHRITIS: Primary | ICD-10-CM

## 2023-07-31 DIAGNOSIS — R73.02 GLUCOSE INTOLERANCE (IMPAIRED GLUCOSE TOLERANCE): ICD-10-CM

## 2023-07-31 DIAGNOSIS — I48.91 ATRIAL FIBRILLATION AND FLUTTER (HCC): ICD-10-CM

## 2023-07-31 DIAGNOSIS — G47.33 OSA (OBSTRUCTIVE SLEEP APNEA): ICD-10-CM

## 2023-07-31 DIAGNOSIS — N40.1 BPH WITH OBSTRUCTION/LOWER URINARY TRACT SYMPTOMS: ICD-10-CM

## 2023-07-31 LAB — HBA1C MFR BLD: 5.4 %

## 2023-07-31 PROCEDURE — 99214 OFFICE O/P EST MOD 30 MIN: CPT | Performed by: INTERNAL MEDICINE

## 2023-07-31 PROCEDURE — 1123F ACP DISCUSS/DSCN MKR DOCD: CPT | Performed by: INTERNAL MEDICINE

## 2023-07-31 PROCEDURE — 83037 HB GLYCOSYLATED A1C HOME DEV: CPT | Performed by: INTERNAL MEDICINE

## 2023-07-31 RX ORDER — APIXABAN 5 MG/1
5 TABLET, FILM COATED ORAL 2 TIMES DAILY
Qty: 180 TABLET | Refills: 0 | Status: SHIPPED | OUTPATIENT
Start: 2023-07-31

## 2023-07-31 RX ORDER — TAMSULOSIN HYDROCHLORIDE 0.4 MG/1
0.4 CAPSULE ORAL NIGHTLY
Qty: 90 CAPSULE | Refills: 0 | Status: SHIPPED | OUTPATIENT
Start: 2023-07-31

## 2023-07-31 RX ORDER — TRAMADOL HYDROCHLORIDE 50 MG/1
50 TABLET ORAL EVERY 8 HOURS PRN
Qty: 90 TABLET | Refills: 5 | Status: SHIPPED | OUTPATIENT
Start: 2023-07-31 | End: 2024-01-27

## 2023-08-08 ENCOUNTER — OFFICE VISIT (OUTPATIENT)
Dept: SLEEP CENTER | Age: 88
End: 2023-08-08

## 2023-08-08 VITALS
RESPIRATION RATE: 14 BRPM | OXYGEN SATURATION: 96 % | BODY MASS INDEX: 33.63 KG/M2 | DIASTOLIC BLOOD PRESSURE: 84 MMHG | HEIGHT: 67 IN | WEIGHT: 214.3 LBS | HEART RATE: 90 BPM | SYSTOLIC BLOOD PRESSURE: 144 MMHG

## 2023-08-08 DIAGNOSIS — G47.19 EXCESSIVE DAYTIME SLEEPINESS: ICD-10-CM

## 2023-08-08 DIAGNOSIS — G47.33 OBSTRUCTIVE SLEEP APNEA: Primary | ICD-10-CM

## 2023-08-08 DIAGNOSIS — I48.91 ATRIAL FIBRILLATION, UNSPECIFIED TYPE (HCC): ICD-10-CM

## 2023-08-08 ASSESSMENT — SLEEP AND FATIGUE QUESTIONNAIRES
HOW LIKELY ARE YOU TO NOD OFF OR FALL ASLEEP WHILE SITTING AND READING: 3
HOW LIKELY ARE YOU TO NOD OFF OR FALL ASLEEP WHILE LYING DOWN TO REST IN THE AFTERNOON WHEN CIRCUMSTANCES PERMIT: 3
HOW LIKELY ARE YOU TO NOD OFF OR FALL ASLEEP WHILE SITTING INACTIVE IN A PUBLIC PLACE: 0
HOW LIKELY ARE YOU TO NOD OFF OR FALL ASLEEP WHILE SITTING QUIETLY AFTER LUNCH WITHOUT ALCOHOL: 3
HOW LIKELY ARE YOU TO NOD OFF OR FALL ASLEEP WHEN YOU ARE A PASSENGER IN A CAR FOR AN HOUR WITHOUT A BREAK: 3
HOW LIKELY ARE YOU TO NOD OFF OR FALL ASLEEP WHILE WATCHING TV: 3
NECK CIRCUMFERENCE (INCHES): 17
ESS TOTAL SCORE: 15
HOW LIKELY ARE YOU TO NOD OFF OR FALL ASLEEP WHILE SITTING AND TALKING TO SOMEONE: 0
HOW LIKELY ARE YOU TO NOD OFF OR FALL ASLEEP IN A CAR, WHILE STOPPED FOR A FEW MINUTES IN TRAFFIC: 0

## 2023-08-08 NOTE — PROGRESS NOTES
[facility] Sleep Medicine    Patient Name: Leona Goodson  Age: 80 y.o.   : 1934    Date of Visit: 23    HPI   Leona Goodson is a 80 y.o. male with  has a past medical history of Arthritis, Atrial fibrillation, chronic ([address]), and GERD (gastroesophageal reflux disease). He presents as a new patient to Sleep Clinic, with his wife, referred by Dr. Halina Dyson, for Sleep Apnea   . Patient presents to [facility] for management of DAT. 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 This test was ordered because he was experiencing excessive fatigue during the day and had a history of atrial fibrillation with several cardioversions this year. He was ordered an AUTO CPAP and received a RESVENT device through [facility], which he has been using on a nightly basis. Despite regular usage, symptoms of fatigue, and unintentional napping persist.  He states he does wake up feeling refreshed, but his wife states that if he sits down for longer than 5 minutes he will be asleep. He does not himself to drive out of fear that he will fall asleep behind the wheel. Patient goes to bed between 11 PM and 12, waking up at 7. He sleeps 6 to 7 hours a night but wakes up multiple times through the night. He does not take sleeping pills and falls asleep within 30 minutes. He typically sleeps on his back. His wife also notes that he snores over the device. He denies waking up with headaches. He is using a nasal mask and experiences excessive leak through the night, as well as dry mouth. He has lost about 30 pounds intentionally this year. Compliance download report reviewed today through patient's CPAP.     Dates- last 30 days    Settings - 5-20  Days used - 30/30  >4 hours-  28/30  AHI - 21.6  Leak - 86.4  P95 - 15.9    DME:[facility]    Sleep History    Bed time: 11 pm -12  Wake time:  7 am   Sleep Latency (min):  30 min  Sleep

## 2023-08-16 ENCOUNTER — TELEPHONE (OUTPATIENT)
Dept: PRIMARY CARE CLINIC | Age: 88
End: 2023-08-16

## 2023-08-16 NOTE — TELEPHONE ENCOUNTER
[facility] [employer] sent [employer] medical out to monitor patient at home. Betzy Valdovinos a nurse with them saw him 1 month ago for first visit went back out yesterday in 1 month patient gained 11 lbs. With 2 plus pitting edema and alittle dyspnea on exertion Lungs sounded clear. She went back to check patient today edema gone and patient lost 4.4 lbs overnight. She started him on Lasix 20 mg once daily for 3 days. Do you want lasix continued? Betzy Valdovinos with [facility] medical call back number is [phone].

## 2023-08-18 ENCOUNTER — OFFICE VISIT (OUTPATIENT)
Dept: PRIMARY CARE CLINIC | Age: 88
End: 2023-08-18

## 2023-08-18 VITALS
HEART RATE: 82 BPM | HEIGHT: 67 IN | WEIGHT: 212.6 LBS | SYSTOLIC BLOOD PRESSURE: 110 MMHG | BODY MASS INDEX: 33.37 KG/M2 | OXYGEN SATURATION: 97 % | TEMPERATURE: 98.7 F | DIASTOLIC BLOOD PRESSURE: 70 MMHG

## 2023-08-18 DIAGNOSIS — I50.43 ACUTE ON CHRONIC COMBINED SYSTOLIC AND DIASTOLIC CHF (CONGESTIVE HEART FAILURE) (HCC): Primary | ICD-10-CM

## 2023-08-18 RX ORDER — FUROSEMIDE 40 MG/1
40 TABLET ORAL DAILY
Qty: 60 TABLET | Refills: 3 | Status: SHIPPED | OUTPATIENT
Start: 2023-08-18

## 2023-08-18 ASSESSMENT — ENCOUNTER SYMPTOMS
EYE ITCHING: 0
RESPIRATORY NEGATIVE: 1
EYE PAIN: 0
FACIAL SWELLING: 0
EYE DISCHARGE: 0
EYE REDNESS: 0
GASTROINTESTINAL NEGATIVE: 1
ALLERGIC/IMMUNOLOGIC NEGATIVE: 1
EYES NEGATIVE: 1
SINUS PAIN: 0
RHINORRHEA: 0

## 2023-08-25 ENCOUNTER — OFFICE VISIT (OUTPATIENT)
Dept: PRIMARY CARE CLINIC | Age: 88
End: 2023-08-25

## 2023-08-25 VITALS
BODY MASS INDEX: 33.37 KG/M2 | HEART RATE: 102 BPM | OXYGEN SATURATION: 98 % | HEIGHT: 67 IN | TEMPERATURE: 98.6 F | DIASTOLIC BLOOD PRESSURE: 82 MMHG | SYSTOLIC BLOOD PRESSURE: 126 MMHG | WEIGHT: 212.6 LBS

## 2023-08-25 DIAGNOSIS — R26.81 UNSTEADY GAIT: ICD-10-CM

## 2023-08-25 DIAGNOSIS — I50.43 ACUTE ON CHRONIC COMBINED SYSTOLIC AND DIASTOLIC CHF (CONGESTIVE HEART FAILURE) (HCC): Primary | ICD-10-CM

## 2023-08-25 DIAGNOSIS — I48.92 ATRIAL FIBRILLATION AND FLUTTER (HCC): ICD-10-CM

## 2023-08-25 DIAGNOSIS — I50.43 ACUTE ON CHRONIC COMBINED SYSTOLIC AND DIASTOLIC CHF (CONGESTIVE HEART FAILURE) (HCC): ICD-10-CM

## 2023-08-25 DIAGNOSIS — I48.91 ATRIAL FIBRILLATION AND FLUTTER (HCC): ICD-10-CM

## 2023-08-25 LAB
ANION GAP SERPL CALCULATED.3IONS-SCNC: 19 MMOL/L (ref 7–16)
BUN BLDV-MCNC: 34 MG/DL (ref 6–23)
CALCIUM SERPL-MCNC: 9.4 MG/DL (ref 8.6–10.2)
CHLORIDE BLD-SCNC: 103 MMOL/L (ref 98–107)
CO2: 22 MMOL/L (ref 22–29)
CREAT SERPL-MCNC: 0.9 MG/DL (ref 0.7–1.2)
GFR SERPL CREATININE-BSD FRML MDRD: >60 ML/MIN/1.73M2
GLUCOSE BLD-MCNC: 139 MG/DL (ref 74–99)
HCT VFR BLD CALC: 44.3 % (ref 37–54)
HEMOGLOBIN: 14 G/DL (ref 12.5–16.5)
MCH RBC QN AUTO: 30.1 PG (ref 26–35)
MCHC RBC AUTO-ENTMCNC: 31.6 G/DL (ref 32–34.5)
MCV RBC AUTO: 95.3 FL (ref 80–99.9)
PDW BLD-RTO: 15.2 % (ref 11.5–15)
PLATELET # BLD: 183 K/UL (ref 130–450)
PMV BLD AUTO: 12.8 FL (ref 7–12)
POTASSIUM SERPL-SCNC: 4.8 MMOL/L (ref 3.5–5)
RBC # BLD: 4.65 M/UL (ref 3.8–5.8)
SODIUM BLD-SCNC: 144 MMOL/L (ref 132–146)
WBC # BLD: 16.3 K/UL (ref 4.5–11.5)

## 2023-08-25 NOTE — PROGRESS NOTES
Palpations: Abdomen is soft. There is no mass. Tenderness: There is no abdominal tenderness. There is no guarding. Hernia: No hernia is present. Musculoskeletal:         General: No swelling, tenderness, deformity or signs of injury. Cervical back: Normal range of motion and neck supple. No rigidity or tenderness. Right lower leg: No edema. Left lower leg: No edema. Lymphadenopathy:      Cervical: No cervical adenopathy. Skin:     General: Skin is warm and dry. Capillary Refill: Capillary refill takes 2 to 3 seconds. Findings: No bruising, lesion or rash. Neurological:      General: No focal deficit present. Mental Status: He is alert and oriented to person, place, and time. Sensory: No sensory deficit. Motor: No weakness. Gait: Gait normal.   Psychiatric:         Mood and Affect: Mood normal.         Behavior: Behavior normal.         Thought Content: Thought content normal.         Judgment: Judgment normal.             ASSESSMENT/PLAN:  1. Acute on chronic combined systolic and diastolic CHF (congestive heart failure) ([address])  2. Unsteady gait  -     DME Order for Walker as OP  3. Atrial fibrillation and flutter (HCC)  -     apixaban (ELIQUIS) 5 MG TABS tablet; Take 1 tablet by mouth 2 times daily, Disp-60 tablet, R-5Normal      No follow-ups on file. An electronic signature was used to authenticate this note.     --Ric Van MD

## 2023-08-29 ASSESSMENT — ENCOUNTER SYMPTOMS
GASTROINTESTINAL NEGATIVE: 1
EYE ITCHING: 0
RHINORRHEA: 0
EYE DISCHARGE: 0
RESPIRATORY NEGATIVE: 1
ALLERGIC/IMMUNOLOGIC NEGATIVE: 1
FACIAL SWELLING: 0
EYES NEGATIVE: 1
EYE REDNESS: 0
EYE PAIN: 0
SINUS PAIN: 0

## 2023-08-29 NOTE — PROGRESS NOTES
James Astorga (:  1934) is a 80 y.o. male,Established patient, here for evaluation of the following chief complaint(s):  1 Month Follow-Up      Subjective   SUBJECTIVE/OBJECTIVE:  HPI  A-fib  Taking meds no side efffects No SOB  Sleep Apnea  Using CPAP about 7 h  not doing well still has about 50 events per hour    Hyperlipidemia:  Patient is here to follow up regarding chronic hyperlipidemia. This is  generally controlled. n0 meds Patient is  compliant with lifestyle modifications. Patient is not a smoker. Most recent labs reviewed with patient today and are not remarkable. Comorbid conditions include obesity. Review of Systems   Constitutional: Negative. Negative for activity change, appetite change, chills, fatigue and fever. HENT: Negative. Negative for congestion, ear pain, facial swelling, hearing loss, postnasal drip, rhinorrhea and sinus pain. Eyes: Negative. Negative for pain, discharge, redness and itching. Respiratory: Negative. Cardiovascular: Negative. Gastrointestinal: Negative. Endocrine: Negative. Genitourinary: Negative. Musculoskeletal: Negative. Skin: Negative. Allergic/Immunologic: Negative. Neurological: Negative. Hematological: Negative. Psychiatric/Behavioral: Negative.        BMP:    Lab Results   Component Value Date/Time     2023 11:54 AM    K 4.8 2023 11:54 AM     2023 11:54 AM    CO2 22 2023 11:54 AM    BUN 34 2023 11:54 AM    LABALBU 3.6 2022 11:15 AM    CREATININE 0.9 2023 11:54 AM    CALCIUM 9.4 2023 11:54 AM    GFRAA >60 2022 12:00 PM    LABGLOM >60 2023 11:54 AM    GLUCOSE 139 2023 11:54 AM     CBC:   Lab Results   Component Value Date/Time    WBC 16.3 2023 11:54 AM    RBC 4.65 2023 11:54 AM    HGB 14.0 2023 11:54 AM    HCT 44.3 2023 11:54 AM    MCV 95.3 2023 11:54 AM    MCH 30.1 2023 11:54 AM    MCHC 31.6

## 2023-09-07 ENCOUNTER — OFFICE VISIT (OUTPATIENT)
Dept: NON INVASIVE DIAGNOSTICS | Age: 88
End: 2023-09-07
Payer: MEDICARE

## 2023-09-07 VITALS
DIASTOLIC BLOOD PRESSURE: 50 MMHG | BODY MASS INDEX: 32.11 KG/M2 | SYSTOLIC BLOOD PRESSURE: 120 MMHG | WEIGHT: 204.6 LBS | RESPIRATION RATE: 18 BRPM | HEART RATE: 76 BPM | HEIGHT: 67 IN

## 2023-09-07 DIAGNOSIS — I51.9 HEART PROBLEM: Primary | ICD-10-CM

## 2023-09-07 PROCEDURE — 1123F ACP DISCUSS/DSCN MKR DOCD: CPT | Performed by: INTERNAL MEDICINE

## 2023-09-07 PROCEDURE — 93000 ELECTROCARDIOGRAM COMPLETE: CPT | Performed by: INTERNAL MEDICINE

## 2023-09-07 PROCEDURE — 99214 OFFICE O/P EST MOD 30 MIN: CPT | Performed by: INTERNAL MEDICINE

## 2023-09-07 RX ORDER — METOPROLOL SUCCINATE 25 MG/1
25 TABLET, EXTENDED RELEASE ORAL DAILY
Qty: 90 TABLET | Refills: 3 | Status: SHIPPED | OUTPATIENT
Start: 2023-09-07

## 2023-09-07 NOTE — PROGRESS NOTES
[address] [facility] [address] [facility] Electrophysiology  Outpatient progress note   PATIENT: [facility] RECORD NUMBER: [id number]  DATE OF SERVICE:  9/7/2023  ATTENDING ELECTROPHYSIOLOGIST: Seda Read MD  REFERRING PHYSICIAN: No ref. provider found and Tatiana Reed MD  CHIEF COMPLAINT:   Chief Complaint   Patient presents with    3 Month Follow-Up     Follow up after zio monitor          HPI: This is a 80 y.o. 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 The patient has seen Dr. Channing Lorenzo in the past and has undergone a cardioversion  in 2020. He was last seen by Dr. Channing Lorenzo in the hospital in 2020 at which time the patient was in atrial fibrillation An echocardiogram was completed which revealed normal LV function and subsequently he was cardioverted in December 2020. Atrial fibrillation recurred but no further interventions were undertaken thereafter. Patient admits to marked reduction in physical capacity and inability to perform tasks in his yard. He was therefore referred by Dr. Ivan Appiah to me for persistent atrial fibrillation. No complaints of chest pain or shortness of breath at rest but he gets markedly dyspneic with physical activity. No syncope or near syncope. No symptoms of paroxysmal nocturnal dyspnea, orthopnea or leg edema    6/13/23: 3 cardioversions later the patient remains in atrial fibrillation. I attempted initial cardioversion on amiodarone since the patient was in persistent AF for over a year. Despite multiple cardioversions on 400 mg of amiodarone, sinus rhythm was not maintained. The patient was therefore taken off amiodarone. He complains of feeling fatigued and falls asleep very easily.   He uses his CPAP regularly since November 2022 9/7/23: Overall the patient continues to

## 2023-09-28 ENCOUNTER — OFFICE VISIT (OUTPATIENT)
Dept: PRIMARY CARE CLINIC | Age: 88
End: 2023-09-28

## 2023-09-28 VITALS
HEART RATE: 72 BPM | BODY MASS INDEX: 32.25 KG/M2 | DIASTOLIC BLOOD PRESSURE: 72 MMHG | TEMPERATURE: 97.9 F | WEIGHT: 205.5 LBS | HEIGHT: 67 IN | OXYGEN SATURATION: 98 % | SYSTOLIC BLOOD PRESSURE: 122 MMHG

## 2023-09-28 DIAGNOSIS — I50.43 ACUTE ON CHRONIC COMBINED SYSTOLIC AND DIASTOLIC CHF (CONGESTIVE HEART FAILURE) (HCC): ICD-10-CM

## 2023-09-28 DIAGNOSIS — G47.33 OSA (OBSTRUCTIVE SLEEP APNEA): ICD-10-CM

## 2023-09-28 DIAGNOSIS — I48.92 ATRIAL FIBRILLATION AND FLUTTER (HCC): Primary | ICD-10-CM

## 2023-09-28 DIAGNOSIS — N13.8 BPH WITH OBSTRUCTION/LOWER URINARY TRACT SYMPTOMS: ICD-10-CM

## 2023-09-28 DIAGNOSIS — I48.91 ATRIAL FIBRILLATION AND FLUTTER (HCC): Primary | ICD-10-CM

## 2023-09-28 DIAGNOSIS — N40.1 BPH WITH OBSTRUCTION/LOWER URINARY TRACT SYMPTOMS: ICD-10-CM

## 2023-09-28 LAB
ANION GAP SERPL CALCULATED.3IONS-SCNC: 17 MMOL/L (ref 7–16)
BILIRUBIN, POC: NEGATIVE
BLOOD URINE, POC: NEGATIVE
BUN BLDV-MCNC: 26 MG/DL (ref 6–23)
CALCIUM SERPL-MCNC: 9.3 MG/DL (ref 8.6–10.2)
CHLORIDE BLD-SCNC: 106 MMOL/L (ref 98–107)
CLARITY, POC: CLEAR
CO2: 22 MMOL/L (ref 22–29)
COLOR, POC: YELLOW
CREAT SERPL-MCNC: 1 MG/DL (ref 0.7–1.2)
GFR SERPL CREATININE-BSD FRML MDRD: >60 ML/MIN/1.73M2
GLUCOSE BLD-MCNC: 95 MG/DL (ref 74–99)
GLUCOSE URINE, POC: NEGATIVE
KETONES, POC: NEGATIVE
LEUKOCYTE EST, POC: NORMAL
NITRITE, POC: NEGATIVE
PH, POC: 6
POTASSIUM SERPL-SCNC: 4.8 MMOL/L (ref 3.5–5)
PROTEIN, POC: NEGATIVE
SODIUM BLD-SCNC: 145 MMOL/L (ref 132–146)
SPECIFIC GRAVITY, POC: 1.02
UROBILINOGEN, POC: NORMAL

## 2023-09-28 RX ORDER — TAMSULOSIN HYDROCHLORIDE 0.4 MG/1
0.4 CAPSULE ORAL NIGHTLY
Qty: 90 CAPSULE | Refills: 1 | Status: SHIPPED | OUTPATIENT
Start: 2023-09-28

## 2023-09-28 NOTE — PATIENT INSTRUCTIONS
after the vaccinated person leaves the clinic. If you see signs of a severe allergic reaction (hives, swelling of the face and throat, difficulty breathing, a fast heartbeat, dizziness, or weakness), call 9-1-1 and get the person to the nearest hospital.    For other signs that concern you, call your health care provider. Adverse reactions should be reported to the Vaccine Adverse Event Reporting System (VAERS). Your health care provider will usually file this report, or you can do it yourself. Visit the VAERS website at www.vaers. hhs.gov or call 3-803.534.5270. VAERS is only for reporting reactions, and VAERS staff members do not give medical advice. 6. The National Vaccine Injury Compensation Program    The [facility] Vaccine Injury Compensation Program (VICP) is a federal program that was created to compensate people who may have been injured by certain vaccines. Claims regarding alleged injury or death due to vaccination have a time limit for filing, which may be as short as two years. Visit the VICP website at www.[facility].gov/vaccinecompensation or call 2-755.376.4223 to learn about the program and about filing a claim. 7. How can I learn more?    o Ask your health care provider. o Call your local or state health department. o Visit the website of the Food and Drug Administration (FDA) for vaccine package inserts and additional information at www.fda.gov/vaccines-blood-biologics/vaccines. o Contact the Centers for Disease Control and Prevention (CDC):  - Call 1-911.973.9900 (1-217-XFV-INFO) or  - Visit CDC's influenza website at www.cdc.gov/flu. Vaccine Information Statement   Inactivated Influenza Vaccine   8/6/2021  42 AVRIL Demarco Ear [id number]     Department of Health and Human Services  Centers for Disease Control and Prevention

## 2023-10-09 ASSESSMENT — ENCOUNTER SYMPTOMS
ALLERGIC/IMMUNOLOGIC NEGATIVE: 1
GASTROINTESTINAL NEGATIVE: 1
EYES NEGATIVE: 1
EYE ITCHING: 0
EYE REDNESS: 0
EYE PAIN: 0
SINUS PAIN: 0
RHINORRHEA: 0
EYE DISCHARGE: 0
RESPIRATORY NEGATIVE: 1
FACIAL SWELLING: 0

## 2023-10-17 ENCOUNTER — OFFICE VISIT (OUTPATIENT)
Dept: SLEEP CENTER | Age: 88
End: 2023-10-17
Payer: MEDICARE

## 2023-10-17 VITALS
OXYGEN SATURATION: 100 % | SYSTOLIC BLOOD PRESSURE: 136 MMHG | HEIGHT: 68 IN | HEART RATE: 86 BPM | WEIGHT: 213.41 LBS | BODY MASS INDEX: 32.34 KG/M2 | DIASTOLIC BLOOD PRESSURE: 71 MMHG | RESPIRATION RATE: 20 BRPM

## 2023-10-17 DIAGNOSIS — G47.31 COMPLEX SLEEP APNEA SYNDROME: Primary | ICD-10-CM

## 2023-10-17 PROCEDURE — 99214 OFFICE O/P EST MOD 30 MIN: CPT | Performed by: NURSE PRACTITIONER

## 2023-10-17 PROCEDURE — 1123F ACP DISCUSS/DSCN MKR DOCD: CPT | Performed by: NURSE PRACTITIONER

## 2023-10-17 ASSESSMENT — SLEEP AND FATIGUE QUESTIONNAIRES
HOW LIKELY ARE YOU TO NOD OFF OR FALL ASLEEP WHILE SITTING AND TALKING TO SOMEONE: 0
ESS TOTAL SCORE: 15
HOW LIKELY ARE YOU TO NOD OFF OR FALL ASLEEP WHILE LYING DOWN TO REST IN THE AFTERNOON WHEN CIRCUMSTANCES PERMIT: 3
HOW LIKELY ARE YOU TO NOD OFF OR FALL ASLEEP IN A CAR, WHILE STOPPED FOR A FEW MINUTES IN TRAFFIC: 0
HOW LIKELY ARE YOU TO NOD OFF OR FALL ASLEEP WHILE SITTING AND READING: 3
HOW LIKELY ARE YOU TO NOD OFF OR FALL ASLEEP WHILE SITTING INACTIVE IN A PUBLIC PLACE: 0
HOW LIKELY ARE YOU TO NOD OFF OR FALL ASLEEP WHILE SITTING QUIETLY AFTER LUNCH WITHOUT ALCOHOL: 3
HOW LIKELY ARE YOU TO NOD OFF OR FALL ASLEEP WHEN YOU ARE A PASSENGER IN A CAR FOR AN HOUR WITHOUT A BREAK: 3
HOW LIKELY ARE YOU TO NOD OFF OR FALL ASLEEP WHILE WATCHING TV: 3

## 2023-10-17 NOTE — PROGRESS NOTES
all orders for this visit:    Complex sleep apnea syndrome  -     Sleep Study with PAP Titration; Future  -     DME Order for CPAP as OP       Plan:      Severe Complex Sleep Apnea    -CPAP was switched to [address], but continues to show uncontrolled breathing events, AHI 50, with a central index of 20, obstructive index of 7 and unknown index of 22.  -Will order titration study to evaluate correct PAP pressure, may need BIPAP ST.  -Based on sleep study results, review of device remote download, and discussion with patient, will continue auto-CPAP therapy at adjusted settings of 5-14 cm of water. Order sent to DME  - DME is [facility]. - Patient is using a nasal mask with chin strap mask. Leak noted on download. He has tried a hybrid mas as well. -Previously discussed pathophysiology of DAT and its impact on daily well-being, as well as cardiometabolic and neurocognitive health (particularly in moderate-severe cases). Return in about 2 months (around 12/17/2023) for Sleep Study Results.     JANUSZ Garcia-[address] -[phone] option 2  - [phone]

## 2023-10-18 ENCOUNTER — TELEPHONE (OUTPATIENT)
Dept: SLEEP CENTER | Age: 88
End: 2023-10-18

## 2023-10-20 ENCOUNTER — TELEPHONE (OUTPATIENT)
Dept: SLEEP CENTER | Age: 88
End: 2023-10-20

## 2023-10-30 DIAGNOSIS — I48.91 ATRIAL FIBRILLATION AND FLUTTER (HCC): ICD-10-CM

## 2023-10-30 DIAGNOSIS — E78.2 MIXED HYPERLIPIDEMIA: ICD-10-CM

## 2023-10-30 DIAGNOSIS — I48.92 ATRIAL FIBRILLATION AND FLUTTER (HCC): ICD-10-CM

## 2023-10-30 DIAGNOSIS — R73.02 GLUCOSE INTOLERANCE (IMPAIRED GLUCOSE TOLERANCE): ICD-10-CM

## 2023-10-30 LAB
CHOLESTEROL, FASTING: 113 MG/DL
HBA1C MFR BLD: 5.5 % (ref 4–5.6)
HDLC SERPL-MCNC: 22 MG/DL
LDL CHOLESTEROL: 58 MG/DL
TRIGLYCERIDE, FASTING: 163 MG/DL
VLDLC SERPL CALC-MCNC: 33 MG/DL

## 2023-10-31 ENCOUNTER — TELEPHONE (OUTPATIENT)
Dept: SLEEP CENTER | Age: 88
End: 2023-10-31

## 2023-11-01 ENCOUNTER — HOSPITAL ENCOUNTER (OUTPATIENT)
Dept: SLEEP CENTER | Age: 88
Discharge: HOME | End: 2023-11-01
Payer: MEDICARE

## 2023-11-01 DIAGNOSIS — G47.31 COMPLEX SLEEP APNEA SYNDROME: ICD-10-CM

## 2023-11-01 PROCEDURE — 95811 POLYSOM 6/>YRS CPAP 4/> PARM: CPT

## 2023-11-06 ENCOUNTER — OFFICE VISIT (OUTPATIENT)
Dept: PRIMARY CARE CLINIC | Age: 88
End: 2023-11-06
Payer: MEDICARE

## 2023-11-06 VITALS
DIASTOLIC BLOOD PRESSURE: 60 MMHG | WEIGHT: 211 LBS | SYSTOLIC BLOOD PRESSURE: 130 MMHG | HEART RATE: 68 BPM | TEMPERATURE: 98.7 F | OXYGEN SATURATION: 98 % | HEIGHT: 68 IN | BODY MASS INDEX: 31.98 KG/M2

## 2023-11-06 DIAGNOSIS — D68.69 SECONDARY HYPERCOAGULABLE STATE (HCC): ICD-10-CM

## 2023-11-06 DIAGNOSIS — E78.2 MIXED HYPERLIPIDEMIA: ICD-10-CM

## 2023-11-06 DIAGNOSIS — R73.02 GLUCOSE INTOLERANCE (IMPAIRED GLUCOSE TOLERANCE): ICD-10-CM

## 2023-11-06 DIAGNOSIS — I48.92 ATRIAL FIBRILLATION AND FLUTTER (HCC): Primary | ICD-10-CM

## 2023-11-06 DIAGNOSIS — G47.33 OSA (OBSTRUCTIVE SLEEP APNEA): ICD-10-CM

## 2023-11-06 DIAGNOSIS — M25.812 IMPINGEMENT OF LEFT SHOULDER: ICD-10-CM

## 2023-11-06 DIAGNOSIS — I48.91 ATRIAL FIBRILLATION AND FLUTTER (HCC): Primary | ICD-10-CM

## 2023-11-06 PROCEDURE — 1123F ACP DISCUSS/DSCN MKR DOCD: CPT | Performed by: INTERNAL MEDICINE

## 2023-11-06 PROCEDURE — 99214 OFFICE O/P EST MOD 30 MIN: CPT | Performed by: INTERNAL MEDICINE

## 2023-12-06 ENCOUNTER — TELEPHONE (OUTPATIENT)
Dept: SLEEP CENTER | Age: 88
End: 2023-12-06

## 2023-12-06 DIAGNOSIS — G47.31 CENTRAL SLEEP APNEA: Primary | ICD-10-CM

## 2023-12-06 NOTE — TELEPHONE ENCOUNTER
Call to pt to discuss titration results and tx recommendation for Bipap ST. Pt agreeable. Will keep 12-13-23 VV with NP to discuss results in depth.  Orders sent to [facility] to expedite d/t severity

## 2023-12-13 ENCOUNTER — SCHEDULED TELEPHONE ENCOUNTER (OUTPATIENT)
Dept: SLEEP CENTER | Age: 88
End: 2023-12-13
Payer: MEDICARE

## 2023-12-13 DIAGNOSIS — G47.31 COMPLEX SLEEP APNEA SYNDROME: Primary | ICD-10-CM

## 2023-12-13 PROCEDURE — 99442 PR PHYS/QHP TELEPHONE EVALUATION 11-20 MIN: CPT | Performed by: NURSE PRACTITIONER

## 2023-12-13 NOTE — PROGRESS NOTES
[address] Sleep Medicine    Documentation:  I communicated with the patient and/or health care decision maker about sleep apnea. Total Time: minutes: 11-20 minutes    Rahel Mehta was evaluated through a synchronous (real-time) audio encounter. Patient identification was verified at the start of the visit. He (or guardian if applicable) is aware that this is a billable service, which includes applicable co-pays. This visit was conducted with the patient's (and/or legal guardian's) verbal consent. He has not had a related appointment within my department in the past 7 days or scheduled within the next 24 hours. The patient was located at Home: [place] [address]. The provider was located at Home ([address] [place] [address]): [place]. Note: not billable if this call serves to triage the patient into an appointment for the relevant concern    Rahel Mehta is a 80 y.o. male evaluated via telephone on 12/13/2023 for No chief complaint on file. JANUSZ Lee - CNP      Date of Visit: 12/13/23        Review of Last Visit Summary:    The patient was last seen on 10/17/2023 for  Obstructive Sleep Apnea. Interim History:     Rahel Mehta is a 80 y.o. male that  has a past medical history of Arthritis, Atrial fibrillation, chronic ([address]), and GERD (gastroesophageal reflux disease). He presents as follow up to Sleep Clinic to review sleep study results. Interval Events:      -Here to review sleep titration results from 11/1. BiPAP ST recommended due to continued central events. -Uses CPAP currently, with a nasal mask.  -Notes improvement in his sleep but continues to be tired in the day, falls asleep easily.  -Willing to proceed with BIPAP ST.     Sleep Study History: 9/28/2022- HST- wt 211 lbs, AHI 51.7, REM RDI 57, supine RDI 52, SpO2 naila 86%, T<88% was 0.3% of total O2 recording time    11/1/23 Titration study- wt 211 lbs- sleep efficiency 63%, REM 10.8%, CPAP and BIPAP were

## 2024-02-09 DIAGNOSIS — I48.92 ATRIAL FIBRILLATION AND FLUTTER (HCC): ICD-10-CM

## 2024-02-09 DIAGNOSIS — I48.91 ATRIAL FIBRILLATION AND FLUTTER (HCC): ICD-10-CM

## 2024-02-09 DIAGNOSIS — R73.02 GLUCOSE INTOLERANCE (IMPAIRED GLUCOSE TOLERANCE): ICD-10-CM

## 2024-02-09 DIAGNOSIS — E78.2 MIXED HYPERLIPIDEMIA: ICD-10-CM

## 2024-02-09 DIAGNOSIS — I48.19 PERSISTENT ATRIAL FIBRILLATION (HCC): ICD-10-CM

## 2024-02-09 DIAGNOSIS — M15.9 GENERALIZED OSTEOARTHRITIS: ICD-10-CM

## 2024-02-12 ENCOUNTER — OFFICE VISIT (OUTPATIENT)
Dept: PRIMARY CARE CLINIC | Age: 89
End: 2024-02-12
Payer: MEDICARE

## 2024-02-12 VITALS
SYSTOLIC BLOOD PRESSURE: 128 MMHG | WEIGHT: 196.2 LBS | DIASTOLIC BLOOD PRESSURE: 70 MMHG | HEART RATE: 76 BPM | BODY MASS INDEX: 29.73 KG/M2 | OXYGEN SATURATION: 98 % | TEMPERATURE: 97.1 F | HEIGHT: 68 IN

## 2024-02-12 DIAGNOSIS — G62.9 NEUROPATHY: ICD-10-CM

## 2024-02-12 DIAGNOSIS — D68.69 SECONDARY HYPERCOAGULABLE STATE (HCC): ICD-10-CM

## 2024-02-12 DIAGNOSIS — E87.5 HYPERKALEMIA: ICD-10-CM

## 2024-02-12 DIAGNOSIS — H61.20 WAX IN EAR: ICD-10-CM

## 2024-02-12 DIAGNOSIS — N13.8 BPH WITH OBSTRUCTION/LOWER URINARY TRACT SYMPTOMS: ICD-10-CM

## 2024-02-12 DIAGNOSIS — I50.42 CHRONIC COMBINED SYSTOLIC AND DIASTOLIC CONGESTIVE HEART FAILURE (HCC): ICD-10-CM

## 2024-02-12 DIAGNOSIS — I48.92 ATRIAL FIBRILLATION AND FLUTTER (HCC): ICD-10-CM

## 2024-02-12 DIAGNOSIS — N40.1 BPH WITH OBSTRUCTION/LOWER URINARY TRACT SYMPTOMS: ICD-10-CM

## 2024-02-12 DIAGNOSIS — E78.2 MIXED HYPERLIPIDEMIA: ICD-10-CM

## 2024-02-12 DIAGNOSIS — I48.91 ATRIAL FIBRILLATION AND FLUTTER (HCC): ICD-10-CM

## 2024-02-12 DIAGNOSIS — Z00.00 MEDICARE ANNUAL WELLNESS VISIT, SUBSEQUENT: Primary | ICD-10-CM

## 2024-02-12 LAB
ABSOLUTE IMMATURE GRANULOCYTE: 0.11 K/UL (ref 0–0.58)
ALBUMIN SERPL-MCNC: 3.7 G/DL (ref 3.5–5.2)
ALP BLD-CCNC: 91 U/L (ref 40–129)
ALT SERPL-CCNC: 18 U/L (ref 0–40)
ANION GAP SERPL CALCULATED.3IONS-SCNC: 15 MMOL/L (ref 7–16)
AST SERPL-CCNC: 30 U/L (ref 0–39)
BASOPHILS ABSOLUTE: 0.09 K/UL (ref 0–0.2)
BASOPHILS RELATIVE PERCENT: 1 % (ref 0–2)
BILIRUB SERPL-MCNC: 0.7 MG/DL (ref 0–1.2)
BUN BLDV-MCNC: 22 MG/DL (ref 6–23)
CALCIUM SERPL-MCNC: 9.5 MG/DL (ref 8.6–10.2)
CHLORIDE BLD-SCNC: 106 MMOL/L (ref 98–107)
CHOLESTEROL, FASTING: 124 MG/DL
CO2: 24 MMOL/L (ref 22–29)
CREAT SERPL-MCNC: 0.7 MG/DL (ref 0.7–1.2)
EOSINOPHILS ABSOLUTE: 0.21 K/UL (ref 0.05–0.5)
EOSINOPHILS RELATIVE PERCENT: 2 % (ref 0–6)
GFR SERPL CREATININE-BSD FRML MDRD: >60 ML/MIN/1.73M2
GLUCOSE BLD-MCNC: 102 MG/DL (ref 74–99)
HBA1C MFR BLD: 5.8 % (ref 4–5.6)
HDLC SERPL-MCNC: 30 MG/DL
HEMOGLOBIN: 13.2 G/DL (ref 12.5–16.5)
IMMATURE GRANULOCYTES: 1 % (ref 0–5)
LDL CHOLESTEROL: 62 MG/DL
LYMPHOCYTES ABSOLUTE: 1.94 K/UL (ref 1.5–4)
LYMPHOCYTES RELATIVE PERCENT: 18 % (ref 20–42)
MCH RBC QN AUTO: 29.1 PG (ref 26–35)
MCHC RBC AUTO-ENTMCNC: 30.1 G/DL (ref 32–34.5)
MCV RBC AUTO: 96.9 FL (ref 80–99.9)
MONOCYTES ABSOLUTE: 1.42 K/UL (ref 0.1–0.95)
MONOCYTES RELATIVE PERCENT: 13 % (ref 2–12)
NEUTROPHILS ABSOLUTE: 7.12 K/UL (ref 1.8–7.3)
NEUTROPHILS RELATIVE PERCENT: 66 % (ref 43–80)
PDW BLD-RTO: 17.8 % (ref 11.5–15)
PLATELET, FLUORESCENCE: 171 K/UL (ref 130–450)
PMV BLD AUTO: 13.4 FL (ref 7–12)
POTASSIUM SERPL-SCNC: 5.5 MMOL/L (ref 3.5–5)
RBC # BLD: 4.53 M/UL (ref 3.8–5.8)
SODIUM BLD-SCNC: 145 MMOL/L (ref 132–146)
TOTAL PROTEIN: 6.9 G/DL (ref 6.4–8.3)
TRIGLYCERIDE, FASTING: 162 MG/DL
TSH SERPL DL<=0.05 MIU/L-ACNC: 3.75 UIU/ML (ref 0.27–4.2)
VLDLC SERPL CALC-MCNC: 32 MG/DL
WBC # BLD: 10.9 K/UL (ref 4.5–11.5)

## 2024-02-12 PROCEDURE — G0439 PPPS, SUBSEQ VISIT: HCPCS | Performed by: INTERNAL MEDICINE

## 2024-02-12 PROCEDURE — 1123F ACP DISCUSS/DSCN MKR DOCD: CPT | Performed by: INTERNAL MEDICINE

## 2024-02-12 PROCEDURE — 99214 OFFICE O/P EST MOD 30 MIN: CPT | Performed by: INTERNAL MEDICINE

## 2024-02-12 RX ORDER — PREGABALIN 25 MG/1
25 CAPSULE ORAL 3 TIMES DAILY
Qty: 60 CAPSULE | Refills: 3 | Status: SHIPPED | OUTPATIENT
Start: 2024-02-12 | End: 2024-05-12

## 2024-02-12 RX ORDER — TAMSULOSIN HYDROCHLORIDE 0.4 MG/1
0.4 CAPSULE ORAL NIGHTLY
Qty: 90 CAPSULE | Refills: 1 | Status: SHIPPED | OUTPATIENT
Start: 2024-02-12

## 2024-02-12 NOTE — PROGRESS NOTES
Medicare Annual Wellness Visit    Deyvi Rose is here for Medicare AWV    Assessment & Plan   Medicare annual wellness visit, subsequent  Atrial fibrillation and flutter (HCC)  -     apixaban (ELIQUIS) 5 MG TABS tablet; Take 1 tablet by mouth 2 times daily, Disp-180 tablet, R-1Normal  BPH with obstruction/lower urinary tract symptoms  -     tamsulosin (FLOMAX) 0.4 MG capsule; Take 1 capsule by mouth at bedtime, Disp-90 capsule, R-1Normal  Mixed hyperlipidemia  Acute on chronic combined systolic and diastolic CHF (congestive heart failure) (HCC)  Opioid dependence with current use (HCC)  Secondary hypercoagulable state (HCC)    Recommendations for Preventive Services Due: see orders and patient instructions/AVS.  Recommended screening schedule for the next 5-10 years is provided to the patient in written form: see Patient Instructions/AVS.     Return for Medicare Annual Wellness Visit in 1 year.     Subjective   The following acute and/or chronic problems were also addressed today:  HTN A Fib     Patient's complete Health Risk Assessment and screening values have been reviewed and are found in Flowsheets. The following problems were reviewed today and where indicated follow up appointments were made and/or referrals ordered.    Positive Risk Factor Screenings with Interventions:    Fall Risk:  Do you feel unsteady or are you worried about falling? : (!) yes  2 or more falls in past year?: (!) yes  Fall with injury in past year?: no     Interventions:    Reviewed medications, home hazards, visual acuity, and co-morbidities that can increase risk for falls  Has home PT   he had history of recurrent falls in the past month he fell twice and he is on a blood thinner so we will consider Watchman device    Cognitive:      Words recalled: 2 Words Recalled     Total Score Interpretation: Abnormal Mini-Cog  Interventions:  His wife is the person who takes care of of the checkbook and she stated that he sometimes forgets but

## 2024-02-12 NOTE — PROGRESS NOTES
Deyvi Rose (:  1934) is a 89 y.o. male,Established patient, here for evaluation of the following chief complaint(s):  Medicare AWV      Subjective   SUBJECTIVE/OBJECTIVE:  HPI  Hyperlipidemia:  Patient is here to follow up regarding chronic hyperlipidemia.  This is  generally controlled.  Treatment includes simvastatin.  Patient is  compliant with lifestyle modifications.  Patient is not a smoker.  Most recent labs reviewed with patient today and are not remarkable.  Comorbid conditions include obesity.   Arthuritis  Patient here for evaluation of chronic arthritis has A-fib and currently on anticoagulants patient cannot take nonsteroidal antiprotozoal medications patient using tramadol helping his symptoms is compliant with it and reviewing the ORAS showed that the patient does not with medications for anybody else  A Fib  Heart rate slow patient has been seeing electrophysiologist started on amiodarone patient has no evidence of stroke or mini stroke.  On exam today he is a little bit tired.  He reported also that he had episodes of sudden loss of consciousness resulting in falls    DAT  Doing well compliant with treatment using BIPAB  Review of Systems   Constitutional: Negative.  Negative for activity change, appetite change, chills, fatigue and fever.   HENT: Negative.  Negative for congestion, ear pain, facial swelling, hearing loss, postnasal drip, rhinorrhea and sinus pain.    Eyes: Negative.  Negative for pain, discharge, redness and itching.   Respiratory: Negative.     Cardiovascular: Negative.    Gastrointestinal: Negative.    Endocrine: Negative.    Genitourinary: Negative.    Musculoskeletal: Negative.    Skin: Negative.    Allergic/Immunologic: Negative.    Neurological: Negative.    Hematological: Negative.    Psychiatric/Behavioral: Negative.       CMP:    Lab Results   Component Value Date/Time     2024 08:12 AM    K 5.5 2024 08:12 AM     2024 08:12 AM

## 2024-02-12 NOTE — PATIENT INSTRUCTIONS
911, the  may tell you to chew 1 adult-strength or 2 to 4 low-dose aspirin. Wait for an ambulance. Do not try to drive yourself.  Watch closely for changes in your health, and be sure to contact your doctor if you have any problems.  Where can you learn more?  Go to https://www.[employer].net/patientEd and enter F075 to learn more about \"A Healthy Heart: Care Instructions.\"  Current as of: June 25, 2023               Content Version: 13.9  © 7072-4558 [employer].   Care instructions adapted under license by [employer]. If you have questions about a medical condition or this instruction, always ask your healthcare professional. [employer] disclaims any warranty or liability for your use of this information.      Personalized Preventive Plan for Deyvi Rose - 2/12/2024  Medicare offers a range of preventive health benefits. Some of the tests and screenings are paid in full while other may be subject to a deductible, co-insurance, and/or copay.    Some of these benefits include a comprehensive review of your medical history including lifestyle, illnesses that may run in your family, and various assessments and screenings as appropriate.    After reviewing your medical record and screening and assessments performed today your provider may have ordered immunizations, labs, imaging, and/or referrals for you.  A list of these orders (if applicable) as well as your Preventive Care list are included within your After Visit Summary for your review.    Other Preventive Recommendations:    A preventive eye exam performed by an eye specialist is recommended every 1-2 years to screen for glaucoma; cataracts, macular degeneration, and other eye disorders.  A preventive dental visit is recommended every 6 months.  Try to get at least 150 minutes of exercise per week or 10,000 steps per day on a pedometer .  Order or download the FREE \"Exercise & Physical Activity: Your Everyday Guide\" from

## 2024-02-14 ENCOUNTER — TELEPHONE (OUTPATIENT)
Dept: ADMINISTRATIVE | Age: 89
End: 2024-02-14

## 2024-02-15 DIAGNOSIS — E87.5 HYPERKALEMIA: ICD-10-CM

## 2024-02-15 LAB — POTASSIUM SERPL-SCNC: 4.3 MMOL/L (ref 3.5–5)

## 2024-02-19 ENCOUNTER — OFFICE VISIT (OUTPATIENT)
Dept: SLEEP CENTER | Age: 89
End: 2024-02-19
Payer: MEDICARE

## 2024-02-19 VITALS
DIASTOLIC BLOOD PRESSURE: 71 MMHG | HEART RATE: 92 BPM | BODY MASS INDEX: 30.8 KG/M2 | SYSTOLIC BLOOD PRESSURE: 120 MMHG | OXYGEN SATURATION: 92 % | RESPIRATION RATE: 20 BRPM | HEIGHT: 67 IN | WEIGHT: 196.21 LBS

## 2024-02-19 DIAGNOSIS — G47.31 COMPLEX SLEEP APNEA SYNDROME: Primary | ICD-10-CM

## 2024-02-19 PROCEDURE — 99214 OFFICE O/P EST MOD 30 MIN: CPT | Performed by: NURSE PRACTITIONER

## 2024-02-19 PROCEDURE — 1123F ACP DISCUSS/DSCN MKR DOCD: CPT | Performed by: NURSE PRACTITIONER

## 2024-02-19 ASSESSMENT — SLEEP AND FATIGUE QUESTIONNAIRES
HOW LIKELY ARE YOU TO NOD OFF OR FALL ASLEEP WHILE SITTING INACTIVE IN A PUBLIC PLACE: 0
HOW LIKELY ARE YOU TO NOD OFF OR FALL ASLEEP WHILE SITTING AND READING: 3
HOW LIKELY ARE YOU TO NOD OFF OR FALL ASLEEP WHILE WATCHING TV: 3
HOW LIKELY ARE YOU TO NOD OFF OR FALL ASLEEP IN A CAR, WHILE STOPPED FOR A FEW MINUTES IN TRAFFIC: 0
ESS TOTAL SCORE: 16
HOW LIKELY ARE YOU TO NOD OFF OR FALL ASLEEP WHILE SITTING QUIETLY AFTER LUNCH WITHOUT ALCOHOL: 3
HOW LIKELY ARE YOU TO NOD OFF OR FALL ASLEEP WHILE SITTING AND TALKING TO SOMEONE: 1
HOW LIKELY ARE YOU TO NOD OFF OR FALL ASLEEP WHILE LYING DOWN TO REST IN THE AFTERNOON WHEN CIRCUMSTANCES PERMIT: 3
HOW LIKELY ARE YOU TO NOD OFF OR FALL ASLEEP WHEN YOU ARE A PASSENGER IN A CAR FOR AN HOUR WITHOUT A BREAK: 3

## 2024-02-19 NOTE — PROGRESS NOTES
[facility] Sleep Medicine    Patient Name: Deyvi Rose  Age: 89 y.o.   : 1934    Date of Visit: 24        Review of Last Visit Summary:    The patient was last seen on 2023 for  Obstructive Sleep Apnea.    Interim History:     Deyvi Rose is a 89 y.o. male that  has a past medical history of Arthritis, Atrial fibrillation, chronic (HCC), and GERD (gastroesophageal reflux disease). He presents with his wife, in follow up to Sleep Clinic to review BiPAP ST adherence and efficacy.     Interval Events:    -Received BIPAP ST in  following titration sleep study.  -He uses a nasal mask--- rarely with a chin strap. Wife admits he does sleep with his mouth open.  -Sleeps supine. Has an adjustable bed.  -Continues to have fragmented sleep and daytime fatigue. Is wearing BIPAP ST nightly. No benefit.  -Tolerating pressure well.    DME: [facility]    Sleep Study History: 2022- HST- wt 211 lbs, AHI 51.7, REM RDI 57, supine RDI 52, SpO2 naila 86%, T<88% was 0.3% of total O2 recording time    23 Titration study- wt 211 lbs- sleep efficiency 63%, REM 10.8%, CPAP and BIPAP were initiated but due to continued central events, BIPAP ST used. BIPAP 17/12 w a BUR of 12 BPM is recommended.    Sleep History:   He was diagnosed in 2022 through a home sleep study that showed severe DAT with an AHI of 51.7, with stable oxygenation.  This test was ordered because he was experiencing excessive fatigue during the day and had a history of atrial fibrillation with several cardioversions this year.     He was ordered an AUTO CPAP and received a RESVENT device through [facility], which he has been using on a nightly basis. Despite regular usage, symptoms of fatigue, and unintentional napping persist.  He states he does wake up feeling refreshed, but his wife states that if he sits down for longer than 5 minutes he will be asleep.     He does not himself to drive out of fear that he will fall asleep behind the

## 2024-02-21 ENCOUNTER — TELEPHONE (OUTPATIENT)
Dept: PRIMARY CARE CLINIC | Age: 89
End: 2024-02-21

## 2024-02-21 NOTE — TELEPHONE ENCOUNTER
Wife called in stating the pregablin needs a prior auth done are you able to do this in cover mymeds?

## 2024-02-26 NOTE — PROGRESS NOTES
Reviewed data from BIPAP ST, it appears that breathing events correlate with high mask leak. Spoke with patient's wife, he is tolerating increased pressure. Discussed switching masks, will check  data download in 1 week.

## 2024-03-04 NOTE — PROGRESS NOTES
Patient is doing much better with hybrid mask and setting change. He is also now using mask liners. Wife states he is sleeping deeper, remembering his dreams, and not napping through the day in the last 1-2 weeks. Residual AHI 7 (previously in the 30s). Continue current BIPAP ST settings.

## 2024-03-07 ENCOUNTER — OFFICE VISIT (OUTPATIENT)
Dept: CARDIOLOGY CLINIC | Age: 89
End: 2024-03-07
Payer: MEDICARE

## 2024-03-07 VITALS
RESPIRATION RATE: 18 BRPM | HEIGHT: 67 IN | WEIGHT: 202 LBS | DIASTOLIC BLOOD PRESSURE: 66 MMHG | SYSTOLIC BLOOD PRESSURE: 126 MMHG | BODY MASS INDEX: 31.71 KG/M2 | HEART RATE: 103 BPM

## 2024-03-07 DIAGNOSIS — Z91.89 AT HIGH RISK FOR BLEEDING: ICD-10-CM

## 2024-03-07 DIAGNOSIS — I48.92 ATRIAL FIBRILLATION AND FLUTTER (HCC): Primary | ICD-10-CM

## 2024-03-07 DIAGNOSIS — I48.19 PERSISTENT ATRIAL FIBRILLATION (HCC): Primary | ICD-10-CM

## 2024-03-07 DIAGNOSIS — R29.6 FALLS FREQUENTLY: ICD-10-CM

## 2024-03-07 DIAGNOSIS — I48.91 ATRIAL FIBRILLATION AND FLUTTER (HCC): Primary | ICD-10-CM

## 2024-03-07 DIAGNOSIS — R29.6 REPEATED FALLS: ICD-10-CM

## 2024-03-07 PROCEDURE — 1123F ACP DISCUSS/DSCN MKR DOCD: CPT | Performed by: INTERNAL MEDICINE

## 2024-03-07 PROCEDURE — 93000 ELECTROCARDIOGRAM COMPLETE: CPT | Performed by: INTERNAL MEDICINE

## 2024-03-07 PROCEDURE — 99205 OFFICE O/P NEW HI 60 MIN: CPT | Performed by: INTERNAL MEDICINE

## 2024-03-07 ASSESSMENT — ENCOUNTER SYMPTOMS
NAUSEA: 0
VOMITING: 0
BACK PAIN: 0
SHORTNESS OF BREATH: 0
COUGH: 0
BLOOD IN STOOL: 0
ABDOMINAL PAIN: 0
CHEST TIGHTNESS: 0

## 2024-03-07 NOTE — PROGRESS NOTES
OFFICE VISIT    NAME: Deyvi Rose     YOB: 1934   AGE: 89 y.o.   MEDICAL RECORD NUMBER: [id number]       CONSULTATION INFORMATION:   DATE OF CLINIC CONSULTATION: 3/7/2024   PRINCIPLE DIAGNOSIS / reason for visit: A.[facility]    CARE TEAM MEMBERS    PCP : Heather Ascencio MD     PRIMARY CARDIOLOGIST: Dr. Laughlin   REFERRING PROVIDER: Heather Ascencio MD     HISTORY OF PRESENT ILLNESS:   Deyvi Rose is a 89 y.o. male referred by Heather Alejandro for evaluation of atrial fibrillation and consideration of LAAO. Past medical history of HTN, HLD, sleep apnea, obesity, persistent atrial fibrillation (failed multiple DCCV attempts, even with Amiodarone), frequent falls, HFpEF and BPH      Patient reports that he has been having frequent falls, at least twice a month.  He denies having dizziness or syncope.  No balance issues.  He is unsure why he falls.  He just finds himself on the ground.  Patient reports that he had a fall about 5 years ago when he hit his head requiring stitches of his forehead.  He reports easy bleeding and he has significant wound over his left elbow from a bleed that happened after a fall.  He also reports that he gets scratches over his skin from his playful dog and that causes him to bleed.  Denies having GI bleed. Denies having CP, SOB.    -TTE 3/23/2023:  Summary   Normal left ventricular chamber size.   Normal left ventricular systolic function, EF 70%.   Mild left ventricular concentric hypertrophy noted.   Indeterminate diastolic function.   Left atrium is enlarged, 4.5cm.   Interatrial septum appears intact.   Normal right ventricle size and function.   Trace mitral regurgitation.   No mitral valve prolapse.   No significant aortic stenosis.   Trace aortic regurgitation, pressure 1/2 time 668ms.   Mild tricuspid regurgitation, RVSP 25mmg.   Normal aortic root size.   No evidence of pericardial effusion.   No intra cardiac mass or thrombus.   No previous echo for

## 2024-03-07 NOTE — PROGRESS NOTES
14 day Zio XT  Monitor was placed per Dr Bates.  Serial number [id number].  Instructions were given & patient verbalized understanding.

## 2024-03-08 ENCOUNTER — TELEPHONE (OUTPATIENT)
Dept: CARDIOLOGY CLINIC | Age: 89
End: 2024-03-08

## 2024-03-08 NOTE — TELEPHONE ENCOUNTER
MARY CTA INSTRUCTIONS:    I called & spoke to Deyvi's wife Debbie to give her Deyvi's Watchman CTA Instructions   Location: [facility]  Date & Time: 3/25/24 at 10:30 am  NPO after: 6:00 am  Arrival Time: 8:00 am to get lab work before CTA  More detailed instructions mailed to pt

## 2024-03-11 ENCOUNTER — TELEPHONE (OUTPATIENT)
Dept: PRIMARY CARE CLINIC | Age: 89
End: 2024-03-11

## 2024-03-11 NOTE — TELEPHONE ENCOUNTER
Lyrica is not helping his neuropathy at all and wanted to know if there was something else he can be started on

## 2024-03-12 ENCOUNTER — OFFICE VISIT (OUTPATIENT)
Dept: PRIMARY CARE CLINIC | Age: 89
End: 2024-03-12

## 2024-03-12 VITALS
BODY MASS INDEX: 32.02 KG/M2 | DIASTOLIC BLOOD PRESSURE: 66 MMHG | WEIGHT: 204 LBS | OXYGEN SATURATION: 98 % | HEIGHT: 67 IN | SYSTOLIC BLOOD PRESSURE: 122 MMHG | TEMPERATURE: 97.4 F | HEART RATE: 68 BPM

## 2024-03-12 DIAGNOSIS — Z71.89 ACP (ADVANCE CARE PLANNING): ICD-10-CM

## 2024-03-12 DIAGNOSIS — I50.43 ACUTE ON CHRONIC COMBINED SYSTOLIC AND DIASTOLIC CHF (CONGESTIVE HEART FAILURE) (HCC): ICD-10-CM

## 2024-03-12 DIAGNOSIS — G62.9 NEUROPATHY: Primary | ICD-10-CM

## 2024-03-12 DIAGNOSIS — Z91.030 ALLERGY TO BEE STING: ICD-10-CM

## 2024-03-12 RX ORDER — EPINEPHRINE 0.3 MG/.3ML
0.3 INJECTION SUBCUTANEOUS ONCE
Qty: 0.3 ML | Refills: 0 | Status: SHIPPED | OUTPATIENT
Start: 2024-03-12 | End: 2024-03-12

## 2024-03-12 RX ORDER — CARBAMAZEPINE 200 MG
100 TABLET ORAL NIGHTLY
Qty: 90 TABLET | Refills: 3
Start: 2024-03-12 | End: 2024-03-13

## 2024-03-12 RX ORDER — FUROSEMIDE 40 MG/1
40 TABLET ORAL DAILY
Qty: 45 TABLET | Refills: 1 | Status: SHIPPED | OUTPATIENT
Start: 2024-03-12

## 2024-03-12 NOTE — PROGRESS NOTES
Advance Care Planning   Discussed the patient’s choices for care and treatment preferences in case of a health event that adversely affects decision-making abilities or is life-limiting. Recommended the patient document care preferences in state-specific advance directives. Also reviewed the process of designating a trusted capable adult as an Agent (or Health Care Power of ) to make health care decisions for the patient if the patient becomes unable due to incapacity. Patient was asked to complete advance directive forms, if not already done, and to provide a dated, signed and witnessed (or notarized) copy, per the forms' requirements, to the practice office.    Time spent (minutes): <16 minutes (Non-Billable)     
Vital Sign     Unable to Pay for Housing in the Last Year: Not on file     Number of Places Lived in the Last Year: Not on file     Unstable Housing in the Last Year: No      Health Maintenance Due   Topic Date Due    DTaP/Tdap/Td vaccine (1 - Tdap) Never done    Respiratory Syncytial Virus (RSV) Pregnant or age 60 yrs+ (1 - 1-dose 60+ series) Never done    COVID-19 Vaccine (6 - 2023-24 season) 09/01/2023    lski    Physical Exam  Constitutional:       General: He is not in acute distress.     Appearance: Normal appearance. He is normal weight.   HENT:      Head: Normocephalic and atraumatic.      Right Ear: Tympanic membrane, ear canal and external ear normal.      Left Ear: Tympanic membrane, ear canal and external ear normal. There is no impacted cerumen.      Nose: Nose normal. No congestion or rhinorrhea.      Mouth/Throat:      Mouth: Mucous membranes are moist.      Pharynx: Oropharynx is clear. No oropharyngeal exudate or posterior oropharyngeal erythema.   Eyes:      General: No scleral icterus.        Right eye: No discharge.         Left eye: No discharge.      Pupils: Pupils are equal, round, and reactive to light.   Neck:      Vascular: No carotid bruit.   Cardiovascular:      Rate and Rhythm: Normal rate and regular rhythm.      Pulses: Normal pulses.      Heart sounds: No murmur heard.     No gallop.   Pulmonary:      Effort: Pulmonary effort is normal. No respiratory distress.      Breath sounds: Normal breath sounds. No wheezing, rhonchi or rales.   Chest:      Chest wall: No tenderness.   Abdominal:      General: Bowel sounds are normal.      Palpations: Abdomen is soft. There is no mass.      Tenderness: There is no abdominal tenderness. There is no guarding.      Hernia: No hernia is present.   Musculoskeletal:         General: No swelling, tenderness, deformity or signs of injury.      Cervical back: Normal range of motion and neck supple. No rigidity or tenderness.      Right lower leg: No

## 2024-03-12 NOTE — PATIENT INSTRUCTIONS

## 2024-03-13 ENCOUNTER — TELEPHONE (OUTPATIENT)
Dept: CARDIOLOGY CLINIC | Age: 89
End: 2024-03-13

## 2024-03-13 DIAGNOSIS — G62.9 NEUROPATHY: ICD-10-CM

## 2024-03-13 RX ORDER — CARBAMAZEPINE 200 MG
100 TABLET ORAL NIGHTLY
Qty: 90 TABLET | Refills: 3 | Status: SHIPPED
Start: 2024-03-13 | End: 2024-03-14

## 2024-03-13 NOTE — TELEPHONE ENCOUNTER
WATCHMAN CTA INSURANCE AUTHORIZATION    Insurance Company:  [employer]     Watchman CTA CPT: 00585    Requesting Physician:   Dr Bates                                                                                                                 NPI: [id number]                                                                                                    Tax ID: [id number]        Authorization: Approved                           Approval Dates: 3/8/24 - 9/8/24                           Auth#:  [id number]

## 2024-03-14 ENCOUNTER — TELEPHONE (OUTPATIENT)
Dept: PRIMARY CARE CLINIC | Age: 89
End: 2024-03-14

## 2024-03-14 ENCOUNTER — TELEPHONE (OUTPATIENT)
Dept: CARDIOLOGY CLINIC | Age: 89
End: 2024-03-14

## 2024-03-14 DIAGNOSIS — G62.9 NEUROPATHY: Primary | ICD-10-CM

## 2024-03-14 RX ORDER — PREGABALIN 50 MG/1
50 CAPSULE ORAL 3 TIMES DAILY
Qty: 90 CAPSULE | Refills: 3 | Status: SHIPPED | OUTPATIENT
Start: 2024-03-14 | End: 2024-07-12

## 2024-03-14 NOTE — TELEPHONE ENCOUNTER
Pharmacy calling with contraindications of tegretol with Eliquis so they won't fill until the hear from the office.

## 2024-03-14 NOTE — TELEPHONE ENCOUNTER
Cancel  Tegretol and increase his Lyrica to 50 mg p.o. 3 times daily as Tegretol can cause problems with Xarelto  Once he has the heart issue resolved then we can put him back on the Tegretol Opt out

## 2024-03-14 NOTE — TELEPHONE ENCOUNTER
Wife called patient was having trouble with the monitor with no resolution.     Called  Spoke to Karla DORADO a replacement will be sent out to the patient and received within 48 hours

## 2024-03-18 ENCOUNTER — TELEPHONE (OUTPATIENT)
Dept: CARDIOLOGY CLINIC | Age: 89
End: 2024-03-18

## 2024-03-18 NOTE — TELEPHONE ENCOUNTER
WATCHMAN CTA INSTRUCTIONS:    I called Kenyetta to notify her that I moved Deyvi's Watchman CTA to 4/2/24   Location: [facility]  Date & Time: 4/2/24 at 9:30 am  NPO after: 5:30 am   Arrival Time: 7:00 am to get labs drawn  More detailed instructions emailed to [email or web address]     I also notified Debbie that I scheduled Deyvi for his Watchman on 4/8/24 at 7:30 am

## 2024-03-25 DIAGNOSIS — R29.6 FALLS FREQUENTLY: ICD-10-CM

## 2024-04-02 ENCOUNTER — HOSPITAL ENCOUNTER (OUTPATIENT)
Dept: CT IMAGING | Age: 89
Discharge: HOME | End: 2024-04-04
Attending: INTERNAL MEDICINE
Payer: MEDICARE

## 2024-04-02 ENCOUNTER — OFFICE VISIT (OUTPATIENT)
Dept: SLEEP CENTER | Age: 89
End: 2024-04-02
Payer: MEDICARE

## 2024-04-02 DIAGNOSIS — G47.31 COMPLEX SLEEP APNEA SYNDROME: Primary | ICD-10-CM

## 2024-04-02 DIAGNOSIS — I48.19 PERSISTENT ATRIAL FIBRILLATION (HCC): ICD-10-CM

## 2024-04-02 DIAGNOSIS — I48.91 ATRIAL FIBRILLATION, UNSPECIFIED TYPE (HCC): ICD-10-CM

## 2024-04-02 DIAGNOSIS — Z91.89 AT HIGH RISK FOR BLEEDING: ICD-10-CM

## 2024-04-02 DIAGNOSIS — R29.6 REPEATED FALLS: ICD-10-CM

## 2024-04-02 PROCEDURE — 1123F ACP DISCUSS/DSCN MKR DOCD: CPT | Performed by: NURSE PRACTITIONER

## 2024-04-02 PROCEDURE — 6360000004 HC RX CONTRAST MEDICATION: Performed by: RADIOLOGY

## 2024-04-02 PROCEDURE — 99214 OFFICE O/P EST MOD 30 MIN: CPT | Performed by: NURSE PRACTITIONER

## 2024-04-02 PROCEDURE — 2580000003 HC RX 258: Performed by: INTERNAL MEDICINE

## 2024-04-02 PROCEDURE — 75572 CT HRT W/3D IMAGE: CPT

## 2024-04-02 RX ORDER — SODIUM CHLORIDE 9 MG/ML
INJECTION, SOLUTION INTRAVENOUS ONCE
Status: DISCONTINUED | OUTPATIENT
Start: 2024-04-02 | End: 2024-04-02

## 2024-04-02 RX ADMIN — SODIUM CHLORIDE: 9 INJECTION, SOLUTION INTRAVENOUS at 09:00

## 2024-04-02 RX ADMIN — IOPAMIDOL 80 ML: 755 INJECTION, SOLUTION INTRAVENOUS at 10:01

## 2024-04-02 ASSESSMENT — SLEEP AND FATIGUE QUESTIONNAIRES
HOW LIKELY ARE YOU TO NOD OFF OR FALL ASLEEP WHEN YOU ARE A PASSENGER IN A CAR FOR AN HOUR WITHOUT A BREAK: HIGH CHANCE OF DOZING
HOW LIKELY ARE YOU TO NOD OFF OR FALL ASLEEP WHILE LYING DOWN TO REST IN THE AFTERNOON WHEN CIRCUMSTANCES PERMIT: HIGH CHANCE OF DOZING
HOW LIKELY ARE YOU TO NOD OFF OR FALL ASLEEP WHILE SITTING INACTIVE IN A PUBLIC PLACE: WOULD NEVER DOZE
HOW LIKELY ARE YOU TO NOD OFF OR FALL ASLEEP WHILE WATCHING TV: HIGH CHANCE OF DOZING
HOW LIKELY ARE YOU TO NOD OFF OR FALL ASLEEP WHILE SITTING AND READING: SLIGHT CHANCE OF DOZING
HOW LIKELY ARE YOU TO NOD OFF OR FALL ASLEEP WHILE SITTING QUIETLY AFTER LUNCH WITHOUT ALCOHOL: HIGH CHANCE OF DOZING
ESS TOTAL SCORE: 13
HOW LIKELY ARE YOU TO NOD OFF OR FALL ASLEEP IN A CAR, WHILE STOPPED FOR A FEW MINUTES IN TRAFFIC: WOULD NEVER DOZE
HOW LIKELY ARE YOU TO NOD OFF OR FALL ASLEEP WHILE SITTING AND TALKING TO SOMEONE: WOULD NEVER DOZE

## 2024-04-02 NOTE — PROGRESS NOTES
[facility] Sleep Medicine    Patient Name: Deyvi Rose  Age: 89 y.o.   : 1934    Date of Visit: 24        Review of Last Visit Summary:    The patient was last seen on 2024 for Sleep Apnea.    Interim History:     Deyvi Rose is a 89 y.o. male that  has a past medical history of Arthritis, Atrial fibrillation, chronic (HCC), GERD (gastroesophageal reflux disease), and HTN (hypertension). He presents in follow up to Sleep Clinic, with his wife, to review BiPAP ST adherence and efficacy.     Interval Events:    -Was previously doing well on BIPAP ST with new hybrid mask, not over the course of the last few weeks, breathing events have increased back up into the 30s. He continues to sleep elevated, on his back.  -Mask leak is excessive.   -Despite this, he is sleeping decently and wife notes improvement in his daytime energy.   -Scheduled to have Watchman device implanted next month.    DME: [facility]    Sleep Study History: 2022- HST- wt 211 lbs, AHI 51.7, REM RDI 57, supine RDI 52, SpO2 naila 86%, T<88% was 0.3% of total O2 recording time    23 Titration study- wt 211 lbs- sleep efficiency 63%, REM 10.8%, CPAP and BIPAP were initiated but due to continued central events, BIPAP ST used. BIPAP 17/12 w a BUR of 12 BPM is recommended.    Sleep History:   He was diagnosed in 2022 through a home sleep study that showed severe DAT with an AHI of 51.7, with stable oxygenation.  This test was ordered because he was experiencing excessive fatigue during the day and had a history of atrial fibrillation with several cardioversions this year.     He was ordered an AUTO CPAP and received a RESVENT device through [employer], which he has been using on a nightly basis. Despite regular usage, symptoms of fatigue, and unintentional napping persist.  He states he does wake up feeling refreshed, but his wife states that if he sits down for longer than 5 minutes he will be asleep.     He does not

## 2024-04-03 ENCOUNTER — TELEPHONE (OUTPATIENT)
Dept: SLEEP CENTER | Age: 89
End: 2024-04-03

## 2024-04-03 DIAGNOSIS — G47.33 OBSTRUCTIVE SLEEP APNEA: Primary | ICD-10-CM

## 2024-04-09 ENCOUNTER — APPOINTMENT (OUTPATIENT)
Dept: ULTRASOUND IMAGING | Age: 89
DRG: 603 | End: 2024-04-09
Payer: MEDICARE

## 2024-04-09 ENCOUNTER — HOSPITAL ENCOUNTER (INPATIENT)
Age: 89
LOS: 2 days | Discharge: HOME | DRG: 603 | End: 2024-04-11
Attending: EMERGENCY MEDICINE | Admitting: INTERNAL MEDICINE
Payer: MEDICARE

## 2024-04-09 DIAGNOSIS — L03.115 CELLULITIS OF RIGHT LOWER EXTREMITY: Primary | ICD-10-CM

## 2024-04-09 PROBLEM — L03.90 CELLULITIS: Status: ACTIVE | Noted: 2024-04-09

## 2024-04-09 LAB
ANION GAP SERPL CALCULATED.3IONS-SCNC: 12 MMOL/L (ref 7–16)
BASOPHILS # BLD: 0.03 K/UL (ref 0–0.2)
BASOPHILS NFR BLD: 0 % (ref 0–2)
BUN SERPL-MCNC: 25 MG/DL (ref 6–23)
CALCIUM SERPL-MCNC: 9 MG/DL (ref 8.6–10.2)
CHLORIDE SERPL-SCNC: 106 MMOL/L (ref 98–107)
CO2 SERPL-SCNC: 23 MMOL/L (ref 22–29)
CREAT SERPL-MCNC: 0.7 MG/DL (ref 0.7–1.2)
EOSINOPHIL # BLD: 0.08 K/UL (ref 0.05–0.5)
EOSINOPHILS RELATIVE PERCENT: 1 % (ref 0–6)
ERYTHROCYTE [DISTWIDTH] IN BLOOD BY AUTOMATED COUNT: 14.6 % (ref 11.5–15)
GFR SERPL CREATININE-BSD FRML MDRD: 87 ML/MIN/1.73M2
GLUCOSE SERPL-MCNC: 127 MG/DL (ref 74–99)
HCT VFR BLD AUTO: 41.5 % (ref 37–54)
HGB BLD-MCNC: 13.3 G/DL (ref 12.5–16.5)
IMM GRANULOCYTES # BLD AUTO: 0.04 K/UL (ref 0–0.58)
IMM GRANULOCYTES NFR BLD: 0 % (ref 0–5)
LACTATE BLDV-SCNC: 2.3 MMOL/L (ref 0.5–1.9)
LACTATE BLDV-SCNC: 2.4 MMOL/L (ref 0.5–1.9)
LYMPHOCYTES NFR BLD: 1.1 K/UL (ref 1.5–4)
LYMPHOCYTES RELATIVE PERCENT: 12 % (ref 20–42)
MCH RBC QN AUTO: 30.4 PG (ref 26–35)
MCHC RBC AUTO-ENTMCNC: 32 G/DL (ref 32–34.5)
MCV RBC AUTO: 95 FL (ref 80–99.9)
MONOCYTES NFR BLD: 0.98 K/UL (ref 0.1–0.95)
MONOCYTES NFR BLD: 10 % (ref 2–12)
NEUTROPHILS NFR BLD: 77 % (ref 43–80)
NEUTS SEG NFR BLD: 7.28 K/UL (ref 1.8–7.3)
PLATELET, FLUORESCENCE: 120 K/UL (ref 130–450)
PMV BLD AUTO: 13.5 FL (ref 7–12)
POTASSIUM SERPL-SCNC: 3.6 MMOL/L (ref 3.5–5)
RBC # BLD AUTO: 4.37 M/UL (ref 3.8–5.8)
SODIUM SERPL-SCNC: 141 MMOL/L (ref 132–146)
WBC OTHER # BLD: 9.5 K/UL (ref 4.5–11.5)

## 2024-04-09 PROCEDURE — 93971 EXTREMITY STUDY: CPT

## 2024-04-09 PROCEDURE — 2060000000 HC ICU INTERMEDIATE R&B

## 2024-04-09 PROCEDURE — 87040 BLOOD CULTURE FOR BACTERIA: CPT

## 2024-04-09 PROCEDURE — 2580000003 HC RX 258: Performed by: INTERNAL MEDICINE

## 2024-04-09 PROCEDURE — 6360000002 HC RX W HCPCS: Performed by: EMERGENCY MEDICINE

## 2024-04-09 PROCEDURE — 83605 ASSAY OF LACTIC ACID: CPT

## 2024-04-09 PROCEDURE — 6370000000 HC RX 637 (ALT 250 FOR IP): Performed by: INTERNAL MEDICINE

## 2024-04-09 PROCEDURE — 85025 COMPLETE CBC W/AUTO DIFF WBC: CPT

## 2024-04-09 PROCEDURE — 99223 1ST HOSP IP/OBS HIGH 75: CPT | Performed by: INTERNAL MEDICINE

## 2024-04-09 PROCEDURE — 80048 BASIC METABOLIC PNL TOTAL CA: CPT

## 2024-04-09 PROCEDURE — 2500000003 HC RX 250 WO HCPCS: Performed by: EMERGENCY MEDICINE

## 2024-04-09 PROCEDURE — 2580000003 HC RX 258: Performed by: EMERGENCY MEDICINE

## 2024-04-09 PROCEDURE — 99285 EMERGENCY DEPT VISIT HI MDM: CPT

## 2024-04-09 RX ORDER — PHENOL 1.4 %
1 AEROSOL, SPRAY (ML) MUCOUS MEMBRANE DAILY
COMMUNITY

## 2024-04-09 RX ORDER — ENOXAPARIN SODIUM 100 MG/ML
40 INJECTION SUBCUTANEOUS DAILY
Status: DISCONTINUED | OUTPATIENT
Start: 2024-04-09 | End: 2024-04-09

## 2024-04-09 RX ORDER — ONDANSETRON 2 MG/ML
4 INJECTION INTRAMUSCULAR; INTRAVENOUS EVERY 6 HOURS PRN
Status: DISCONTINUED | OUTPATIENT
Start: 2024-04-09 | End: 2024-04-11 | Stop reason: HOSPADM

## 2024-04-09 RX ORDER — PREGABALIN 25 MG/1
50 CAPSULE ORAL 3 TIMES DAILY
Status: DISCONTINUED | OUTPATIENT
Start: 2024-04-09 | End: 2024-04-11 | Stop reason: HOSPADM

## 2024-04-09 RX ORDER — ACETAMINOPHEN 325 MG/1
650 TABLET ORAL EVERY 6 HOURS PRN
Status: DISCONTINUED | OUTPATIENT
Start: 2024-04-09 | End: 2024-04-11 | Stop reason: HOSPADM

## 2024-04-09 RX ORDER — METOPROLOL SUCCINATE 25 MG/1
25 TABLET, EXTENDED RELEASE ORAL DAILY
Status: DISCONTINUED | OUTPATIENT
Start: 2024-04-09 | End: 2024-04-11 | Stop reason: HOSPADM

## 2024-04-09 RX ORDER — CALCIUM CARBONATE 500(1250)
500 TABLET ORAL DAILY
Status: DISCONTINUED | OUTPATIENT
Start: 2024-04-09 | End: 2024-04-11 | Stop reason: HOSPADM

## 2024-04-09 RX ORDER — ACETAMINOPHEN 650 MG/1
650 SUPPOSITORY RECTAL EVERY 6 HOURS PRN
Status: DISCONTINUED | OUTPATIENT
Start: 2024-04-09 | End: 2024-04-11 | Stop reason: HOSPADM

## 2024-04-09 RX ORDER — POTASSIUM CHLORIDE 7.45 MG/ML
10 INJECTION INTRAVENOUS PRN
Status: DISCONTINUED | OUTPATIENT
Start: 2024-04-09 | End: 2024-04-11 | Stop reason: HOSPADM

## 2024-04-09 RX ORDER — M-VIT,TX,IRON,MINS/CALC/FOLIC 27MG-0.4MG
1 TABLET ORAL DAILY
COMMUNITY

## 2024-04-09 RX ORDER — PHENOL 1.4 %
1 AEROSOL, SPRAY (ML) MUCOUS MEMBRANE DAILY
Status: DISCONTINUED | OUTPATIENT
Start: 2024-04-09 | End: 2024-04-09 | Stop reason: CLARIF

## 2024-04-09 RX ORDER — ONDANSETRON 4 MG/1
4 TABLET, ORALLY DISINTEGRATING ORAL EVERY 8 HOURS PRN
Status: DISCONTINUED | OUTPATIENT
Start: 2024-04-09 | End: 2024-04-11 | Stop reason: HOSPADM

## 2024-04-09 RX ORDER — SODIUM CHLORIDE 9 MG/ML
INJECTION, SOLUTION INTRAVENOUS PRN
Status: DISCONTINUED | OUTPATIENT
Start: 2024-04-09 | End: 2024-04-11 | Stop reason: HOSPADM

## 2024-04-09 RX ORDER — FUROSEMIDE 40 MG/1
40 TABLET ORAL DAILY
Status: DISCONTINUED | OUTPATIENT
Start: 2024-04-09 | End: 2024-04-11 | Stop reason: HOSPADM

## 2024-04-09 RX ORDER — POTASSIUM CHLORIDE 20 MEQ/1
40 TABLET, EXTENDED RELEASE ORAL PRN
Status: DISCONTINUED | OUTPATIENT
Start: 2024-04-09 | End: 2024-04-11 | Stop reason: HOSPADM

## 2024-04-09 RX ORDER — POLYETHYLENE GLYCOL 3350 17 G/17G
17 POWDER, FOR SOLUTION ORAL DAILY PRN
Status: DISCONTINUED | OUTPATIENT
Start: 2024-04-09 | End: 2024-04-11 | Stop reason: HOSPADM

## 2024-04-09 RX ORDER — M-VIT,TX,IRON,MINS/CALC/FOLIC 27MG-0.4MG
1 TABLET ORAL DAILY
Status: DISCONTINUED | OUTPATIENT
Start: 2024-04-09 | End: 2024-04-11 | Stop reason: HOSPADM

## 2024-04-09 RX ORDER — CEPHALEXIN 500 MG/1
500 CAPSULE ORAL 4 TIMES DAILY
Status: ON HOLD | COMMUNITY
Start: 2024-04-04 | End: 2024-04-11 | Stop reason: HOSPADM

## 2024-04-09 RX ORDER — SODIUM CHLORIDE 0.9 % (FLUSH) 0.9 %
5-40 SYRINGE (ML) INJECTION EVERY 12 HOURS SCHEDULED
Status: DISCONTINUED | OUTPATIENT
Start: 2024-04-09 | End: 2024-04-11 | Stop reason: HOSPADM

## 2024-04-09 RX ORDER — MAGNESIUM SULFATE IN WATER 40 MG/ML
2000 INJECTION, SOLUTION INTRAVENOUS PRN
Status: DISCONTINUED | OUTPATIENT
Start: 2024-04-09 | End: 2024-04-11 | Stop reason: HOSPADM

## 2024-04-09 RX ORDER — SODIUM CHLORIDE 0.9 % (FLUSH) 0.9 %
5-40 SYRINGE (ML) INJECTION PRN
Status: DISCONTINUED | OUTPATIENT
Start: 2024-04-09 | End: 2024-04-11 | Stop reason: HOSPADM

## 2024-04-09 RX ORDER — TAMSULOSIN HYDROCHLORIDE 0.4 MG/1
0.4 CAPSULE ORAL NIGHTLY
Status: DISCONTINUED | OUTPATIENT
Start: 2024-04-09 | End: 2024-04-11 | Stop reason: HOSPADM

## 2024-04-09 RX ADMIN — SODIUM CHLORIDE, PRESERVATIVE FREE 10 ML: 5 INJECTION INTRAVENOUS at 20:01

## 2024-04-09 RX ADMIN — APIXABAN 5 MG: 5 TABLET, FILM COATED ORAL at 20:01

## 2024-04-09 RX ADMIN — BENZOCAINE 6 MG-MENTHOL 10 MG LOZENGES 1 LOZENGE: at 20:01

## 2024-04-09 RX ADMIN — CEFAZOLIN 2000 MG: 2 INJECTION, POWDER, FOR SOLUTION INTRAMUSCULAR; INTRAVENOUS at 13:49

## 2024-04-09 RX ADMIN — CALCIUM 500 MG: 500 TABLET ORAL at 18:02

## 2024-04-09 RX ADMIN — PREGABALIN 50 MG: 25 CAPSULE ORAL at 18:02

## 2024-04-09 RX ADMIN — CHOLECALCIFEROL TAB 125 MCG (5000 UNIT) 5000 UNITS: 125 TAB at 18:05

## 2024-04-09 RX ADMIN — Medication 1 TABLET: at 18:02

## 2024-04-09 RX ADMIN — PREGABALIN 50 MG: 25 CAPSULE ORAL at 20:00

## 2024-04-09 RX ADMIN — TAMSULOSIN HYDROCHLORIDE 0.4 MG: 0.4 CAPSULE ORAL at 20:00

## 2024-04-09 RX ADMIN — METOPROLOL SUCCINATE 25 MG: 25 TABLET, EXTENDED RELEASE ORAL at 18:02

## 2024-04-09 RX ADMIN — DOXYCYCLINE 100 MG: 100 INJECTION, POWDER, LYOPHILIZED, FOR SOLUTION INTRAVENOUS at 13:58

## 2024-04-09 ASSESSMENT — ENCOUNTER SYMPTOMS
SHORTNESS OF BREATH: 0
ABDOMINAL PAIN: 0
VOMITING: 0
EYE REDNESS: 0
COLOR CHANGE: 1
NAUSEA: 0

## 2024-04-09 ASSESSMENT — LIFESTYLE VARIABLES
HOW OFTEN DO YOU HAVE A DRINK CONTAINING ALCOHOL: NEVER
HOW MANY STANDARD DRINKS CONTAINING ALCOHOL DO YOU HAVE ON A TYPICAL DAY: PATIENT DOES NOT DRINK

## 2024-04-09 NOTE — ED PROVIDER NOTES
[facility]  EMERGENCY DEPARTMENT ENCOUNTER        Pt Name: Deyvi Rose  MRN: [id number]  Birthdate 4/14/1934  Date of evaluation: 4/9/2024  Provider: Laura Brandon DO  PCP: Heather Ascencio MD  Note Started: 12:04 PM EDT 4/9/24    CHIEF COMPLAINT       Chief Complaint   Patient presents with    Cellulitis     Dx with cellulitis of R leg, PO medication not working        HISTORY OF PRESENT ILLNESS: 1 or more Elements       Deyvi Rose is a 89 y.o. male who presents to the emergency department with a chief complaint of cellulitis of the right lower extremity.  The history is obtained from the patient as well as patient's medical record.  The patient is presenting to the emergency department with a chief complaint of cellulitis.  He states this began approximately 1 week ago.  The patient has been on oral antibiotics at home and states that things seem to be worsening.  Due to some abdominal pain description burning sensation.  Is located in the distal right lower extremity.  He denies any fevers, chills, chest pain, nausea, vomiting or abdominal pain.    Nursing Notes were all reviewed and agreed with or any disagreements were addressed in the HPI.    REVIEW OF SYSTEMS :      Review of Systems   Constitutional:  Negative for fever.   HENT:  Negative for congestion.    Eyes:  Negative for redness.   Respiratory:  Negative for shortness of breath.    Cardiovascular:  Positive for leg swelling. Negative for chest pain.   Gastrointestinal:  Negative for abdominal pain, nausea and vomiting.   Genitourinary:  Negative for dysuria.   Musculoskeletal:  Negative for arthralgias.   Skin:  Positive for color change. Negative for rash.   Neurological:  Negative for dizziness.   Psychiatric/Behavioral:  Negative for confusion.    All other systems reviewed and are negative.      Positives and Pertinent negatives as per HPI.     SURGICAL HISTORY     Past Surgical History:   Procedure Laterality Date

## 2024-04-09 NOTE — PLAN OF CARE
Problem: Discharge Planning  Goal: Discharge to home or other facility with appropriate resources  4/9/2024 1747 by Disha Dover, RN  Outcome: Progressing     Problem: Safety - Adult  Goal: Free from fall injury  4/9/2024 1747 by Disha Dover, RN  Outcome: Progressing

## 2024-04-09 NOTE — H&P
[facility]   History and Physical      CHIEF COMPLAINT:  right lower leg redness    History of Present Illness:  89 y.o. male with a history of Arthritis atrial fibrillation hypertension GERD presents with 1 week history of worsening right leg pain.  He began Keflex 4/4/23.  The right lower leg is warm red and painful.  All of these factors seem to be persistent except he is in less pain. He attributed some pain to his chronic neuropathy.  Denies chills.  There has been no leukocytosis, heart rate 96, blood pressure normal    Informant(s) for H&P: ED doc chart patient    REVIEW OF SYSTEMS:  no fevers, chills, cp, sob, n/v, ha, vision/hearing changes, wt changes, hot/cold flashes, other open skin lesions, diarrhea, constipation, dysuria/hematuria unless noted in HPI. Complete ROS performed with the patient and is otherwise negative.      PMH:  Past Medical History:   Diagnosis Date    Arthritis     Atrial fibrillation, chronic (HCC)     GERD (gastroesophageal reflux disease)     HTN (hypertension)        Surgical History:  Past Surgical History:   Procedure Laterality Date    CARDIOVERSION  03/28/2023    Successful   Dr Laughlin    CARDIOVERSION  04/24/2023    Successful  Dr. Laughlin    CARDIOVERSION  05/09/2023    Successful  Dr Laughlin    CARPAL TUNNEL RELEASE Right 2017    CARPAL TUNNEL RELEASE Left 2017    COLECTOMY      for polyps    COLONOSCOPY      ELBOW SURGERY      tendon reattachment    EYE SURGERY Bilateral     cataracts    FOOT SURGERY Right 07/25/2018    fusion 1st mpj right foot    DE OSTEOT W/WO LNGTH SHRT/CORRJ 1ST METAR Right 07/25/2018    FUSION 1ST MPJ RIGHT FOOT performed by SOFIA Falcon Jr.M at [facility]         Medications Prior to Admission:    Prior to Admission medications    Medication Sig Start Date End Date Taking? Authorizing Provider   pregabalin (LYRICA) 50 MG capsule Take 1 capsule by mouth 3 times daily for 120 days. Max Daily

## 2024-04-10 LAB
ANION GAP SERPL CALCULATED.3IONS-SCNC: 10 MMOL/L (ref 7–16)
BASOPHILS # BLD: 0.03 K/UL (ref 0–0.2)
BASOPHILS NFR BLD: 0 % (ref 0–2)
BUN SERPL-MCNC: 21 MG/DL (ref 6–23)
CALCIUM SERPL-MCNC: 9.1 MG/DL (ref 8.6–10.2)
CHLORIDE SERPL-SCNC: 103 MMOL/L (ref 98–107)
CO2 SERPL-SCNC: 27 MMOL/L (ref 22–29)
CREAT SERPL-MCNC: 0.8 MG/DL (ref 0.7–1.2)
EKG ATRIAL RATE: 56 BPM
EKG Q-T INTERVAL: 376 MS
EKG QRS DURATION: 84 MS
EKG QTC CALCULATION (BAZETT): 447 MS
EKG R AXIS: -13 DEGREES
EKG T AXIS: -11 DEGREES
EKG VENTRICULAR RATE: 85 BPM
EOSINOPHIL # BLD: 0.07 K/UL (ref 0.05–0.5)
EOSINOPHILS RELATIVE PERCENT: 1 % (ref 0–6)
ERYTHROCYTE [DISTWIDTH] IN BLOOD BY AUTOMATED COUNT: 14.5 % (ref 11.5–15)
GFR SERPL CREATININE-BSD FRML MDRD: 85 ML/MIN/1.73M2
GLUCOSE SERPL-MCNC: 90 MG/DL (ref 74–99)
HCT VFR BLD AUTO: 38 % (ref 37–54)
HGB BLD-MCNC: 12.4 G/DL (ref 12.5–16.5)
IMM GRANULOCYTES # BLD AUTO: 0.06 K/UL (ref 0–0.58)
IMM GRANULOCYTES NFR BLD: 1 % (ref 0–5)
LYMPHOCYTES NFR BLD: 1.21 K/UL (ref 1.5–4)
LYMPHOCYTES RELATIVE PERCENT: 16 % (ref 20–42)
MCH RBC QN AUTO: 30.1 PG (ref 26–35)
MCHC RBC AUTO-ENTMCNC: 32.6 G/DL (ref 32–34.5)
MCV RBC AUTO: 92.2 FL (ref 80–99.9)
MONOCYTES NFR BLD: 1.11 K/UL (ref 0.1–0.95)
MONOCYTES NFR BLD: 15 % (ref 2–12)
NEUTROPHILS NFR BLD: 67 % (ref 43–80)
NEUTS SEG NFR BLD: 5.1 K/UL (ref 1.8–7.3)
PLATELET # BLD AUTO: 117 K/UL (ref 130–450)
PMV BLD AUTO: 13.7 FL (ref 7–12)
POTASSIUM SERPL-SCNC: 3.8 MMOL/L (ref 3.5–5)
RBC # BLD AUTO: 4.12 M/UL (ref 3.8–5.8)
SODIUM SERPL-SCNC: 140 MMOL/L (ref 132–146)
WBC OTHER # BLD: 7.6 K/UL (ref 4.5–11.5)

## 2024-04-10 PROCEDURE — 93010 ELECTROCARDIOGRAM REPORT: CPT | Performed by: INTERNAL MEDICINE

## 2024-04-10 PROCEDURE — 93005 ELECTROCARDIOGRAM TRACING: CPT | Performed by: INTERNAL MEDICINE

## 2024-04-10 PROCEDURE — 85025 COMPLETE CBC W/AUTO DIFF WBC: CPT

## 2024-04-10 PROCEDURE — 6370000000 HC RX 637 (ALT 250 FOR IP): Performed by: INTERNAL MEDICINE

## 2024-04-10 PROCEDURE — 2500000003 HC RX 250 WO HCPCS: Performed by: INTERNAL MEDICINE

## 2024-04-10 PROCEDURE — 99232 SBSQ HOSP IP/OBS MODERATE 35: CPT | Performed by: INTERNAL MEDICINE

## 2024-04-10 PROCEDURE — 2060000000 HC ICU INTERMEDIATE R&B

## 2024-04-10 PROCEDURE — 80048 BASIC METABOLIC PNL TOTAL CA: CPT

## 2024-04-10 PROCEDURE — 36415 COLL VENOUS BLD VENIPUNCTURE: CPT

## 2024-04-10 PROCEDURE — 97161 PT EVAL LOW COMPLEX 20 MIN: CPT

## 2024-04-10 PROCEDURE — 97165 OT EVAL LOW COMPLEX 30 MIN: CPT

## 2024-04-10 PROCEDURE — 2580000003 HC RX 258: Performed by: INTERNAL MEDICINE

## 2024-04-10 PROCEDURE — 97530 THERAPEUTIC ACTIVITIES: CPT

## 2024-04-10 RX ADMIN — SODIUM CHLORIDE, PRESERVATIVE FREE 10 ML: 5 INJECTION INTRAVENOUS at 07:59

## 2024-04-10 RX ADMIN — CALCIUM 500 MG: 500 TABLET ORAL at 07:59

## 2024-04-10 RX ADMIN — DOXYCYCLINE 100 MG: 100 INJECTION, POWDER, LYOPHILIZED, FOR SOLUTION INTRAVENOUS at 02:12

## 2024-04-10 RX ADMIN — BENZOCAINE 6 MG-MENTHOL 10 MG LOZENGES 1 LOZENGE: at 15:07

## 2024-04-10 RX ADMIN — TAMSULOSIN HYDROCHLORIDE 0.4 MG: 0.4 CAPSULE ORAL at 20:29

## 2024-04-10 RX ADMIN — Medication 1 TABLET: at 07:59

## 2024-04-10 RX ADMIN — APIXABAN 5 MG: 5 TABLET, FILM COATED ORAL at 20:29

## 2024-04-10 RX ADMIN — BENZOCAINE 6 MG-MENTHOL 10 MG LOZENGES 1 LOZENGE: at 20:29

## 2024-04-10 RX ADMIN — PREGABALIN 50 MG: 25 CAPSULE ORAL at 07:59

## 2024-04-10 RX ADMIN — PREGABALIN 50 MG: 25 CAPSULE ORAL at 20:29

## 2024-04-10 RX ADMIN — PREGABALIN 50 MG: 25 CAPSULE ORAL at 15:03

## 2024-04-10 RX ADMIN — FUROSEMIDE 40 MG: 40 TABLET ORAL at 07:59

## 2024-04-10 RX ADMIN — DOXYCYCLINE 100 MG: 100 INJECTION, POWDER, LYOPHILIZED, FOR SOLUTION INTRAVENOUS at 15:03

## 2024-04-10 RX ADMIN — CHOLECALCIFEROL TAB 125 MCG (5000 UNIT) 5000 UNITS: 125 TAB at 08:01

## 2024-04-10 RX ADMIN — SODIUM CHLORIDE, PRESERVATIVE FREE 10 ML: 5 INJECTION INTRAVENOUS at 20:29

## 2024-04-10 RX ADMIN — METOPROLOL SUCCINATE 25 MG: 25 TABLET, EXTENDED RELEASE ORAL at 15:03

## 2024-04-10 RX ADMIN — APIXABAN 5 MG: 5 TABLET, FILM COATED ORAL at 07:59

## 2024-04-10 NOTE — PLAN OF CARE
Problem: Discharge Planning  Goal: Discharge to home or other facility with appropriate resources  4/10/2024 0201 by Amy Storey, RN  Outcome: Progressing  4/9/2024 1747 by Disha Dover RN  Outcome: Progressing  4/9/2024 1747 by Disha Dover, RN  Outcome: Progressing     Problem: Safety - Adult  Goal: Free from fall injury  4/10/2024 0201 by Amy Storey, RN  Outcome: Progressing  4/9/2024 1747 by Disha Dover, RN  Outcome: Progressing  4/9/2024 1747 by Disha Dover, RN  Outcome: Progressing

## 2024-04-10 NOTE — PLAN OF CARE
Problem: Discharge Planning  Goal: Discharge to home or other facility with appropriate resources  4/10/2024 1431 by Deyvi Joe, RN  Outcome: Progressing  Flowsheets (Taken 4/10/2024 0705)  Discharge to home or other facility with appropriate resources:   Identify barriers to discharge with patient and caregiver   Arrange for needed discharge resources and transportation as appropriate  4/10/2024 0201 by Amy Storey, RN  Outcome: Progressing     Problem: Safety - Adult  Goal: Free from fall injury  4/10/2024 1431 by Deyvi Joe RN  Outcome: Progressing  Flowsheets (Taken 4/10/2024 0705)  Free From Fall Injury: Instruct family/caregiver on patient safety  4/10/2024 0201 by mAy Storey, RN  Outcome: Progressing

## 2024-04-10 NOTE — ACP (ADVANCE CARE PLANNING)
Advance Care Planning   Healthcare Decision Maker:    Primary Decision Maker: Kenyetta Rose - Spouse - [phone]    Secondary Decision Maker: Myra Langley - Child - [phone]

## 2024-04-11 VITALS
DIASTOLIC BLOOD PRESSURE: 67 MMHG | WEIGHT: 204 LBS | OXYGEN SATURATION: 98 % | SYSTOLIC BLOOD PRESSURE: 127 MMHG | HEART RATE: 92 BPM | BODY MASS INDEX: 32.02 KG/M2 | HEIGHT: 67 IN | TEMPERATURE: 97.8 F | RESPIRATION RATE: 17 BRPM

## 2024-04-11 LAB
ANION GAP SERPL CALCULATED.3IONS-SCNC: 10 MMOL/L (ref 7–16)
BUN SERPL-MCNC: 16 MG/DL (ref 6–23)
CALCIUM SERPL-MCNC: 8.9 MG/DL (ref 8.6–10.2)
CHLORIDE SERPL-SCNC: 101 MMOL/L (ref 98–107)
CO2 SERPL-SCNC: 25 MMOL/L (ref 22–29)
CREAT SERPL-MCNC: 0.8 MG/DL (ref 0.7–1.2)
GFR SERPL CREATININE-BSD FRML MDRD: 86 ML/MIN/1.73M2
GLUCOSE SERPL-MCNC: 92 MG/DL (ref 74–99)
POTASSIUM SERPL-SCNC: 3.5 MMOL/L (ref 3.5–5)
SODIUM SERPL-SCNC: 136 MMOL/L (ref 132–146)

## 2024-04-11 PROCEDURE — 99239 HOSP IP/OBS DSCHRG MGMT >30: CPT | Performed by: INTERNAL MEDICINE

## 2024-04-11 PROCEDURE — 6370000000 HC RX 637 (ALT 250 FOR IP): Performed by: INTERNAL MEDICINE

## 2024-04-11 PROCEDURE — 2500000003 HC RX 250 WO HCPCS: Performed by: INTERNAL MEDICINE

## 2024-04-11 PROCEDURE — 2580000003 HC RX 258: Performed by: INTERNAL MEDICINE

## 2024-04-11 PROCEDURE — 80048 BASIC METABOLIC PNL TOTAL CA: CPT

## 2024-04-11 PROCEDURE — 36415 COLL VENOUS BLD VENIPUNCTURE: CPT

## 2024-04-11 RX ORDER — DOXYCYCLINE HYCLATE 100 MG
100 TABLET ORAL 2 TIMES DAILY
Qty: 10 TABLET | Refills: 0 | Status: SHIPPED | OUTPATIENT
Start: 2024-04-11 | End: 2024-04-16

## 2024-04-11 RX ADMIN — DOXYCYCLINE 100 MG: 100 INJECTION, POWDER, LYOPHILIZED, FOR SOLUTION INTRAVENOUS at 01:54

## 2024-04-11 RX ADMIN — ACETAMINOPHEN 650 MG: 325 TABLET ORAL at 08:56

## 2024-04-11 RX ADMIN — PREGABALIN 50 MG: 25 CAPSULE ORAL at 08:55

## 2024-04-11 RX ADMIN — FUROSEMIDE 40 MG: 40 TABLET ORAL at 08:55

## 2024-04-11 RX ADMIN — CALCIUM 500 MG: 500 TABLET ORAL at 08:56

## 2024-04-11 RX ADMIN — SODIUM CHLORIDE, PRESERVATIVE FREE 10 ML: 5 INJECTION INTRAVENOUS at 08:59

## 2024-04-11 RX ADMIN — CHOLECALCIFEROL TAB 125 MCG (5000 UNIT) 5000 UNITS: 125 TAB at 09:25

## 2024-04-11 RX ADMIN — APIXABAN 5 MG: 5 TABLET, FILM COATED ORAL at 08:55

## 2024-04-11 RX ADMIN — Medication 1 TABLET: at 08:55

## 2024-04-11 ASSESSMENT — PAIN DESCRIPTION - DESCRIPTORS
DESCRIPTORS: BURNING
DESCRIPTORS: BURNING

## 2024-04-11 ASSESSMENT — PAIN DESCRIPTION - ORIENTATION
ORIENTATION: LOWER
ORIENTATION: LOWER

## 2024-04-11 ASSESSMENT — PAIN SCALES - GENERAL
PAINLEVEL_OUTOF10: 10
PAINLEVEL_OUTOF10: 10
PAINLEVEL_OUTOF10: 0

## 2024-04-11 ASSESSMENT — PAIN - FUNCTIONAL ASSESSMENT
PAIN_FUNCTIONAL_ASSESSMENT: PREVENTS OR INTERFERES SOME ACTIVE ACTIVITIES AND ADLS
PAIN_FUNCTIONAL_ASSESSMENT: PREVENTS OR INTERFERES SOME ACTIVE ACTIVITIES AND ADLS

## 2024-04-11 ASSESSMENT — PAIN DESCRIPTION - LOCATION
LOCATION: KNEE
LOCATION: KNEE

## 2024-04-11 NOTE — CARE COORDINATION
4/11/24 1131 CM note: no covid testing, bl cx 4/9/24 ngtd. Room air. Discharge order noted. Po doxycycline. Discharge plan is home with resumption of [facility]. HHC orders noted. Notified [facility], of pts dc today.  Pt resides with his wife Kenyetta, is mostly independent with ADLs but his wife assists him as needed. Pt no longer drives, his wife provides his transportation. His DME includes a cane, ww, shower chair, and bipap through [employer]. Pts wife will provide transportation home. Electronically signed by Wendy Pedersen RN on 4/11/2024 at 12:13 PM    
                     Wendy Pedersen RN  Case Management Department

## 2024-04-11 NOTE — PLAN OF CARE
Problem: Discharge Planning  Goal: Discharge to home or other facility with appropriate resources  4/10/2024 2209 by Amy Storey, RN  Outcome: Progressing  4/10/2024 1431 by Deyvi Joe, RN  Outcome: Progressing  Flowsheets (Taken 4/10/2024 0705)  Discharge to home or other facility with appropriate resources:   Identify barriers to discharge with patient and caregiver   Arrange for needed discharge resources and transportation as appropriate     Problem: Safety - Adult  Goal: Free from fall injury  4/10/2024 2209 by Amy Storey, RN  Outcome: Progressing  4/10/2024 1431 by Deyvi Joe, RN  Outcome: Progressing  Flowsheets (Taken 4/10/2024 0705)  Free From Fall Injury: Instruct family/caregiver on patient safety

## 2024-04-11 NOTE — PLAN OF CARE
Problem: Discharge Planning  Goal: Discharge to home or other facility with appropriate resources  4/11/2024 1151 by Ivett Lopez, RN  Outcome: Adequate for Discharge  4/11/2024 0935 by Malissa Kingston RN  Outcome: Progressing  Flowsheets (Taken 4/11/2024 0901)  Discharge to home or other facility with appropriate resources: Identify barriers to discharge with patient and caregiver     Problem: Safety - Adult  Goal: Free from fall injury  4/11/2024 1151 by Ivett Lopez, RN  Outcome: Adequate for Discharge  4/11/2024 0935 by Malissa Kingston RN  Outcome: Progressing     Problem: Chronic Conditions and Co-morbidities  Goal: Patient's chronic conditions and co-morbidity symptoms are monitored and maintained or improved  4/11/2024 1151 by Ivett Lopez, RN  Outcome: Adequate for Discharge  4/11/2024 0935 by Malissa Kingston RN  Outcome: Progressing  Flowsheets (Taken 4/11/2024 0901)  Care Plan - Patient's Chronic Conditions and Co-Morbidity Symptoms are Monitored and Maintained or Improved: Monitor and assess patient's chronic conditions and comorbid symptoms for stability, deterioration, or improvement

## 2024-04-11 NOTE — DISCHARGE INSTRUCTIONS
Follow up with  you primary care physician during the next 7 days after discharge.  Talk to you physician about your supplements            Your information:  Name: Deyvi Rose  : 1934    Your instructions:    YOU ARE BEING DISCHARGED HOME. PLEASE MAKE AND KEEP ALL FOLLOW-UP APPOINTMENTS. IF YOU EXPERIENCE CHEST PAIN, SHORTNESS OF BREATH, SWEATING, LIGHTHEADEDNESS, OR PALPITATIONS: CALL 911 OR GO TO THE EMERGENCY DEPARTMENT IMMEDIATELY. If you begin to feel unwell or symptoms persist, contact your physician.    What to do after you leave the hospital:    Recommended diet:  Low Fat/Low Chol/High Fiber/ 2 Gram Sodium    Recommended activity: activity as tolerated        The following personal items were collected during your admission and were returned to you:    Belongings  Dental Appliances: Uppers, Lowers  Vision - Corrective Lenses: None, At home  Hearing Aid: Bilateral hearing aids, At home  Clothing: Pajamas, Undergarments, Slippers, Socks  Jewelry: None  Body Piercings Removed: No  Electronic Devices: Cell Phone  Weapons (Notify Protective Services/Security): None  Other Valuables: Cane, Cpap/BiPap  Home Medications: None  Valuables Given To: Family (Comment) (Wife)  Provide Name(s) of Who Valuable(s) Were Given To: Wife  Responsible person(s) in the waiting room: NA  Patient approves for provider to speak to responsible person post operatively: Yes    Information obtained by:  By signing below, I understand that if any problems occur once I leave the hospital I am to contact MD Dean.  I understand and acknowledge receipt of the instructions indicated above.

## 2024-04-11 NOTE — PROGRESS NOTES
Admitting Date and Time: 4/9/2024 11:45 AM  Admit Dx: Cellulitis [L03.90]  Cellulitis of right lower extremity [L03.115]    Subjective:    Pt feels  leg less pain  Per RN:  no issue    ROS: denies fever, chills, cp, sob, n/v, HA unless stated above.     sodium chloride flush  5-40 mL IntraVENous 2 times per day    doxycycline (VIBRAMYCIN) IV  100 mg IntraVENous Q12H    apixaban  5 mg Oral BID    furosemide  40 mg Oral Daily    metoprolol succinate  25 mg Oral Daily    therapeutic multivitamin-minerals  1 tablet Oral Daily    tamsulosin  0.4 mg Oral Nightly    vitamin D3  5,000 Units Oral Daily    pregabalin  50 mg Oral TID    calcium elemental  500 mg Oral Daily     sodium chloride flush, 5-40 mL, PRN  sodium chloride, , PRN  potassium chloride, 40 mEq, PRN   Or  potassium alternative oral replacement, 40 mEq, PRN   Or  potassium chloride, 10 mEq, PRN  magnesium sulfate, 2,000 mg, PRN  ondansetron, 4 mg, Q8H PRN   Or  ondansetron, 4 mg, Q6H PRN  polyethylene glycol, 17 g, Daily PRN  acetaminophen, 650 mg, Q6H PRN   Or  acetaminophen, 650 mg, Q6H PRN  Benzocaine-Menthol, 1 lozenge, Q2H PRN         Objective:    /75   Pulse 81   Temp 98.4 °F (36.9 °C) (Oral)   Resp 18   Ht 1.702 m (5' 7.01\")   Wt 92.5 kg (204 lb)   SpO2 96%   BMI 31.94 kg/m²   General Appearance: alert and oriented to person, place and time and in no acute distress  Skin: warm and dry  Head: normocephalic and atraumatic  Eyes: pupils equal, round, and reactive to light, extraocular eye movements intact, conjunctivae normal  Neck: neck supple and non tender without mass   Pulmonary/Chest: clear to auscultation bilaterally- no wheezes, rales or rhonchi, normal air movement, no respiratory distress  Cardiovascular: normal rate, normal S1 and S2 and no carotid bruits  Abdomen: soft, non-tender, non-distended, normal bowel sounds, no masses or organomegaly  Extremities: no cyanosis, no clubbing and   R> L leg edema.   Right leg is less Red 
CLINICAL PHARMACY NOTE: MEDS TO BEDS    Total # of Prescriptions Filled: 1   The following medications were delivered to the patient:  Doxycycline hyclate 100 mg    Additional Documentation:    
transfers/mobility and ADLs  * Therapeutic exercise to improve motor endurance, ROM, and functional strength for ADLs/functional transfers  * Therapeutic activities to facilitate/challenge dynamic balance, stand tolerance for increased safety and independence with ADLs  * Therapeutic activities to facilitate gross/fine motor skills for increased independence with ADLs  * Positioning to improve skin integrity, interaction with environment and functional independence      Recommended Adaptive Equipment:  TBD     Home Living:  Pt lives with wife in a 2 story with chair lift to 2nd floor, 4 step(s) to enter and 1 rail(s)- grandson is going to install a 2nd rail.; bed/bath on 2nd   Bathroom setup: walk in shower with shower door, pt stated they are going to have them removed . Grab bars  Equipment owned: ww, cane, shower chair.     Prior Level of Function:  assist from wife for LE dressing and ADLs as needed,    assist with IADLs. Ambulated with ww    Driving: no  Occupation/leisure: retired    Pain Level: none    Cognition: A&O: 4/4;   Follows multi step directions: good    Memory:  good    Sequencing:  good    Problem solving:  fair    Judgement/safety:  fair     Functional Assessment:   AM-PAC Daily Activity Raw Score: 15/24     Initial Eval Status  Date: 4/10/24 Treatment Status  Date: STG=LTG  Time frame: 10-14 days   Feeding Stand by Assist   Independent    Grooming Minimal Assist   Limited shoulder AROM BUE  Modified [place]    UB Dressing Minimal Assist   Modified [place]    LB Dressing Moderate Assist   Minimal Assist    Bathing Moderate Assist  Stand by Assist    Toileting Moderate Assist   For functional transfers from elevated commode in bathroom.  Stand by Assist    Bed Mobility  Supine to sit: NT   Sit to supine: NT   Supine to sit: Independent   Sit to supine: Independent    Functional Transfers Sit to stand: Moderate Assist   Stand to sit: Moderate Assist   Stand pivot: Moderate Assist   
sitting balance and activity tolerance. Pt stood, ambulated in hallway and back to EOB, performed exercises.      Therapeutic Exercises:  ankle pumps and long arc quad  x 5 reps.       At end of session, patient in bed with alarm call light and phone within reach,  all lines and tubes intact, nursing notified.      Patient would benefit from continued skilled Physical Therapy to improve functional independence and quality of life.         Patient's/ family goals   home    Time in  1502  Time out  1532    Total Treatment Time  10 minutes    Evaluation time includes thorough review of current medical information, gathering information on past medical history/social history and prior level of function, completion of standardized testing/informal observation of tasks, assessment of data, and development of Plan of care and goals.     CPT codes:  Low Complexity PT evaluation (56532)  Therapeutic activities (85673)   10 minutes  1 unit(s)    Rajni Reeder PT

## 2024-04-11 NOTE — PLAN OF CARE
Problem: Discharge Planning  Goal: Discharge to home or other facility with appropriate resources  4/11/2024 0935 by Malissa Kingston RN  Outcome: Progressing  Flowsheets (Taken 4/11/2024 0901)  Discharge to home or other facility with appropriate resources: Identify barriers to discharge with patient and caregiver  4/10/2024 2209 by Amy Storey RN  Outcome: Progressing     Problem: Safety - Adult  Goal: Free from fall injury  4/11/2024 0935 by Malissa Kingston RN  Outcome: Progressing  4/10/2024 2209 by Amy Storey RN  Outcome: Progressing     Problem: Chronic Conditions and Co-morbidities  Goal: Patient's chronic conditions and co-morbidity symptoms are monitored and maintained or improved  Outcome: Progressing  Flowsheets (Taken 4/11/2024 0901)  Care Plan - Patient's Chronic Conditions and Co-Morbidity Symptoms are Monitored and Maintained or Improved: Monitor and assess patient's chronic conditions and comorbid symptoms for stability, deterioration, or improvement

## 2024-04-11 NOTE — DISCHARGE SUMMARY
[facility] Hospitalist       Hospitalist Physician Discharge Summary       No follow-up provider specified.    Activity level: Slowly increase as tolerated    Diet: ADULT DIET; Regular; Low Fat/Low Chol/High Fiber/2 gm Na    Labs: None are pending at the discharge    Condition at discharge: Stable    Dispo: Return to home setting     Patient ID:  Deyvi Rose  [id number]  89 y.o.  4/14/1934    Admit date: 4/9/2024    Discharge date and time:  4/11/2024  8:50 AM    Admission Diagnoses: Principal Problem:    Cellulitis  Resolved Problems:    * No resolved hospital problems. *      Discharge Diagnoses: Principal Problem:    Cellulitis  Resolved Problems:    * No resolved hospital problems. *      Consults:  None    Procedures: None significant except if described in hospital course.    Hospital Course: History of present illness from History and Physical:  89 y.o. male with a history of Arthritis atrial fibrillation hypertension GERD presents with 1 week history of worsening right leg pain.  He began Keflex 4/4/23.  The right lower leg is warm red and painful.  All of these factors seem to be persistent except he is in less pain. He attributed some pain to his chronic neuropathy.  Denies chills.  There has been no leukocytosis, heart rate 96, blood pressure normal      Cellulitis : failed keflex qid therefore switched to inpatient doxy IV.  He is from the community not a facility. Still no leukocytosis.  Still no fever.   clinical response now readily apparent with less redness less tenderness less edema not warm.  Home with po doxy x 5 more days  EKG a fib normal vent rate  No change to outpatient treatment regimen for the existing stable combordities including: Arthritis atrial fibrillation hypertension GERD presents with 1 week history of worsening right leg.    Full Code  DVT in hx so treating with: Eliquis         Discharge Exam:  Vitals:    04/10/24 2330 04/11/24 0134 04/11/24 0346 04/11/24 0736

## 2024-04-12 ENCOUNTER — CARE COORDINATION (OUTPATIENT)
Dept: CARE COORDINATION | Age: 89
End: 2024-04-12

## 2024-04-12 NOTE — CARE COORDINATION
Care Transitions Note    Initial Call - Call within 2 business days of discharge: Yes      First attempt to reach the patient for initial Care Transition call post hospital discharge. HIPAA compliant message left with CTN's contact information requesting return phone call.    Will route a message to [facility] requesting that an attempt be made to contact the patient to schedule TCM visit within 7 days of discharge, discharge date 24.    Spoke with Arti at [facility], she confirmed they received SADAF orders and the patient is on the schedule for 4/15/24.     Outreach Attempts:   HIPAA compliant voicemail left for spouse/partner .     Patient: Deyvi Rose    Patient : 1934   MRN: [id number]    Reason for Admission: Cellulitis RLE   Discharge Date: 24  RURS:  9     Last Discharge Facility       Date Complaint Diagnosis Description Type Department Provider    24 Cellulitis Cellulitis of right lower extremity ED to Hosp-Admission (Discharged) (ADMITTED) [facility] Eliecer Sinclair MD; Laura Brandon...      Follow Up Appointment:     Future Appointments         Provider Specialty Dept Phone    4/15/2024 8:15 AM Elida Laughlin MD Cardiac Electrophysiology [phone]    2024 8:30 PM [facility] SLEEP LAB ROOM 1 Sleep Center [phone]    2024 1:40 PM Katherine English APRN - CNP Sleep Center [phone]    2024 12:00 PM Heather Ascencio MD Primary Care [phone]          Rosa Phillips RN

## 2024-04-14 LAB
MICROORGANISM SPEC CULT: NORMAL
MICROORGANISM SPEC CULT: NORMAL
SERVICE CMNT-IMP: NORMAL
SERVICE CMNT-IMP: NORMAL
SPECIMEN DESCRIPTION: NORMAL
SPECIMEN DESCRIPTION: NORMAL

## 2024-04-15 ENCOUNTER — TELEPHONE (OUTPATIENT)
Age: 89
End: 2024-04-15

## 2024-04-15 ENCOUNTER — CARE COORDINATION (OUTPATIENT)
Dept: CARE COORDINATION | Age: 89
End: 2024-04-15

## 2024-04-15 NOTE — TELEPHONE ENCOUNTER
[facility] INSURANCE AUTH    Insurance Company:  [employer]    CPT Codes:  LAAO CPT: 54513  CHEKO CPT: 31421    Date of Service:  5/20/24    ICD Codes:   Persistent AF: I48.1  High Risk Bleeding: Z91.89  Repeated Falls: R293.6    Dr. Bates           NPI:  [id number]                            Tax ID: [id number]                      SEYZ:  NPI: [id number]  Tax ID: [id number]    Approval Status:   Approved   Approval Dates: 5/20/24 - 6 months  Auth#: [id number]

## 2024-04-15 NOTE — CARE COORDINATION
Care Transitions Note    Initial Call - Call within 2 business days of discharge: Yes     Second and final attempt to reach the patient for initial Care Transition call post hospital discharge. HIPAA compliant message left with CTN's contact information requesting return phone call.    Will route a second message to Dr. Ascencio's office requesting that an attempt be made to contact the patient to schedule TCM visit within 7 days of discharge, discharge date 24  If no return call by the end of today, CTN will sign off and resolve CT program.      Outreach Attempts:   HIPAA compliant voicemail left for patient.     Patient: Deyvi Rose    Patient : 1934   MRN: [id number]    Reason for Admission: Cellulitis RLE   Discharge Date: 24  RURS: 9   Last Discharge Facility       Date Complaint Diagnosis Description Type Department Provider    24 Cellulitis Cellulitis of right lower extremity ED to Hosp-Admission (Discharged) (ADMITTED) Eliecer Monge MD; Laura Brandon...   Follow Up Appointment:   Future Appointments         Provider Specialty Dept Phone    2024 8:30 PM [facility] SLEEP LAB ROOM 1 Sleep Center [phone]    2024 1:40 PM Katherine English, APRN - CNP Sleep Center [phone]    2024 12:00 PM Heather Ascencio MD Primary Care [phone]          Rosa Phillips RN

## 2024-04-16 ENCOUNTER — HOSPITAL ENCOUNTER (OUTPATIENT)
Dept: SLEEP CENTER | Age: 89
Discharge: HOME | End: 2024-04-16
Payer: MEDICARE

## 2024-04-16 DIAGNOSIS — G47.31 COMPLEX SLEEP APNEA SYNDROME: ICD-10-CM

## 2024-04-16 PROCEDURE — 95811 POLYSOM 6/>YRS CPAP 4/> PARM: CPT

## 2024-04-16 NOTE — PROGRESS NOTES
06:22 AM    K 3.5 04/11/2024 06:22 AM     04/11/2024 06:22 AM    CO2 25 04/11/2024 06:22 AM    BUN 16 04/11/2024 06:22 AM    CREATININE 0.8 04/11/2024 06:22 AM    GFRAA >60 08/09/2022 12:00 PM    LABGLOM 86 04/11/2024 06:22 AM    GLUCOSE 92 04/11/2024 06:22 AM    CALCIUM 8.9 04/11/2024 06:22 AM    BILITOT 0.7 02/09/2024 08:12 AM    ALKPHOS 91 02/09/2024 08:12 AM    AST 30 02/09/2024 08:12 AM    ALT 18 02/09/2024 08:12 AM     Lipid:   Lab Results   Component Value Date    CHOL 141 04/17/2023    CHOL 100 08/09/2022     Lab Results   Component Value Date    TRIG 144 04/17/2023    TRIG 71 08/09/2022     Lab Results   Component Value Date    HDL 30 (L) 02/09/2024    HDL 22 (L) 10/30/2023    HDL 35 04/17/2023     No components found for: \"LDLCHOLESTEROL\", \"LDLCALC\"    Lab Results   Component Value Date    VLDL 32 02/09/2024    VLDL 33 10/30/2023    VLDL 29 04/17/2023     No results found for: \"CHOLHDLRATIO\"  CBC:   Lab Results   Component Value Date/Time    WBC 7.6 04/10/2024 07:34 AM    RBC 4.12 04/10/2024 07:34 AM    HGB 12.4 04/10/2024 07:34 AM    HCT 38.0 04/10/2024 07:34 AM    MCV 92.2 04/10/2024 07:34 AM    MCH 30.1 04/10/2024 07:34 AM    MCHC 32.6 04/10/2024 07:34 AM    RDW 14.5 04/10/2024 07:34 AM     04/10/2024 07:34 AM    MPV 13.7 04/10/2024 07:34 AM     Potassium:    Lab Results   Component Value Date/Time    K 3.5 04/11/2024 06:22 AM            Objective   /70   Pulse 96   Temp 97.5 °F (36.4 °C) (Temporal)   Ht 1.702 m (5' 7\")   Wt 93.4 kg (206 lb)   SpO2 98%   BMI 32.26 kg/m²   Current Outpatient Medications   Medication Sig Dispense Refill    pregabalin (LYRICA) 50 MG capsule Take 1 capsule by mouth 3 times daily for 120 days. Max Daily Amount: 150 mg 90 capsule 3    metOLazone (ZAROXOLYN) 2.5 MG tablet Take 1 tablet by mouth daily      Multiple Vitamins-Minerals (THERAPEUTIC MULTIVITAMIN-MINERALS) tablet Take 1 tablet by mouth daily      calcium carbonate 600 MG TABS tablet Take 1

## 2024-05-06 ENCOUNTER — TELEPHONE (OUTPATIENT)
Dept: CARDIOLOGY CLINIC | Age: 89
End: 2024-05-06

## 2024-05-06 DIAGNOSIS — Z91.89 AT HIGH RISK FOR BLEEDING: ICD-10-CM

## 2024-05-06 DIAGNOSIS — I48.19 PERSISTENT ATRIAL FIBRILLATION (HCC): Primary | ICD-10-CM

## 2024-05-06 NOTE — TELEPHONE ENCOUNTER
CAT[facility] PREOP INSTRUCTIONS     I called Deyvi's wife Debbie & reviewed the Preop Watchman Instructions   Preop Labs will be drawn at the [facility] on 5/14/24 when he goes there for his cancer visit   NPO post midnight the night before the procedure   Date & Time of Watchman procedure: 5/20/24 at 7:30 am  Arrival Time: 6:00 am  Diuretic Instructions: Hold Lasix 5/20  Last dose of Eliquis: 5/17/24  More detailed instructions along with all of the lab orders mailed to pt & emailed to Debbie at [email or web address]        Preop Modified Berenice Scale:     For the purpose of the NCDR LAAO Registry, I conducted the  Preop Modified [place] Scale over the phone.  The results are below:  Slight Disability- Uses a cane  & sometimes a walker

## 2024-05-08 ENCOUNTER — SCHEDULED TELEPHONE ENCOUNTER (OUTPATIENT)
Dept: SLEEP CENTER | Age: 89
End: 2024-05-08
Payer: MEDICARE

## 2024-05-08 DIAGNOSIS — G47.31 COMPLEX SLEEP APNEA SYNDROME: Primary | ICD-10-CM

## 2024-05-08 DIAGNOSIS — G47.31 CENTRAL SLEEP APNEA: ICD-10-CM

## 2024-05-08 PROCEDURE — 99441 PR PHYS/QHP TELEPHONE EVALUATION 5-10 MIN: CPT | Performed by: NURSE PRACTITIONER

## 2024-05-13 SDOH — ECONOMIC STABILITY: FOOD INSECURITY: WITHIN THE PAST 12 MONTHS, THE FOOD YOU BOUGHT JUST DIDN'T LAST AND YOU DIDN'T HAVE MONEY TO GET MORE.: NEVER TRUE

## 2024-05-13 SDOH — ECONOMIC STABILITY: FOOD INSECURITY: WITHIN THE PAST 12 MONTHS, YOU WORRIED THAT YOUR FOOD WOULD RUN OUT BEFORE YOU GOT MONEY TO BUY MORE.: NEVER TRUE

## 2024-05-13 SDOH — ECONOMIC STABILITY: INCOME INSECURITY: HOW HARD IS IT FOR YOU TO PAY FOR THE VERY BASICS LIKE FOOD, HOUSING, MEDICAL CARE, AND HEATING?: NOT HARD AT ALL

## 2024-05-14 LAB
INR BLD: 1.1
PROTIME: NORMAL

## 2024-05-15 DIAGNOSIS — Z91.89 AT HIGH RISK FOR BLEEDING: ICD-10-CM

## 2024-05-15 DIAGNOSIS — I48.19 PERSISTENT ATRIAL FIBRILLATION (HCC): ICD-10-CM

## 2024-05-16 ENCOUNTER — OFFICE VISIT (OUTPATIENT)
Dept: PRIMARY CARE CLINIC | Age: 89
End: 2024-05-16

## 2024-05-16 VITALS
HEART RATE: 96 BPM | HEIGHT: 67 IN | OXYGEN SATURATION: 98 % | DIASTOLIC BLOOD PRESSURE: 70 MMHG | SYSTOLIC BLOOD PRESSURE: 128 MMHG | WEIGHT: 206 LBS | TEMPERATURE: 97.5 F | BODY MASS INDEX: 32.33 KG/M2

## 2024-05-16 DIAGNOSIS — Z71.89 ACP (ADVANCE CARE PLANNING): ICD-10-CM

## 2024-05-16 DIAGNOSIS — I48.0 PAROXYSMAL ATRIAL FIBRILLATION (HCC): ICD-10-CM

## 2024-05-16 DIAGNOSIS — E78.2 MIXED HYPERLIPIDEMIA: ICD-10-CM

## 2024-05-16 DIAGNOSIS — I48.91 ATRIAL FIBRILLATION AND FLUTTER (HCC): ICD-10-CM

## 2024-05-16 DIAGNOSIS — I48.92 ATRIAL FIBRILLATION AND FLUTTER (HCC): ICD-10-CM

## 2024-05-16 DIAGNOSIS — G62.9 NEUROPATHY: ICD-10-CM

## 2024-05-16 DIAGNOSIS — R73.02 GLUCOSE INTOLERANCE (IMPAIRED GLUCOSE TOLERANCE): Primary | ICD-10-CM

## 2024-05-16 DIAGNOSIS — G47.33 OSA (OBSTRUCTIVE SLEEP APNEA): ICD-10-CM

## 2024-05-16 LAB — HBA1C MFR BLD: 5.5 %

## 2024-05-16 RX ORDER — PREGABALIN 50 MG/1
50 CAPSULE ORAL 3 TIMES DAILY
Qty: 90 CAPSULE | Refills: 3 | Status: SHIPPED | OUTPATIENT
Start: 2024-05-16 | End: 2024-09-13

## 2024-05-16 RX ORDER — METOLAZONE 2.5 MG/1
2.5 TABLET ORAL DAILY
COMMUNITY

## 2024-05-16 SDOH — ECONOMIC STABILITY: FOOD INSECURITY: WITHIN THE PAST 12 MONTHS, YOU WORRIED THAT YOUR FOOD WOULD RUN OUT BEFORE YOU GOT MONEY TO BUY MORE.: NEVER TRUE

## 2024-05-16 SDOH — ECONOMIC STABILITY: INCOME INSECURITY: HOW HARD IS IT FOR YOU TO PAY FOR THE VERY BASICS LIKE FOOD, HOUSING, MEDICAL CARE, AND HEATING?: NOT HARD AT ALL

## 2024-05-16 SDOH — ECONOMIC STABILITY: FOOD INSECURITY: WITHIN THE PAST 12 MONTHS, THE FOOD YOU BOUGHT JUST DIDN'T LAST AND YOU DIDN'T HAVE MONEY TO GET MORE.: NEVER TRUE

## 2024-05-16 NOTE — PATIENT INSTRUCTIONS

## 2024-05-17 ENCOUNTER — TELEPHONE (OUTPATIENT)
Age: 89
End: 2024-05-17

## 2024-05-18 ENCOUNTER — ANESTHESIA EVENT (OUTPATIENT)
Age: 89
DRG: 274 | End: 2024-05-18
Payer: MEDICARE

## 2024-05-20 ENCOUNTER — HOSPITAL ENCOUNTER (INPATIENT)
Age: 89
LOS: 2 days | Discharge: HOME | DRG: 274 | End: 2024-05-22
Attending: INTERNAL MEDICINE | Admitting: INTERNAL MEDICINE
Payer: MEDICARE

## 2024-05-20 ENCOUNTER — ANESTHESIA (OUTPATIENT)
Age: 89
DRG: 274 | End: 2024-05-20
Payer: MEDICARE

## 2024-05-20 ENCOUNTER — APPOINTMENT (OUTPATIENT)
Age: 89
DRG: 274 | End: 2024-05-20
Attending: INTERNAL MEDICINE
Payer: MEDICARE

## 2024-05-20 DIAGNOSIS — Z91.89 AT HIGH RISK FOR BLEEDING: ICD-10-CM

## 2024-05-20 DIAGNOSIS — I48.19 PERSISTENT ATRIAL FIBRILLATION (HCC): ICD-10-CM

## 2024-05-20 DIAGNOSIS — Z95.818 PRESENCE OF WATCHMAN LEFT ATRIAL APPENDAGE CLOSURE DEVICE: Primary | ICD-10-CM

## 2024-05-20 DIAGNOSIS — I48.11 LONGSTANDING PERSISTENT ATRIAL FIBRILLATION (HCC): ICD-10-CM

## 2024-05-20 LAB
ABO + RH BLD: NORMAL
ACTIVATED CLOTTING TIME, LOW RANGE: 299 SEC
ACTIVATED CLOTTING TIME, LOW RANGE: 306 SEC
ACTIVATED CLOTTING TIME, LOW RANGE: 368 SEC
ACTIVATED CLOTTING TIME, LOW RANGE: >400 SEC
ARM BAND NUMBER: NORMAL
BLOOD BANK SAMPLE EXPIRATION: NORMAL
BLOOD GROUP ANTIBODIES SERPL: NEGATIVE
ECHO BSA: 2.09 M2
ECHO BSA: 2.09 M2

## 2024-05-20 PROCEDURE — 99223 1ST HOSP IP/OBS HIGH 75: CPT | Performed by: INTERNAL MEDICINE

## 2024-05-20 PROCEDURE — 2700000000 HC OXYGEN THERAPY PER DAY

## 2024-05-20 PROCEDURE — 6360000004 HC RX CONTRAST MEDICATION: Performed by: INTERNAL MEDICINE

## 2024-05-20 PROCEDURE — 02L73DK OCCLUSION OF LEFT ATRIAL APPENDAGE WITH INTRALUMINAL DEVICE, PERCUTANEOUS APPROACH: ICD-10-PCS | Performed by: INTERNAL MEDICINE

## 2024-05-20 PROCEDURE — 7100000000 HC PACU RECOVERY - FIRST 15 MIN: Performed by: INTERNAL MEDICINE

## 2024-05-20 PROCEDURE — C1894 INTRO/SHEATH, NON-LASER: HCPCS | Performed by: INTERNAL MEDICINE

## 2024-05-20 PROCEDURE — 6360000002 HC RX W HCPCS: Performed by: NURSE ANESTHETIST, CERTIFIED REGISTERED

## 2024-05-20 PROCEDURE — C1769 GUIDE WIRE: HCPCS | Performed by: INTERNAL MEDICINE

## 2024-05-20 PROCEDURE — 85347 COAGULATION TIME ACTIVATED: CPT

## 2024-05-20 PROCEDURE — 6370000000 HC RX 637 (ALT 250 FOR IP): Performed by: INTERNAL MEDICINE

## 2024-05-20 PROCEDURE — 2140000000 HC CCU INTERMEDIATE R&B

## 2024-05-20 PROCEDURE — 2500000003 HC RX 250 WO HCPCS: Performed by: INTERNAL MEDICINE

## 2024-05-20 PROCEDURE — 7100000001 HC PACU RECOVERY - ADDTL 15 MIN: Performed by: INTERNAL MEDICINE

## 2024-05-20 PROCEDURE — 86850 RBC ANTIBODY SCREEN: CPT

## 2024-05-20 PROCEDURE — 3700000000 HC ANESTHESIA ATTENDED CARE: Performed by: INTERNAL MEDICINE

## 2024-05-20 PROCEDURE — C1889 IMPLANT/INSERT DEVICE, NOC: HCPCS | Performed by: INTERNAL MEDICINE

## 2024-05-20 PROCEDURE — 2580000003 HC RX 258: Performed by: INTERNAL MEDICINE

## 2024-05-20 PROCEDURE — 33340 PERQ CLSR TCAT L ATR APNDGE: CPT | Performed by: INTERNAL MEDICINE

## 2024-05-20 PROCEDURE — 6360000002 HC RX W HCPCS: Performed by: INTERNAL MEDICINE

## 2024-05-20 PROCEDURE — 93355 ECHO TRANSESOPHAGEAL (TEE): CPT | Performed by: INTERNAL MEDICINE

## 2024-05-20 PROCEDURE — 2580000003 HC RX 258: Performed by: NURSE ANESTHETIST, CERTIFIED REGISTERED

## 2024-05-20 PROCEDURE — C1760 CLOSURE DEV, VASC: HCPCS | Performed by: INTERNAL MEDICINE

## 2024-05-20 PROCEDURE — 93005 ELECTROCARDIOGRAM TRACING: CPT | Performed by: INTERNAL MEDICINE

## 2024-05-20 PROCEDURE — 2500000003 HC RX 250 WO HCPCS: Performed by: NURSE ANESTHETIST, CERTIFIED REGISTERED

## 2024-05-20 PROCEDURE — 2709999900 HC NON-CHARGEABLE SUPPLY: Performed by: INTERNAL MEDICINE

## 2024-05-20 PROCEDURE — C1893 INTRO/SHEATH, FIXED,NON-PEEL: HCPCS | Performed by: INTERNAL MEDICINE

## 2024-05-20 PROCEDURE — 3700000001 HC ADD 15 MINUTES (ANESTHESIA): Performed by: INTERNAL MEDICINE

## 2024-05-20 PROCEDURE — 86900 BLOOD TYPING SEROLOGIC ABO: CPT

## 2024-05-20 PROCEDURE — 86901 BLOOD TYPING SEROLOGIC RH(D): CPT

## 2024-05-20 PROCEDURE — 93355 ECHO TRANSESOPHAGEAL (TEE): CPT

## 2024-05-20 PROCEDURE — B24BZZ4 ULTRASONOGRAPHY OF HEART WITH AORTA, TRANSESOPHAGEAL: ICD-10-PCS | Performed by: INTERNAL MEDICINE

## 2024-05-20 DEVICE — LEFT ATRIAL APPENDAGE CLOSURE DEVICE WITH DELIVERY SYSTEM
Type: IMPLANTABLE DEVICE | Status: FUNCTIONAL
Brand: WATCHMAN FLX™

## 2024-05-20 RX ORDER — MIDAZOLAM HYDROCHLORIDE 2 MG/2ML
2 INJECTION, SOLUTION INTRAMUSCULAR; INTRAVENOUS
Status: DISCONTINUED | OUTPATIENT
Start: 2024-05-20 | End: 2024-05-20 | Stop reason: HOSPADM

## 2024-05-20 RX ORDER — ENOXAPARIN SODIUM 100 MG/ML
40 INJECTION SUBCUTANEOUS DAILY
Status: DISCONTINUED | OUTPATIENT
Start: 2024-05-20 | End: 2024-05-22 | Stop reason: HOSPADM

## 2024-05-20 RX ORDER — ROCURONIUM BROMIDE 10 MG/ML
INJECTION, SOLUTION INTRAVENOUS PRN
Status: DISCONTINUED | OUTPATIENT
Start: 2024-05-20 | End: 2024-05-20 | Stop reason: SDUPTHER

## 2024-05-20 RX ORDER — CHOLECALCIFEROL (VITAMIN D3) 50 MCG
5000 TABLET ORAL DAILY
Status: DISCONTINUED | OUTPATIENT
Start: 2024-05-20 | End: 2024-05-22 | Stop reason: HOSPADM

## 2024-05-20 RX ORDER — DEXAMETHASONE SODIUM PHOSPHATE 10 MG/ML
INJECTION INTRAMUSCULAR; INTRAVENOUS PRN
Status: DISCONTINUED | OUTPATIENT
Start: 2024-05-20 | End: 2024-05-20 | Stop reason: SDUPTHER

## 2024-05-20 RX ORDER — SODIUM CHLORIDE 9 MG/ML
INJECTION, SOLUTION INTRAVENOUS PRN
Status: DISCONTINUED | OUTPATIENT
Start: 2024-05-20 | End: 2024-05-22 | Stop reason: HOSPADM

## 2024-05-20 RX ORDER — SODIUM CHLORIDE 0.9 % (FLUSH) 0.9 %
5-40 SYRINGE (ML) INJECTION PRN
Status: DISCONTINUED | OUTPATIENT
Start: 2024-05-20 | End: 2024-05-22 | Stop reason: HOSPADM

## 2024-05-20 RX ORDER — HYDROMORPHONE HYDROCHLORIDE 1 MG/ML
0.5 INJECTION, SOLUTION INTRAMUSCULAR; INTRAVENOUS; SUBCUTANEOUS EVERY 5 MIN PRN
Status: DISCONTINUED | OUTPATIENT
Start: 2024-05-20 | End: 2024-05-20 | Stop reason: HOSPADM

## 2024-05-20 RX ORDER — CALCIUM CARBONATE 500 MG/1
500 TABLET, CHEWABLE ORAL DAILY
Status: DISCONTINUED | OUTPATIENT
Start: 2024-05-20 | End: 2024-05-22 | Stop reason: HOSPADM

## 2024-05-20 RX ORDER — MEPERIDINE HYDROCHLORIDE 25 MG/ML
12.5 INJECTION INTRAMUSCULAR; INTRAVENOUS; SUBCUTANEOUS EVERY 5 MIN PRN
Status: DISCONTINUED | OUTPATIENT
Start: 2024-05-20 | End: 2024-05-20 | Stop reason: HOSPADM

## 2024-05-20 RX ORDER — ONDANSETRON 2 MG/ML
4 INJECTION INTRAMUSCULAR; INTRAVENOUS
Status: DISCONTINUED | OUTPATIENT
Start: 2024-05-20 | End: 2024-05-20 | Stop reason: HOSPADM

## 2024-05-20 RX ORDER — METOLAZONE 2.5 MG/1
2.5 TABLET ORAL DAILY
Status: DISCONTINUED | OUTPATIENT
Start: 2024-05-20 | End: 2024-05-22 | Stop reason: HOSPADM

## 2024-05-20 RX ORDER — HYDROMORPHONE HYDROCHLORIDE 1 MG/ML
0.25 INJECTION, SOLUTION INTRAMUSCULAR; INTRAVENOUS; SUBCUTANEOUS EVERY 5 MIN PRN
Status: DISCONTINUED | OUTPATIENT
Start: 2024-05-20 | End: 2024-05-20 | Stop reason: HOSPADM

## 2024-05-20 RX ORDER — CLOPIDOGREL 300 MG/1
600 TABLET, FILM COATED ORAL ONCE
Status: COMPLETED | OUTPATIENT
Start: 2024-05-20 | End: 2024-05-20

## 2024-05-20 RX ORDER — SODIUM CHLORIDE 0.9 % (FLUSH) 0.9 %
5-40 SYRINGE (ML) INJECTION PRN
Status: DISCONTINUED | OUTPATIENT
Start: 2024-05-20 | End: 2024-05-20 | Stop reason: HOSPADM

## 2024-05-20 RX ORDER — DROPERIDOL 2.5 MG/ML
0.62 INJECTION, SOLUTION INTRAMUSCULAR; INTRAVENOUS
Status: DISCONTINUED | OUTPATIENT
Start: 2024-05-20 | End: 2024-05-20 | Stop reason: HOSPADM

## 2024-05-20 RX ORDER — NALOXONE HYDROCHLORIDE 0.4 MG/ML
INJECTION, SOLUTION INTRAMUSCULAR; INTRAVENOUS; SUBCUTANEOUS PRN
Status: DISCONTINUED | OUTPATIENT
Start: 2024-05-20 | End: 2024-05-20 | Stop reason: HOSPADM

## 2024-05-20 RX ORDER — LANOLIN ALCOHOL/MO/W.PET/CERES
250 CREAM (GRAM) TOPICAL DAILY
Status: DISCONTINUED | OUTPATIENT
Start: 2024-05-20 | End: 2024-05-22 | Stop reason: HOSPADM

## 2024-05-20 RX ORDER — LABETALOL HYDROCHLORIDE 5 MG/ML
5 INJECTION, SOLUTION INTRAVENOUS
Status: DISCONTINUED | OUTPATIENT
Start: 2024-05-20 | End: 2024-05-20 | Stop reason: HOSPADM

## 2024-05-20 RX ORDER — DIPHENHYDRAMINE HYDROCHLORIDE 50 MG/ML
12.5 INJECTION INTRAMUSCULAR; INTRAVENOUS
Status: DISCONTINUED | OUTPATIENT
Start: 2024-05-20 | End: 2024-05-20 | Stop reason: HOSPADM

## 2024-05-20 RX ORDER — HYDRALAZINE HYDROCHLORIDE 20 MG/ML
5 INJECTION INTRAMUSCULAR; INTRAVENOUS
Status: DISCONTINUED | OUTPATIENT
Start: 2024-05-20 | End: 2024-05-20 | Stop reason: HOSPADM

## 2024-05-20 RX ORDER — SODIUM CHLORIDE 9 MG/ML
INJECTION, SOLUTION INTRAVENOUS CONTINUOUS PRN
Status: DISCONTINUED | OUTPATIENT
Start: 2024-05-20 | End: 2024-05-20 | Stop reason: SDUPTHER

## 2024-05-20 RX ORDER — ASPIRIN 81 MG/1
81 TABLET, CHEWABLE ORAL DAILY
Status: DISCONTINUED | OUTPATIENT
Start: 2024-05-21 | End: 2024-05-22 | Stop reason: HOSPADM

## 2024-05-20 RX ORDER — SODIUM CHLORIDE 9 MG/ML
INJECTION, SOLUTION INTRAVENOUS CONTINUOUS
Status: DISCONTINUED | OUTPATIENT
Start: 2024-05-20 | End: 2024-05-20 | Stop reason: HOSPADM

## 2024-05-20 RX ORDER — PREGABALIN 50 MG/1
50 CAPSULE ORAL 3 TIMES DAILY
Status: DISCONTINUED | OUTPATIENT
Start: 2024-05-20 | End: 2024-05-22 | Stop reason: HOSPADM

## 2024-05-20 RX ORDER — SUCCINYLCHOLINE/SOD CL,ISO/PF 200MG/10ML
SYRINGE (ML) INTRAVENOUS PRN
Status: DISCONTINUED | OUTPATIENT
Start: 2024-05-20 | End: 2024-05-20 | Stop reason: SDUPTHER

## 2024-05-20 RX ORDER — ONDANSETRON 2 MG/ML
INJECTION INTRAMUSCULAR; INTRAVENOUS PRN
Status: DISCONTINUED | OUTPATIENT
Start: 2024-05-20 | End: 2024-05-20 | Stop reason: SDUPTHER

## 2024-05-20 RX ORDER — SODIUM CHLORIDE 9 MG/ML
INJECTION, SOLUTION INTRAVENOUS PRN
Status: DISCONTINUED | OUTPATIENT
Start: 2024-05-20 | End: 2024-05-20 | Stop reason: HOSPADM

## 2024-05-20 RX ORDER — ACETAMINOPHEN 325 MG/1
650 TABLET ORAL EVERY 4 HOURS PRN
Status: DISCONTINUED | OUTPATIENT
Start: 2024-05-20 | End: 2024-05-22 | Stop reason: HOSPADM

## 2024-05-20 RX ORDER — ASPIRIN 325 MG
325 TABLET ORAL ONCE
Status: COMPLETED | OUTPATIENT
Start: 2024-05-20 | End: 2024-05-20

## 2024-05-20 RX ORDER — M-VIT,TX,IRON,MINS/CALC/FOLIC 27MG-0.4MG
1 TABLET ORAL DAILY
Status: DISCONTINUED | OUTPATIENT
Start: 2024-05-20 | End: 2024-05-22 | Stop reason: HOSPADM

## 2024-05-20 RX ORDER — HEPARIN SODIUM 1000 [USP'U]/ML
INJECTION, SOLUTION INTRAVENOUS; SUBCUTANEOUS PRN
Status: DISCONTINUED | OUTPATIENT
Start: 2024-05-20 | End: 2024-05-20 | Stop reason: SDUPTHER

## 2024-05-20 RX ORDER — CLOPIDOGREL BISULFATE 75 MG/1
75 TABLET ORAL DAILY
Status: DISCONTINUED | OUTPATIENT
Start: 2024-05-21 | End: 2024-05-22 | Stop reason: HOSPADM

## 2024-05-20 RX ORDER — PROPOFOL 10 MG/ML
INJECTION, EMULSION INTRAVENOUS PRN
Status: DISCONTINUED | OUTPATIENT
Start: 2024-05-20 | End: 2024-05-20 | Stop reason: SDUPTHER

## 2024-05-20 RX ORDER — FENTANYL CITRATE 50 UG/ML
INJECTION, SOLUTION INTRAMUSCULAR; INTRAVENOUS PRN
Status: DISCONTINUED | OUTPATIENT
Start: 2024-05-20 | End: 2024-05-20 | Stop reason: SDUPTHER

## 2024-05-20 RX ORDER — METOPROLOL SUCCINATE 25 MG/1
25 TABLET, EXTENDED RELEASE ORAL DAILY
Status: DISCONTINUED | OUTPATIENT
Start: 2024-05-20 | End: 2024-05-22 | Stop reason: HOSPADM

## 2024-05-20 RX ORDER — PROTAMINE SULFATE 10 MG/ML
INJECTION, SOLUTION INTRAVENOUS PRN
Status: DISCONTINUED | OUTPATIENT
Start: 2024-05-20 | End: 2024-05-20 | Stop reason: SDUPTHER

## 2024-05-20 RX ORDER — SODIUM CHLORIDE 0.9 % (FLUSH) 0.9 %
5-40 SYRINGE (ML) INJECTION EVERY 12 HOURS SCHEDULED
Status: DISCONTINUED | OUTPATIENT
Start: 2024-05-20 | End: 2024-05-22 | Stop reason: HOSPADM

## 2024-05-20 RX ORDER — ACETAMINOPHEN 325 MG/1
650 TABLET ORAL
Status: DISCONTINUED | OUTPATIENT
Start: 2024-05-20 | End: 2024-05-20 | Stop reason: HOSPADM

## 2024-05-20 RX ORDER — TAMSULOSIN HYDROCHLORIDE 0.4 MG/1
0.4 CAPSULE ORAL NIGHTLY
Status: DISCONTINUED | OUTPATIENT
Start: 2024-05-20 | End: 2024-05-22 | Stop reason: HOSPADM

## 2024-05-20 RX ORDER — SODIUM CHLORIDE 0.9 % (FLUSH) 0.9 %
5-40 SYRINGE (ML) INJECTION EVERY 12 HOURS SCHEDULED
Status: DISCONTINUED | OUTPATIENT
Start: 2024-05-20 | End: 2024-05-20 | Stop reason: HOSPADM

## 2024-05-20 RX ORDER — FUROSEMIDE 40 MG/1
40 TABLET ORAL DAILY
Status: DISCONTINUED | OUTPATIENT
Start: 2024-05-20 | End: 2024-05-22 | Stop reason: HOSPADM

## 2024-05-20 RX ORDER — IPRATROPIUM BROMIDE AND ALBUTEROL SULFATE 2.5; .5 MG/3ML; MG/3ML
1 SOLUTION RESPIRATORY (INHALATION)
Status: DISCONTINUED | OUTPATIENT
Start: 2024-05-20 | End: 2024-05-20 | Stop reason: HOSPADM

## 2024-05-20 RX ADMIN — SUGAMMADEX 200 MG: 100 INJECTION, SOLUTION INTRAVENOUS at 09:43

## 2024-05-20 RX ADMIN — ROCURONIUM BROMIDE 10 MG: 10 INJECTION, SOLUTION INTRAVENOUS at 08:05

## 2024-05-20 RX ADMIN — ROCURONIUM BROMIDE 30 MG: 10 INJECTION, SOLUTION INTRAVENOUS at 08:18

## 2024-05-20 RX ADMIN — HEPARIN SODIUM 10000 UNITS: 1000 INJECTION, SOLUTION INTRAVENOUS; SUBCUTANEOUS at 08:17

## 2024-05-20 RX ADMIN — WATER 2000 MG: 1 INJECTION INTRAMUSCULAR; INTRAVENOUS; SUBCUTANEOUS at 18:25

## 2024-05-20 RX ADMIN — CYANOCOBALAMIN TAB 1000 MCG 250 MCG: 1000 TAB at 14:34

## 2024-05-20 RX ADMIN — DEXAMETHASONE SODIUM PHOSPHATE 10 MG: 10 INJECTION INTRAMUSCULAR; INTRAVENOUS at 08:12

## 2024-05-20 RX ADMIN — SODIUM CHLORIDE: 9 INJECTION, SOLUTION INTRAVENOUS at 08:03

## 2024-05-20 RX ADMIN — PREGABALIN 50 MG: 50 CAPSULE ORAL at 14:33

## 2024-05-20 RX ADMIN — FUROSEMIDE 40 MG: 40 TABLET ORAL at 14:33

## 2024-05-20 RX ADMIN — PHENYLEPHRINE HYDROCHLORIDE 50 MCG: 10 INJECTION INTRAVENOUS at 08:23

## 2024-05-20 RX ADMIN — PHENYLEPHRINE HYDROCHLORIDE 100 MCG: 10 INJECTION INTRAVENOUS at 09:09

## 2024-05-20 RX ADMIN — PHENYLEPHRINE HYDROCHLORIDE 100 MCG: 10 INJECTION INTRAVENOUS at 08:16

## 2024-05-20 RX ADMIN — CLOPIDOGREL BISULFATE 600 MG: 300 TABLET, FILM COATED ORAL at 07:22

## 2024-05-20 RX ADMIN — CALCIUM CARBONATE 500 MG: 500 TABLET, CHEWABLE ORAL at 14:33

## 2024-05-20 RX ADMIN — PHENYLEPHRINE HYDROCHLORIDE 100 MCG: 10 INJECTION INTRAVENOUS at 09:20

## 2024-05-20 RX ADMIN — SODIUM CHLORIDE: 9 INJECTION, SOLUTION INTRAVENOUS at 07:21

## 2024-05-20 RX ADMIN — PHENYLEPHRINE HYDROCHLORIDE 100 MCG: 10 INJECTION INTRAVENOUS at 08:14

## 2024-05-20 RX ADMIN — SODIUM CHLORIDE, PRESERVATIVE FREE 10 ML: 5 INJECTION INTRAVENOUS at 22:11

## 2024-05-20 RX ADMIN — PHENYLEPHRINE HYDROCHLORIDE 100 MCG: 10 INJECTION INTRAVENOUS at 08:48

## 2024-05-20 RX ADMIN — PHENYLEPHRINE HYDROCHLORIDE 100 MCG: 10 INJECTION INTRAVENOUS at 09:40

## 2024-05-20 RX ADMIN — PROTAMINE SULFATE 50 MG: 10 INJECTION, SOLUTION INTRAVENOUS at 09:41

## 2024-05-20 RX ADMIN — TAMSULOSIN HYDROCHLORIDE 0.4 MG: 0.4 CAPSULE ORAL at 22:10

## 2024-05-20 RX ADMIN — Medication 1 TABLET: at 14:33

## 2024-05-20 RX ADMIN — CEFAZOLIN 2000 MG: 2 INJECTION, POWDER, FOR SOLUTION INTRAMUSCULAR; INTRAVENOUS at 07:22

## 2024-05-20 RX ADMIN — ASPIRIN 325 MG: 325 TABLET ORAL at 07:22

## 2024-05-20 RX ADMIN — FENTANYL CITRATE 100 MCG: 50 INJECTION, SOLUTION INTRAMUSCULAR; INTRAVENOUS at 08:05

## 2024-05-20 RX ADMIN — PROPOFOL 130 MG: 10 INJECTION, EMULSION INTRAVENOUS at 08:05

## 2024-05-20 RX ADMIN — Medication 140 MG: at 08:05

## 2024-05-20 RX ADMIN — Medication 5000 UNITS: at 14:33

## 2024-05-20 RX ADMIN — PHENYLEPHRINE HYDROCHLORIDE 100 MCG: 10 INJECTION INTRAVENOUS at 08:53

## 2024-05-20 RX ADMIN — PHENYLEPHRINE HYDROCHLORIDE 100 MCG: 10 INJECTION INTRAVENOUS at 08:40

## 2024-05-20 RX ADMIN — METOLAZONE 2.5 MG: 2.5 TABLET ORAL at 14:33

## 2024-05-20 RX ADMIN — PHENYLEPHRINE HYDROCHLORIDE 100 MCG: 10 INJECTION INTRAVENOUS at 08:25

## 2024-05-20 RX ADMIN — PHENYLEPHRINE HYDROCHLORIDE 100 MCG: 10 INJECTION INTRAVENOUS at 09:02

## 2024-05-20 RX ADMIN — PREGABALIN 50 MG: 50 CAPSULE ORAL at 22:13

## 2024-05-20 RX ADMIN — ONDANSETRON HYDROCHLORIDE 4 MG: 2 INJECTION, SOLUTION INTRAMUSCULAR; INTRAVENOUS at 09:39

## 2024-05-20 RX ADMIN — PHENYLEPHRINE HYDROCHLORIDE 100 MCG: 10 INJECTION INTRAVENOUS at 09:34

## 2024-05-20 RX ADMIN — PHENYLEPHRINE HYDROCHLORIDE 100 MCG: 10 INJECTION INTRAVENOUS at 09:27

## 2024-05-20 ASSESSMENT — PAIN - FUNCTIONAL ASSESSMENT: PAIN_FUNCTIONAL_ASSESSMENT: NONE - DENIES PAIN

## 2024-05-20 ASSESSMENT — LIFESTYLE VARIABLES: SMOKING_STATUS: 0

## 2024-05-20 ASSESSMENT — PAIN SCALES - GENERAL: PAINLEVEL_OUTOF10: 0

## 2024-05-20 NOTE — PROGRESS NOTES
Patient transferred to floor on bed in stable condition with ancillary staff. Patient transferred on 3lnc and telemetry monitor with patient belongings and cane on bed.

## 2024-05-20 NOTE — PROGRESS NOTES
4 Eyes Skin Assessment     NAME:  Deyvi Rose  YOB: 1934  MEDICAL RECORD NUMBER:  [id number]    The patient is being assessed for  Admission    I agree that at least one RN has performed a thorough Head to Toe Skin Assessment on the patient. ALL assessment sites listed below have been assessed.      Areas assessed by both nurses:    Head, Face, Ears, Shoulders, Back, Chest, Arms, Elbows, Hands, Sacrum. Buttock, Coccyx, Ischium, Legs. Feet and Heels, and Under Medical Devices         Does the Patient have a Wound? Yes wound(s) were present on assessment. LDA wound assessment was Initiated and completed by RN       Melchor Prevention initiated by RN: No  Wound Care Orders initiated by RN: Yes    Pressure Injury (Stage 3,4, Unstageable, DTI, NWPT, and Complex wounds) if present, place Wound referral order by RN under : No    New Ostomies, if present place, Ostomy referral order under : No     Nurse 1 eSignature: Electronically signed by Veronica Fuller RN on 5/20/24 at 7:02 PM EDT    **SHARE this note so that the co-signing nurse can place an eSignature**    Nurse 2 eSignature: Electronically signed by Emperatriz Callejas RN on 5/20/24 at 7:38 PM EDT

## 2024-05-20 NOTE — PROGRESS NOTES
RN called and left a message for Dr. Bates about patient having continued oozing from right groin site. Order for quick clot and reassess.

## 2024-05-20 NOTE — NURSE NAVIGATOR
[facility] OP Education & DC Instructions    I met with  Deyvi to review the [facility] DC instructions    Reviewed everything on the After Visit Summary: activity, incision care, antibiotic prophylaxis, 45 Day FU CHEKO appt, FU appt w/ Dr. Bates     Reviewed Plavix & ASA 81 regimen & side effects.  Stress the importance of compliance       Reviewed Planned Antithrombotic Medications Strategy:     Handouts given on above topics. All questions answered.    I also called his wife Debbie & reviewed the above    Time spent with patient 15 minutes.

## 2024-05-20 NOTE — PROGRESS NOTES
RN called and spoke with Dr. Bates about patient saturating quick clot. Order for pressure dressing to be applied.

## 2024-05-20 NOTE — ANESTHESIA PROCEDURE NOTES
Arterial Line:    An arterial line was placed using surface landmarks, in the holding area for the following indication(s): continuous blood pressure monitoring and blood sampling needed.    A 20 gauge (size), 1 and 3/8 inch (length), Arrow (type) catheter was placed, Seldinger technique not used, into the right radial artery, secured by tape and Tegaderm.  Anesthesia type: Local  Local infiltration: Injection    Events:  patient tolerated procedure well with no complications.5/20/2024 7:30 AM  Resident/CRNA: Dianne Park APRN - CRNA  Performed: Resident/CRNA   Preanesthetic Checklist  Completed: patient identified, IV checked, site marked, risks and benefits discussed, surgical/procedural consents, equipment checked, pre-op evaluation, timeout performed, anesthesia consent given, oxygen available and monitors applied/VS acknowledged

## 2024-05-20 NOTE — ANESTHESIA POSTPROCEDURE EVALUATION
Department of Anesthesiology  Postprocedure Note    Patient: Deyvi Rose  MRN: [id number]  YOB: 1934  Date of evaluation: 5/20/2024    Procedure Summary       Date: 05/20/24 [place] [facility] [place] [place] [facility]    Anesthesia Start: 0710 Anesthesia Stop: 1001    Procedure: Watchman nael closure device Diagnosis:       Longstanding persistent atrial fibrillation (HCC)      (Afib)    Providers: Dequan Bates MD Responsible Provider: Princess Quinones MD    Anesthesia Type: general ASA Status: 3            Anesthesia Type: No value filed.    Marcell Phase I: Marcell Score: 9    Marcell Phase II:      Anesthesia Post Evaluation    Patient location during evaluation: PACU  Patient participation: complete - patient participated  Level of consciousness: awake and alert  Airway patency: patent  Nausea & Vomiting: no nausea and no vomiting  Cardiovascular status: blood pressure returned to baseline and hemodynamically stable  Respiratory status: acceptable and spontaneous ventilation  Hydration status: euvolemic  Multimodal analgesia pain management approach  Pain management: adequate    There were no known notable events for this encounter.

## 2024-05-20 NOTE — FLOWSHEET NOTE
Patient arrived to the unit with the following belongings:   05/20/24 1229   Belongings   Dental Appliances Uppers;Lowers;At bedside   Vision - Corrective Lenses None   Hearing Aid Bilateral hearing aids;At bedside   Clothing Footwear;Jacket/Coat;Pants;Shirt;Socks;Undergarments;At bedside   Jewelry None   Body Piercings Removed N/A   Electronic Devices None   Weapons (Notify Protective Services/Security) None   Other Valuables Cane;At bedside   Home Medications None   Valuables Given To Patient   Provide Name(s) of Who Valuable(s) Were Given To Deyvi

## 2024-05-20 NOTE — DISCHARGE INSTRUCTIONS
Dr Bates's [facility] Instructions    Restrictions  No driving for 3 days  Lifting, push, pull restrictions max of 10 lbs. for 1 week  No strenuous activity for 1 week.   No stairs for 24 hours. Minimal walking today.  No driving, working, or sexual activity for 48 hours.  Splint procedure site with activity today such as: Changing Positions, Coughing,  Sneezing, Laughing  You may shower after 24 hours  Soak dressing with water in shower and gently peel off. Cleanse in shower gently with soap and water. Cover with band aid for additional 24 hours then remove.   Keep the incision area clean and dry (Do not scrub the area.)   You need use a Band-Aid over the incision if you wish to, however, change it every day  Do not use creams, lotions, or ointments on the incision site   Look at the area daily to make sure it is healing properly  If you notice any of the signs of infection please call your doctor  Bruising can occur and will resolve over time.  No tub bathing or swimming for 1 week.  Palpitations are common post procedure and will resolve over time. If they sustain or you feel lightheaded or dizzy with them, call Dr. Clay's office immediately.     Traveling Home          For your safety, a responsible adult must drive you home the day of discharge.       Call your physician if you notice:    Please call us right away if you have any of these signs of infection:  Increased drainage,pain,  bleeding, or oozing from the incision site (If this develops please apply direct pressure to site.)  Increased opening of the incision  Redness, swelling, or warmth around the incision site  Increased body temperature (greater than 101° F or 38.4° C)  Fever, shaking or chills, and/or dizziness  Numbness, tingling or cramping in the procedure leg at rest or with activity.  Heart palpitations (sensation of fast or hard beating from your heart)  If you gain more than 2 lbs. overnight or 4 pounds in a week  Also call your provider

## 2024-05-20 NOTE — PROGRESS NOTES
Floor Called, nurse to nurse given. Spoke with Radha MARTINEZ . Patients test results review, VS reported to receiving nurse. Any and all important information regarding patient disclosed.

## 2024-05-20 NOTE — PLAN OF CARE
Problem: Chronic Conditions and Co-morbidities  Goal: Patient's chronic conditions and co-morbidity symptoms are monitored and maintained or improved  Outcome: Progressing     Problem: Discharge Planning  Goal: Discharge to home or other facility with appropriate resources  Outcome: Progressing     Problem: Safety - Adult  Goal: Free from fall injury  Outcome: Progressing     Problem: ABCDS Injury Assessment  Goal: Absence of physical injury  Outcome: Progressing

## 2024-05-20 NOTE — H&P
STRUCTURAL CARDIOLOGY INPATIENT HISTORY AND PHYSICAL EXAMINATION NOTE       PATIENT DEMOGRAPHICS:       NAME: Deyvi Rose   YOB: 1934   AGE: 89 y.o.   MEDICAL RECORD NUMBER: [id number]       ADMISSION INFORMATION:      DATE OF ADMISSION: 5/20/2024       PRINCIPLE DIAGNOSIS:?Atrial fibrillation?   PLANNED PROCEDURE: Left Atrial Appendage Occlusion Device (Watchman)     CARE TEAM MEMBERS   PCP : Heather Ascencio MD?   PRIMARY CARDIOLOGIST: Dr. Laughlin    REFERRING PROVIDER: Heather Ascencio MD         HISTORY OF PRESENT ILLNESS:        Deyvi Rose IS a 89 y.o. male referred by Heather Alejandro  who presents to the hospital today for planned Left Atrial Appendage Occlusion Device (Watchman). Past medical history includes HTN, HLD, sleep apnea, obesity, persistent atrial fibrillation (failed multiple DCCV attempts, even with Amiodarone), frequent falls, HFpEF and BPH      The patient was seen in structural clinic on 3/7/24. Per last clinic note:     Patient reports that he has been having frequent falls, at least twice a month.  He denies having dizziness or syncope.  No balance issues.  He is unsure why he falls.  He just finds himself on the ground.  Patient reports that he had a fall about 5 years ago when he hit his head requiring stitches of his forehead.  He reports easy bleeding and he has significant wound over his left elbow from a bleed that happened after a fall.  He also reports that he gets scratches over his skin from his playful dog and that causes him to bleed.  Denies having GI bleed. Denies having CP, SOB.      Today, he presents for his procedure. He reports that he had his last fall about a month ago when he fell on his knee and he is wearing a left knee brace.     Shared decision has been made between the patient, our team, and Dr. Ascencio       PAST HISTORY:   Past Medical History:   Diagnosis Date    Arthritis     Atrial fibrillation, chronic (HCC)     GERD (gastroesophageal

## 2024-05-20 NOTE — ANESTHESIA PRE PROCEDURE
Department of Anesthesiology  Preprocedure Note       Name:  Deyvi Rose   Age:  89 y.o.  :  1934                                          MRN:  [id number]         Date:  2024      Surgeon: Surgeon(s):  Dequan Bates MD    Procedure: Procedure(s):  Watchman nael closure device    Medications prior to admission:   Prior to Admission medications    Medication Sig Start Date End Date Taking? Authorizing Provider   pregabalin (LYRICA) 50 MG capsule Take 1 capsule by mouth 3 times daily for 120 days. Max Daily Amount: 150 mg 24  Heather Ascencio MD   metOLazone (ZAROXOLYN) 2.5 MG tablet Take 1 tablet by mouth daily    Saran Tena MD   Multiple Vitamins-Minerals (THERAPEUTIC MULTIVITAMIN-MINERALS) tablet Take 1 tablet by mouth daily    Saran Tena MD   calcium carbonate 600 MG TABS tablet Take 1 tablet by mouth daily    Saran Tena MD   vitamin D (CHOLECALCIFEROL) 125 MCG (5000 UT) CAPS capsule Take 1 capsule by mouth daily    Saran Tena MD   furosemide (LASIX) 40 MG tablet Take 1 tablet by mouth daily Take 40mg every other day and on the other days take 20mg. 3/12/24   Heather Ascencio MD   apixaban (ELIQUIS) 5 MG TABS tablet Take 1 tablet by mouth 2 times daily 24   Heather Ascencio MD   tamsulosin (FLOMAX) 0.4 MG capsule Take 1 capsule by mouth at bedtime 24   Heather Ascencio MD   metoprolol succinate (TOPROL XL) 25 MG extended release tablet Take 1 tablet by mouth daily 23   Elida Laughlin MD   vitamin B-12 (CYANOCOBALAMIN) 250 MCG tablet Take 1 tablet by mouth daily    Saran Tena MD       Current medications:    No current facility-administered medications for this encounter.       Allergies:    Allergies   Allergen Reactions    Yellow Jacket Venom Swelling       Problem List:    Patient Active Problem List   Diagnosis Code    Gouty arthritis of toe of right foot M10.9    Hallux limitus, right M20.5X1    Sleep disorder,

## 2024-05-21 ENCOUNTER — APPOINTMENT (OUTPATIENT)
Age: 89
DRG: 274 | End: 2024-05-21
Attending: INTERNAL MEDICINE
Payer: MEDICARE

## 2024-05-21 DIAGNOSIS — D72.829 LEUKOCYTOSIS, UNSPECIFIED TYPE: Primary | ICD-10-CM

## 2024-05-21 LAB
ANION GAP SERPL CALCULATED.3IONS-SCNC: 20 MMOL/L (ref 7–16)
BASOPHILS # BLD: 0 K/UL (ref 0–0.2)
BASOPHILS NFR BLD: 0 % (ref 0–2)
BUN SERPL-MCNC: 36 MG/DL (ref 6–23)
CALCIUM SERPL-MCNC: 9.7 MG/DL (ref 8.6–10.2)
CHLORIDE SERPL-SCNC: 96 MMOL/L (ref 98–107)
CO2 SERPL-SCNC: 26 MMOL/L (ref 22–29)
CREAT SERPL-MCNC: 1.1 MG/DL (ref 0.7–1.2)
ECHO BSA: 2.09 M2
ECHO LA DIAMETER INDEX: 2.21 CM/M2
ECHO LA DIAMETER: 4.5 CM
ECHO LV FRACTIONAL SHORTENING: 31 % (ref 28–44)
ECHO LV INTERNAL DIMENSION DIASTOLE INDEX: 2.06 CM/M2
ECHO LV INTERNAL DIMENSION DIASTOLIC: 4.2 CM (ref 4.2–5.9)
ECHO LV INTERNAL DIMENSION SYSTOLIC INDEX: 1.42 CM/M2
ECHO LV INTERNAL DIMENSION SYSTOLIC: 2.9 CM
ECHO LV IVSD: 1.2 CM (ref 0.6–1)
ECHO LV IVSS: 1.3 CM
ECHO LV MASS 2D: 157.1 G (ref 88–224)
ECHO LV MASS INDEX 2D: 77 G/M2 (ref 49–115)
ECHO LV POSTERIOR WALL DIASTOLIC: 1 CM (ref 0.6–1)
ECHO LV POSTERIOR WALL SYSTOLIC: 1.2 CM
ECHO LV RELATIVE WALL THICKNESS RATIO: 0.48
ECHO RV INTERNAL DIMENSION: 3.1 CM
EKG ATRIAL RATE: 340 BPM
EKG Q-T INTERVAL: 448 MS
EKG QRS DURATION: 94 MS
EKG QTC CALCULATION (BAZETT): 500 MS
EKG R AXIS: -18 DEGREES
EKG T AXIS: -18 DEGREES
EKG VENTRICULAR RATE: 75 BPM
EOSINOPHIL # BLD: 0 K/UL (ref 0.05–0.5)
EOSINOPHILS RELATIVE PERCENT: 0 % (ref 0–6)
ERYTHROCYTE [DISTWIDTH] IN BLOOD BY AUTOMATED COUNT: 13.8 % (ref 11.5–15)
ERYTHROCYTE [DISTWIDTH] IN BLOOD BY AUTOMATED COUNT: 13.9 % (ref 11.5–15)
GFR, ESTIMATED: 67 ML/MIN/1.73M2
GLUCOSE SERPL-MCNC: 129 MG/DL (ref 74–99)
HCT VFR BLD AUTO: 42.3 % (ref 37–54)
HCT VFR BLD AUTO: 43.1 % (ref 37–54)
HGB BLD-MCNC: 13.3 G/DL (ref 12.5–16.5)
HGB BLD-MCNC: 13.6 G/DL (ref 12.5–16.5)
LYMPHOCYTES NFR BLD: 0.9 K/UL (ref 1.5–4)
LYMPHOCYTES RELATIVE PERCENT: 5 % (ref 20–42)
MCH RBC QN AUTO: 27.8 PG (ref 26–35)
MCH RBC QN AUTO: 27.8 PG (ref 26–35)
MCHC RBC AUTO-ENTMCNC: 31.4 G/DL (ref 32–34.5)
MCHC RBC AUTO-ENTMCNC: 31.6 G/DL (ref 32–34.5)
MCV RBC AUTO: 88 FL (ref 80–99.9)
MCV RBC AUTO: 88.3 FL (ref 80–99.9)
MONOCYTES NFR BLD: 1.35 K/UL (ref 0.1–0.95)
MONOCYTES NFR BLD: 8 % (ref 2–12)
NEUTROPHILS NFR BLD: 87 % (ref 43–80)
NEUTS SEG NFR BLD: 14.96 K/UL (ref 1.8–7.3)
PLATELET, FLUORESCENCE: 161 K/UL (ref 130–450)
PLATELET, FLUORESCENCE: 164 K/UL (ref 130–450)
PMV BLD AUTO: 13.9 FL (ref 7–12)
PMV BLD AUTO: 14 FL (ref 7–12)
POTASSIUM SERPL-SCNC: 3.4 MMOL/L (ref 3.5–5)
RBC # BLD AUTO: 4.79 M/UL (ref 3.8–5.8)
RBC # BLD AUTO: 4.9 M/UL (ref 3.8–5.8)
RBC # BLD: NORMAL 10*6/UL
SODIUM SERPL-SCNC: 142 MMOL/L (ref 132–146)
WBC OTHER # BLD: 17.2 K/UL (ref 4.5–11.5)
WBC OTHER # BLD: 18 K/UL (ref 4.5–11.5)

## 2024-05-21 PROCEDURE — 93308 TTE F-UP OR LMTD: CPT

## 2024-05-21 PROCEDURE — 85027 COMPLETE CBC AUTOMATED: CPT

## 2024-05-21 PROCEDURE — 6360000002 HC RX W HCPCS: Performed by: INTERNAL MEDICINE

## 2024-05-21 PROCEDURE — 80048 BASIC METABOLIC PNL TOTAL CA: CPT

## 2024-05-21 PROCEDURE — 36415 COLL VENOUS BLD VENIPUNCTURE: CPT

## 2024-05-21 PROCEDURE — 6370000000 HC RX 637 (ALT 250 FOR IP): Performed by: INTERNAL MEDICINE

## 2024-05-21 PROCEDURE — 2580000003 HC RX 258: Performed by: INTERNAL MEDICINE

## 2024-05-21 PROCEDURE — 85025 COMPLETE CBC W/AUTO DIFF WBC: CPT

## 2024-05-21 PROCEDURE — 93308 TTE F-UP OR LMTD: CPT | Performed by: INTERNAL MEDICINE

## 2024-05-21 PROCEDURE — 93010 ELECTROCARDIOGRAM REPORT: CPT | Performed by: INTERNAL MEDICINE

## 2024-05-21 PROCEDURE — 2140000000 HC CCU INTERMEDIATE R&B

## 2024-05-21 RX ORDER — ASPIRIN 81 MG/1
81 TABLET, CHEWABLE ORAL DAILY
Qty: 90 TABLET | Refills: 3 | Status: SHIPPED | OUTPATIENT
Start: 2024-05-22

## 2024-05-21 RX ORDER — CLOPIDOGREL BISULFATE 75 MG/1
75 TABLET ORAL DAILY
Qty: 90 TABLET | Refills: 1 | Status: SHIPPED | OUTPATIENT
Start: 2024-05-22

## 2024-05-21 RX ORDER — POTASSIUM CHLORIDE 20 MEQ/1
40 TABLET, EXTENDED RELEASE ORAL ONCE
Status: COMPLETED | OUTPATIENT
Start: 2024-05-21 | End: 2024-05-21

## 2024-05-21 RX ADMIN — Medication 1 TABLET: at 08:17

## 2024-05-21 RX ADMIN — PREGABALIN 50 MG: 50 CAPSULE ORAL at 13:18

## 2024-05-21 RX ADMIN — SODIUM CHLORIDE, PRESERVATIVE FREE 10 ML: 5 INJECTION INTRAVENOUS at 20:57

## 2024-05-21 RX ADMIN — PREGABALIN 50 MG: 50 CAPSULE ORAL at 20:56

## 2024-05-21 RX ADMIN — POTASSIUM CHLORIDE 40 MEQ: 1500 TABLET, EXTENDED RELEASE ORAL at 10:09

## 2024-05-21 RX ADMIN — FUROSEMIDE 40 MG: 40 TABLET ORAL at 08:19

## 2024-05-21 RX ADMIN — METOLAZONE 2.5 MG: 2.5 TABLET ORAL at 08:18

## 2024-05-21 RX ADMIN — PREGABALIN 50 MG: 50 CAPSULE ORAL at 08:19

## 2024-05-21 RX ADMIN — METOPROLOL SUCCINATE 25 MG: 25 TABLET, EXTENDED RELEASE ORAL at 08:18

## 2024-05-21 RX ADMIN — WATER 2000 MG: 1 INJECTION INTRAMUSCULAR; INTRAVENOUS; SUBCUTANEOUS at 05:12

## 2024-05-21 RX ADMIN — Medication 5000 UNITS: at 08:20

## 2024-05-21 RX ADMIN — TAMSULOSIN HYDROCHLORIDE 0.4 MG: 0.4 CAPSULE ORAL at 20:57

## 2024-05-21 RX ADMIN — ASPIRIN 81 MG: 81 TABLET, CHEWABLE ORAL at 08:19

## 2024-05-21 RX ADMIN — SODIUM CHLORIDE, PRESERVATIVE FREE 10 ML: 5 INJECTION INTRAVENOUS at 08:21

## 2024-05-21 RX ADMIN — CLOPIDOGREL BISULFATE 75 MG: 75 TABLET ORAL at 08:18

## 2024-05-21 RX ADMIN — ENOXAPARIN SODIUM 40 MG: 100 INJECTION SUBCUTANEOUS at 08:21

## 2024-05-21 RX ADMIN — CYANOCOBALAMIN TAB 1000 MCG 250 MCG: 1000 TAB at 08:18

## 2024-05-21 RX ADMIN — CALCIUM CARBONATE 500 MG: 500 TABLET, CHEWABLE ORAL at 08:20

## 2024-05-21 ASSESSMENT — PAIN SCALES - GENERAL
PAINLEVEL_OUTOF10: 0

## 2024-05-21 NOTE — CARE COORDINATION
5/21:  Transition of care:  Pt presented to the Er for planned watchman device.  PT is on room air at 96% & Sq Lovenox.  Cardiology consulted.  CM spoke with pt bedside today to discuss CM role & dc planning.  Pt's PCP is Heather Ascencio & uses  [facility] [place].  Pt lives with his wife in a 2 story house with 4 steps to enter.  The bed/bathroom are on the 2nd floor with a chair lift.  PTA pt was independent with a walker/cane.  Pt has a Cpap at home.  Pt has no hx of SNF & was active with [facility].  Cm placed SADAF order.  Pt's dc plan is home & his family can transport.  Pt will keep his family informed on dc planning.  Sw/CM will continue to follow.  Electronically signed by Mary Gee RN on 5/21/2024 at 10:04 AM    Case Management Assessment  Initial Evaluation    Date/Time of Evaluation: 5/21/2024 10:05 AM  Assessment Completed by: Mary Gee RN    If patient is discharged prior to next notation, then this note serves as note for discharge by case management.    Patient Name: Deyvi Rose                   YOB: 1934  Diagnosis: Longstanding persistent atrial fibrillation (HCC) [I48.11]  Presence of Watchman left atrial appendage closure device [Z95.818]                   Date / Time: 5/20/2024  6:08 AM    Patient Admission Status: Inpatient   Readmission Risk (Low < 19, Mod (19-27), High > 27): Readmission Risk Score: 11.4    Current PCP: Heather Ascencio MD  PCP verified by CM? Yes    Chart Reviewed: Yes      History Provided by: Patient  Patient Orientation: Alert and Oriented, Person, Place, Situation    Patient Cognition: Alert    Hospitalization in the last 30 days (Readmission):  No    If yes, Readmission Assessment in  Navigator will be completed.    Advance Directives:      Code Status: Full Code   Patient's Primary Decision Maker is:      Primary Decision Maker: Kenyetta Rose - Spouse - [phone]    Secondary Decision Maker: Myra Langley - Child -

## 2024-05-21 NOTE — NURSE NAVIGATOR
Dr. Bates notified of repeat WBC of 17.2.  He ok'd his DC today.  Pt to get a repeat CBC on 5/23/24 & an early office FU w/ FRANCO Moran on 5/28/24.  I called his wife Debbie & notified her of the above.

## 2024-05-21 NOTE — ACP (ADVANCE CARE PLANNING)
Advance Care Planning   Healthcare Decision Maker:    Primary Decision Maker: Kenyetta Rose - Spouse - [phone]    Secondary Decision Maker: Funmi Gutierrezyl - Child - [phone]    Supplemental (Other) Decision Maker: Deyvi Mcgowan - Child - [phone]    Click here to complete Healthcare Decision Makers including selection of the Healthcare Decision Maker Relationship (ie \"Primary\").  Today we documented Decision Maker(s) consistent with Legal Next of Kin hierarchy.       Electronically signed by Mary Gee RN on 5/21/2024 at 10:10 AM

## 2024-05-21 NOTE — PROGRESS NOTES
Dr saucedo given update on patients c/o dizziness. States he also feels like when he stares at an object that he is moving toward the object even though he is not.

## 2024-05-21 NOTE — PROGRESS NOTES
CLINICAL PHARMACY NOTE: MEDS TO BEDS    Total # of Prescriptions Filled: 2   The following medications were delivered to the patient:  Aspirin lowdose  Clopidogrel 75 mg    Additional Documentation:     Delivered meds to patient at bedside

## 2024-05-22 VITALS
TEMPERATURE: 97.4 F | DIASTOLIC BLOOD PRESSURE: 75 MMHG | BODY MASS INDEX: 32.02 KG/M2 | RESPIRATION RATE: 18 BRPM | HEIGHT: 67 IN | OXYGEN SATURATION: 96 % | HEART RATE: 81 BPM | SYSTOLIC BLOOD PRESSURE: 141 MMHG | WEIGHT: 204 LBS

## 2024-05-22 DIAGNOSIS — Z95.818 PRESENCE OF WATCHMAN LEFT ATRIAL APPENDAGE CLOSURE DEVICE: ICD-10-CM

## 2024-05-22 DIAGNOSIS — I48.19 PERSISTENT ATRIAL FIBRILLATION (HCC): Primary | ICD-10-CM

## 2024-05-22 LAB
ANION GAP SERPL CALCULATED.3IONS-SCNC: 11 MMOL/L (ref 7–16)
BASOPHILS # BLD: 0.21 K/UL (ref 0–0.2)
BASOPHILS NFR BLD: 2 % (ref 0–2)
BUN SERPL-MCNC: 38 MG/DL (ref 6–23)
CALCIUM SERPL-MCNC: 9.3 MG/DL (ref 8.6–10.2)
CHLORIDE SERPL-SCNC: 95 MMOL/L (ref 98–107)
CO2 SERPL-SCNC: 32 MMOL/L (ref 22–29)
CREAT SERPL-MCNC: 0.9 MG/DL (ref 0.7–1.2)
EOSINOPHIL # BLD: 0.11 K/UL (ref 0.05–0.5)
EOSINOPHILS RELATIVE PERCENT: 1 % (ref 0–6)
ERYTHROCYTE [DISTWIDTH] IN BLOOD BY AUTOMATED COUNT: 14.1 % (ref 11.5–15)
GFR, ESTIMATED: 82 ML/MIN/1.73M2
GLUCOSE SERPL-MCNC: 97 MG/DL (ref 74–99)
HCT VFR BLD AUTO: 42.6 % (ref 37–54)
HGB BLD-MCNC: 13.2 G/DL (ref 12.5–16.5)
LYMPHOCYTES NFR BLD: 1.89 K/UL (ref 1.5–4)
LYMPHOCYTES RELATIVE PERCENT: 15 % (ref 20–42)
MCH RBC QN AUTO: 27.6 PG (ref 26–35)
MCHC RBC AUTO-ENTMCNC: 31 G/DL (ref 32–34.5)
MCV RBC AUTO: 88.9 FL (ref 80–99.9)
MONOCYTES NFR BLD: 1.47 K/UL (ref 0.1–0.95)
MONOCYTES NFR BLD: 12 % (ref 2–12)
NEUTROPHILS NFR BLD: 70 % (ref 43–80)
NEUTS SEG NFR BLD: 8.62 K/UL (ref 1.8–7.3)
PLATELET, FLUORESCENCE: 133 K/UL (ref 130–450)
PMV BLD AUTO: 13.6 FL (ref 7–12)
POTASSIUM SERPL-SCNC: 3.6 MMOL/L (ref 3.5–5)
RBC # BLD AUTO: 4.79 M/UL (ref 3.8–5.8)
RBC # BLD: NORMAL 10*6/UL
SODIUM SERPL-SCNC: 138 MMOL/L (ref 132–146)
WBC OTHER # BLD: 12.3 K/UL (ref 4.5–11.5)

## 2024-05-22 PROCEDURE — 6370000000 HC RX 637 (ALT 250 FOR IP): Performed by: INTERNAL MEDICINE

## 2024-05-22 PROCEDURE — 2580000003 HC RX 258: Performed by: INTERNAL MEDICINE

## 2024-05-22 PROCEDURE — 99239 HOSP IP/OBS DSCHRG MGMT >30: CPT | Performed by: INTERNAL MEDICINE

## 2024-05-22 PROCEDURE — 80048 BASIC METABOLIC PNL TOTAL CA: CPT

## 2024-05-22 PROCEDURE — 6360000002 HC RX W HCPCS: Performed by: INTERNAL MEDICINE

## 2024-05-22 PROCEDURE — 85025 COMPLETE CBC W/AUTO DIFF WBC: CPT

## 2024-05-22 PROCEDURE — 36415 COLL VENOUS BLD VENIPUNCTURE: CPT

## 2024-05-22 RX ADMIN — Medication 5000 UNITS: at 09:15

## 2024-05-22 RX ADMIN — SODIUM CHLORIDE, PRESERVATIVE FREE 10 ML: 5 INJECTION INTRAVENOUS at 09:14

## 2024-05-22 RX ADMIN — ENOXAPARIN SODIUM 40 MG: 100 INJECTION SUBCUTANEOUS at 09:14

## 2024-05-22 RX ADMIN — ASPIRIN 81 MG: 81 TABLET, CHEWABLE ORAL at 09:14

## 2024-05-22 RX ADMIN — METOPROLOL SUCCINATE 25 MG: 25 TABLET, EXTENDED RELEASE ORAL at 09:14

## 2024-05-22 RX ADMIN — CYANOCOBALAMIN TAB 1000 MCG 250 MCG: 1000 TAB at 09:15

## 2024-05-22 RX ADMIN — PREGABALIN 50 MG: 50 CAPSULE ORAL at 09:14

## 2024-05-22 RX ADMIN — Medication 1 TABLET: at 09:14

## 2024-05-22 RX ADMIN — METOLAZONE 2.5 MG: 2.5 TABLET ORAL at 09:15

## 2024-05-22 RX ADMIN — CALCIUM CARBONATE 500 MG: 500 TABLET, CHEWABLE ORAL at 09:14

## 2024-05-22 RX ADMIN — FUROSEMIDE 40 MG: 40 TABLET ORAL at 09:14

## 2024-05-22 RX ADMIN — CLOPIDOGREL BISULFATE 75 MG: 75 TABLET ORAL at 09:14

## 2024-05-22 ASSESSMENT — PAIN SCALES - GENERAL
PAINLEVEL_OUTOF10: 0

## 2024-05-22 NOTE — NURSE NAVIGATOR
Pt denies any further episodes of dizziness or stationary objects moving.  WBC 12.3 down from 17.2 yesterday.  Afebrile.  Slight ecchymosis R groin. Band-Aid clean, dry, &  intact. No bleeding, drainage, or swelling.   Dr. Bates notified of above, Discharge order obtained

## 2024-05-22 NOTE — CARE COORDINATION
5/22/24, Discharge Acknowledged.  Pre previous CM discharge plan is home with a SADAF for [facility].  Contacted Princess from [place] to inform her that patient will be discharging to home today.  SADAF order has been placed in chart.  Wife for transportation.  SW to follow.      FELICIA Mendoza  [facility] Case Management  [phone]

## 2024-05-23 ENCOUNTER — CARE COORDINATION (OUTPATIENT)
Dept: CARE COORDINATION | Age: 89
End: 2024-05-23

## 2024-05-23 NOTE — DISCHARGE SUMMARY
[facility]    DISCHARGE SUMMARY    DEMOGRAPHICS Deyvi Rose / 4/14/1934 (89 y.o.) / MRN [id number]   ADMIT / DC DATE 5/20/2024 - 5/23/2024 (LOS : 2)   ATTENDING MD No att. providers found   PRIMARY CARDIOLOGY : none  PRIMARY CARE Heather Ascencio MD      REASON FOR ADMIT: Watchman Implant    DISPOSITION Home, lives with family  PLANNED READMIT no    Physician: Dequan Bates MD    PRIMARY DISCHARGE DIAGNOSIS   1. persistent atrial fibrillation    2 High risk of bleeding-frequent falls causing bruising and bleeding of his skin.       SECONDARY DISCHARGE DIAGNOSIS    Need for lifelong endocarditis prophylaxis          ** Implanted  is MRI compatible **     ** Prophylactic antibiotics required before dental work for 1 year  and avoid cleanings for 8 weeks post-procedure **  - N/A-edentulous        CONSULTANTS : none         SUMMARY OF PROCEDURES/STUDIES (see individual procedure notes for more details)    1. 5/20:  Successful Left Atrial Appendage Occlusion Device (Watchman) Procedure using 35 mm Watchman FLX device   2. 5/21: Limited TTE:  Small (<1 cm) pericardial effusion present. No indication of cardiac tamponade         FOLLOW UP AND PENDING PROCEDURES/STUDIES     Follow up CHEKO at 45 days to determine appropriateness for stopping anticoagulation         FOLLOW UP APPOINTMENTS   Future Appointments   Date Time Provider Department Center   5/28/2024  9:00 AM Lisa Agosto PA [facility] CARDIO [facility]   7/5/2024  8:30 AM [facility] [address] [facility] [address] [facility] Rad/Car   7/16/2024  1:00 PM Dequan Baets MD [place] CARDIO [facility]   7/23/2024 12:40 PM Katherine English, APRN - CNP [facility] SLEEP [facility]   8/20/2024 10:00 AM Heather Ascencio MD [facility]           DISCHARGE MEDICATIONS   Discharge Medication List as of 5/22/2024 12:57 PM        START taking these medications    Details   clopidogrel (PLAVIX) 75 MG tablet Take 1 tablet by mouth daily, Disp-90 tablet, R-1Normal      aspirin 81 MG chewable

## 2024-05-23 NOTE — CARE COORDINATION
Care Transitions Initial Follow Up Call    Call within 2 business days of discharge: Yes    Patient Current Location:  Home: [address] [place] [id number]    Care Transition Nurse contacted the patient and spouse/partner by telephone to perform post hospital discharge assessment. Verified name and  with patient as identifiers. Provided introduction to self, and explanation of the Care Transition Nurse role.     Patient: Deyvi Rose Patient : 1934   MRN: [id number]  Reason for Admission: [facility] -24 Presence of Watchman CELESTE Closure Device  Discharge Date: 24 RARS: Readmission Risk Score: 14.6    Last Discharge Facility       Date Complaint Diagnosis Description Type Department Provider    24  Presence of Watchman left atrial appendage closure device ... Admission (Discharged) [facility] Dequan Bates MD        Was this an external facility discharge? No Discharge Facility: [facility]    Challenges to be reviewed by the provider   Additional needs identified to be addressed with provider: No  none               Method of communication with provider: none.    CTN placed call to Patient for Initial Care Transition call, Post Hospital discharge.  Patient admitted for elective Left Atrial Appendage Occlusion Device (Watchman). Past medical history includes HTN, HLD, sleep apnea, obesity, persistent atrial fibrillation (failed multiple DCCV attempts, even with Amiodarone), frequent falls, HFpEF and BPH .    Spoke to both Pt and spouse. Both report Pt is doing very well since discharge. Pt denies CP, ODB, n/v/d, fever, chill, lightheadedness, dizziness. Pt denies numbness, tingling, cramping to procedure leg.     Pt states that incision looks good, EMILY, no redness, swelling, drainage, or dehiscence. Discussed s/s of infection and when to seek emergent care.      Medication review completed. Confirmed Pt  stopped Eliquis and has started Aspirin and Plavix. Reviewed bleeding precautions while on an

## 2024-05-27 NOTE — PROGRESS NOTES
[facility]  Visit Note      Patient name: Deyvi Rose    Reason for visit: Watchman follow up    Primary Care Physician: Heather Ascencio MD    Date of service: 5/28/2024    Chief Complaint: Watchman follow up    HPI: Mr. Rose is a 89 yo male who presents for follow up s/p LAAO using Watchman on 5/20/24. He was kept in the hospital for an extra day due to complaints of dizziness and visual disturbance. Symptoms resolved on their own, and he was discharged home on POD #2. Symptoms have not recurred since.    Otherwise, he is feeling well since the procedure with no complaints.    Allergies:   Allergies   Allergen Reactions    Yellow Jacket Venom Swelling       Home medications:    Current Outpatient Medications   Medication Sig Dispense Refill    clopidogrel (PLAVIX) 75 MG tablet Take 1 tablet by mouth daily 90 tablet 1    aspirin 81 MG chewable tablet Take 1 tablet by mouth daily 90 tablet 3    pregabalin (LYRICA) 50 MG capsule Take 1 capsule by mouth 3 times daily for 120 days. Max Daily Amount: 150 mg 90 capsule 3    Multiple Vitamins-Minerals (THERAPEUTIC MULTIVITAMIN-MINERALS) tablet Take 1 tablet by mouth daily      calcium carbonate 600 MG TABS tablet Take 1 tablet by mouth daily      vitamin D (CHOLECALCIFEROL) 125 MCG (5000 UT) CAPS capsule Take 1 capsule by mouth daily      furosemide (LASIX) 40 MG tablet Take 1 tablet by mouth daily Take 40mg every other day and on the other days take 20mg. 45 tablet 1    tamsulosin (FLOMAX) 0.4 MG capsule Take 1 capsule by mouth at bedtime 90 capsule 1    metoprolol succinate (TOPROL XL) 25 MG extended release tablet Take 1 tablet by mouth daily 90 tablet 3    vitamin B-12 (CYANOCOBALAMIN) 250 MCG tablet Take 1 tablet by mouth daily      metOLazone (ZAROXOLYN) 2.5 MG tablet Take 1 tablet by mouth daily       No current facility-administered medications for this visit.       Past Medical History:  Past Medical History:   Diagnosis Date    Arthritis

## 2024-05-28 ENCOUNTER — OFFICE VISIT (OUTPATIENT)
Dept: CARDIOLOGY CLINIC | Age: 89
End: 2024-05-28
Payer: MEDICARE

## 2024-05-28 VITALS
DIASTOLIC BLOOD PRESSURE: 50 MMHG | SYSTOLIC BLOOD PRESSURE: 120 MMHG | HEART RATE: 88 BPM | BODY MASS INDEX: 31.19 KG/M2 | HEIGHT: 68 IN | RESPIRATION RATE: 14 BRPM | WEIGHT: 205.8 LBS

## 2024-05-28 DIAGNOSIS — Z95.818 PRESENCE OF WATCHMAN LEFT ATRIAL APPENDAGE CLOSURE DEVICE: ICD-10-CM

## 2024-05-28 DIAGNOSIS — I48.11 LONGSTANDING PERSISTENT ATRIAL FIBRILLATION (HCC): Primary | ICD-10-CM

## 2024-05-28 DIAGNOSIS — R29.6 FALLS FREQUENTLY: ICD-10-CM

## 2024-05-28 PROCEDURE — 99213 OFFICE O/P EST LOW 20 MIN: CPT | Performed by: PHYSICIAN ASSISTANT

## 2024-05-28 PROCEDURE — 1123F ACP DISCUSS/DSCN MKR DOCD: CPT | Performed by: PHYSICIAN ASSISTANT

## 2024-05-28 NOTE — PATIENT INSTRUCTIONS
Follow up as scheduled for 45-day visit    Follow up with Nereyda Ascencio and Truman as scheduled    Reminder: antibiotic required prior to dental appointments

## 2024-05-30 ENCOUNTER — CARE COORDINATION (OUTPATIENT)
Dept: CARE COORDINATION | Age: 89
End: 2024-05-30

## 2024-05-30 NOTE — CARE COORDINATION
Care Transitions Note    Follow Up Call      Patient Current Location:  Home: [address] [place] [id number]    Care Transition Nurse contacted the spouse/partner  by telephone. Verified name and  as identifiers.  Care Summary Note:     Spoke with Deyvi's wife Kenyetta, verified on HIPAA, for follow up care transition call. She reports that Deyvi is doing \"very well\". She denies any complaints of dizziness, CP, chest tightness, and reports that his groin site is \"all healed up\".   Kenyetta denies any needs, questions, or concerns at this time.   Deyvi was admitted for a planned cardiac procedure, denies concerns and had his post procedure visit with cardiology 24. Will sign off.     Assessments:  Care Transitions Subsequent and Final Call    Subsequent and Final Calls  Do you have any ongoing symptoms?: No  Have your medications changed?: No  Do you have any questions related to your medications?: No  Do you currently have any active services?: No  Do you have any needs or concerns that I can assist you with?: No  Identified Barriers: None  Care Transitions Interventions  No Identified Needs  Other Interventions:         Follow Up Appointment:     Future Appointments         Provider Specialty Dept Phone    2024 8:30 AM (Arrive by 8:15 AM) [facility]; [facility] Cardiology [phone]    2024 1:00 PM Dequan Bates MD Cardiology [phone]    2024 12:40 PM Katherine English APRN - CNP [facility] [phone]    2024 10:00 AM Heather Ascencio MD Primary Care [phone]          Rosa Phillips RN

## 2024-07-01 ENCOUNTER — TELEPHONE (OUTPATIENT)
Dept: CARDIOLOGY CLINIC | Age: 89
End: 2024-07-01

## 2024-07-01 NOTE — TELEPHONE ENCOUNTER
45 DAY WATCHMAN CHEKO INSTRUCTIONS     I called Deyvi's wife Debbie  to go over his CHEKO Instructions  Place: [facility]  Date & time of CHEKO: 7/5/24 at 8:30 am  Arrival Time: 7:30 am  NPO after midnight before CHEKO  May take your medications with a sip of water the morning of the procedure  Hold diuretics day of CHEKO: Zaroxolyn & Lasix  Must have a ride home after the procedure  More detailed instructions emailed to Debbie at [email or web address], and scanned into Epic.  All questions answered  Appt w/ Dr. Bates: 7/16/24 at 1:00 pm

## 2024-07-03 ENCOUNTER — TELEPHONE (OUTPATIENT)
Age: 89
End: 2024-07-03

## 2024-07-03 NOTE — TELEPHONE ENCOUNTER
Reminded patient's wife, Debbie,via voicemail of scheduled procedure on 7/5..  Instructions given.

## 2024-07-05 ENCOUNTER — ANESTHESIA (OUTPATIENT)
Age: 89
End: 2024-07-05
Payer: MEDICARE

## 2024-07-05 ENCOUNTER — HOSPITAL ENCOUNTER (OUTPATIENT)
Age: 89
End: 2024-07-05
Attending: INTERNAL MEDICINE
Payer: MEDICARE

## 2024-07-05 ENCOUNTER — ANESTHESIA EVENT (OUTPATIENT)
Age: 89
End: 2024-07-05
Payer: MEDICARE

## 2024-07-05 VITALS
HEART RATE: 80 BPM | RESPIRATION RATE: 20 BRPM | SYSTOLIC BLOOD PRESSURE: 125 MMHG | OXYGEN SATURATION: 97 % | TEMPERATURE: 97.9 F | DIASTOLIC BLOOD PRESSURE: 81 MMHG

## 2024-07-05 DIAGNOSIS — Z95.818 PRESENCE OF WATCHMAN LEFT ATRIAL APPENDAGE CLOSURE DEVICE: ICD-10-CM

## 2024-07-05 DIAGNOSIS — I48.19 PERSISTENT ATRIAL FIBRILLATION (HCC): ICD-10-CM

## 2024-07-05 PROCEDURE — 3700000000 HC ANESTHESIA ATTENDED CARE: Performed by: INTERNAL MEDICINE

## 2024-07-05 PROCEDURE — 93320 DOPPLER ECHO COMPLETE: CPT

## 2024-07-05 PROCEDURE — 6360000002 HC RX W HCPCS: Performed by: NURSE ANESTHETIST, CERTIFIED REGISTERED

## 2024-07-05 PROCEDURE — 3700000001 HC ADD 15 MINUTES (ANESTHESIA): Performed by: INTERNAL MEDICINE

## 2024-07-05 PROCEDURE — 93319 3D ECHO IMG CGEN CAR ANOMAL: CPT | Performed by: INTERNAL MEDICINE

## 2024-07-05 PROCEDURE — 2580000003 HC RX 258: Performed by: INTERNAL MEDICINE

## 2024-07-05 PROCEDURE — 7100000011 HC PHASE II RECOVERY - ADDTL 15 MIN: Performed by: INTERNAL MEDICINE

## 2024-07-05 PROCEDURE — 93320 DOPPLER ECHO COMPLETE: CPT | Performed by: INTERNAL MEDICINE

## 2024-07-05 PROCEDURE — 93319 3D ECHO IMG CGEN CAR ANOMAL: CPT

## 2024-07-05 PROCEDURE — 93312 ECHO TRANSESOPHAGEAL: CPT | Performed by: INTERNAL MEDICINE

## 2024-07-05 PROCEDURE — 2500000003 HC RX 250 WO HCPCS: Performed by: NURSE ANESTHETIST, CERTIFIED REGISTERED

## 2024-07-05 PROCEDURE — 7100000010 HC PHASE II RECOVERY - FIRST 15 MIN: Performed by: INTERNAL MEDICINE

## 2024-07-05 RX ORDER — SODIUM CHLORIDE 9 MG/ML
INJECTION, SOLUTION INTRAVENOUS CONTINUOUS
Status: DISCONTINUED | OUTPATIENT
Start: 2024-07-05 | End: 2024-07-08 | Stop reason: HOSPADM

## 2024-07-05 RX ORDER — PROPOFOL 10 MG/ML
INJECTION, EMULSION INTRAVENOUS PRN
Status: DISCONTINUED | OUTPATIENT
Start: 2024-07-05 | End: 2024-07-05 | Stop reason: SDUPTHER

## 2024-07-05 RX ORDER — LIDOCAINE HYDROCHLORIDE 20 MG/ML
INJECTION, SOLUTION INFILTRATION; PERINEURAL PRN
Status: DISCONTINUED | OUTPATIENT
Start: 2024-07-05 | End: 2024-07-05 | Stop reason: SDUPTHER

## 2024-07-05 RX ADMIN — PROPOFOL 50 MG: 10 INJECTION, EMULSION INTRAVENOUS at 12:20

## 2024-07-05 RX ADMIN — PROPOFOL 15 MG: 10 INJECTION, EMULSION INTRAVENOUS at 12:23

## 2024-07-05 RX ADMIN — SODIUM CHLORIDE: 9 INJECTION, SOLUTION INTRAVENOUS at 12:20

## 2024-07-05 RX ADMIN — PROPOFOL 15 MG: 10 INJECTION, EMULSION INTRAVENOUS at 12:22

## 2024-07-05 RX ADMIN — PROPOFOL 20 MG: 10 INJECTION, EMULSION INTRAVENOUS at 12:24

## 2024-07-05 RX ADMIN — LIDOCAINE HYDROCHLORIDE 100 MG: 20 INJECTION, SOLUTION INFILTRATION; PERINEURAL at 12:20

## 2024-07-05 RX ADMIN — PROPOFOL 30 MG: 10 INJECTION, EMULSION INTRAVENOUS at 12:21

## 2024-07-05 ASSESSMENT — PAIN - FUNCTIONAL ASSESSMENT: PAIN_FUNCTIONAL_ASSESSMENT: NONE - DENIES PAIN

## 2024-07-05 ASSESSMENT — LIFESTYLE VARIABLES: SMOKING_STATUS: 0

## 2024-07-05 NOTE — H&P
Pt here for post watchman CHEKO.     Risk and benefits of CHEKO cardioversion explained to patient including risk of esophageal perforation, CVA, failure to restore sinus rhythm, recurrence of atrial arrhythmias.  He understands and agrees to proceed.       Physical Exam   /88   Pulse 84   Temp 97.9 °F (36.6 °C) (Temporal)   Resp 16   SpO2 99%   Constitutional: Oriented to person, place, and time. Well-developed and well-nourished. No distress.    Head: Normocephalic and atraumatic.   Eyes: EOM are normal. Pupils are equal, round, and reactive to light.   Neck: Normal range of motion. Neck supple. No hepatojugular reflux and no JVD present. Carotid bruit is not present. No tracheal deviation present. No thyromegaly present.   Cardiovascular: Normal rate, regular rhythm, normal heart sounds and intact distal pulses.  Exam reveals no gallop and no friction rub.  No murmur heard.  Pulmonary/Chest: Effort normal and breath sounds normal. No respiratory distress. No wheezes. No rales. No tenderness.   Abdominal: Soft. Bowel sounds are normal. No distension and no mass. No tenderness. No rebound and no guarding.   Musculoskeletal: Normal range of motion. No edema and no tenderness.   Lymphadenopathy:   No cervical adenopathy.   Neurological: Alert and oriented to person, place, and time.   Skin: Skin is warm and dry. No rash noted. Not diaphoretic. No erythema.   Psychiatric: Normal mood and affect. Behavior is normal.       Will proceed with CHEKO.

## 2024-07-05 NOTE — ANESTHESIA PRE PROCEDURE
Watchman   Anticoagulation       Neuro/Psych:   (+) depression/anxiety              ROS comment: Neuropathy GI/Hepatic/Renal:   (+) GERD: well controlled          Endo/Other:    (+) : arthritis: OA..                  ROS comment: BPH with obstruction/lower urinary tract symptoms s/p TURP   Opioid dependence with current use Abdominal:             Vascular: negative vascular ROS.         Other Findings:    EKG 4/10/24        Narrative & Impression    Atrial fibrillation with premature ventricular or aberrantly conducted complexes  Delayed precordial transition  Abnormal ECG  When compared with ECG of 20-JUL-2018 13:30,  Atrial fibrillation present  Confirmed by Malik Oliver ([id number]) on 4/10/2024 3:26:33 PM           2D ECHO 03/23/23   Findings      Left Ventricle   Normal left ventricular chamber size.   Normal left ventricular systolic function, EF 70%.   Mild left ventricular concentric hypertrophy noted.   Indeterminate diastolic function.      Right Ventricle   Normal right ventricle size and function.      Left Atrium   Left atrium is enlarged, 4.5cm.   Interatrial septum appears intact.      Right Atrium   Normal right atrium.      Mitral Valve   Normal mitral valve structure.   Trace mitral regurgitation.   No mitral valve prolapse.      Tricuspid Valve   Normal tricuspid valve structure.   Mild tricuspid regurgitation, RVSP 25mmg.      Aortic Valve   Normal aortic valve structure.   No significant aortic stenosis.   Trace aortic regurgitation, pressure 1/2 time 668ms.      Pulmonic Valve   The pulmonic valve was not well visualized.      Pericardial Effusion   No evidence of pericardial effusion. Pericardium appears normal.      Pleural Effusion   No evidence of pleural effusion.      Aorta   Normal aortic root size and ascending aorta.   Miscellaneous   The inferior vena cava diameter is normal with normal respiratory   variation.      Conclusions      Summary   Normal left ventricular chamber size.

## 2024-07-05 NOTE — ANESTHESIA POSTPROCEDURE EVALUATION
Department of Anesthesiology  Postprocedure Note    Patient: Deyvi Rose  MRN: [id number]  YOB: 1934  Date of evaluation: 7/5/2024    Procedure Summary       Date: 07/05/24 Room / Location: [facility] Cardiac Cath Lab; [facility] Noninvasive Cardiology    Anesthesia Start: 1216 Anesthesia Stop: 1244    Procedure: CHEKO (PRN CONTRAST/BUBBLE/3D) Diagnosis:       Persistent atrial fibrillation (HCC)      Presence of Watchman left atrial appendage closure device    Scheduled Providers: Kavin Mays MD Responsible Provider: Kavin Mays MD    Anesthesia Type: MAC ASA Status: 4            Anesthesia Type: MAC    Marcell Phase I:      Marcell Phase II:      Anesthesia Post Evaluation    Patient location during evaluation: PACU  Patient participation: complete - patient participated  Level of consciousness: awake  Pain score: 3  Airway patency: patent  Nausea & Vomiting: no nausea and no vomiting  Cardiovascular status: blood pressure returned to baseline  Respiratory status: acceptable  Hydration status: euvolemic    No notable events documented.

## 2024-07-18 ENCOUNTER — TELEPHONE (OUTPATIENT)
Dept: CARDIOLOGY CLINIC | Age: 89
End: 2024-07-18

## 2024-07-18 NOTE — TELEPHONE ENCOUNTER
MARY 45 DAY FU CALL    I called Debbie; Deyvi's wife, to see how he's doing 45 Day post Mary & why he cancelled his appt w/ Dr. Chi on 7/16.    Debbie said that Deyvi had an infection in his L Great toe & was admitted to [facility]  on 7/12/24 & had yo have a partial amputation of that toe & he's currently very weak & getting transferred to [place] for rehab.    She states he & is currently taking Plavix & ASA 81mg PO daily & not had any problems with bleeding or strokes .    I instructed her to make sure he continues taking ASA 81 mg po daily per Dr. Chi's  postprocedure antithrombotic therapy documented in his Mary Op report    For purpose of the LAAO Registry, I also conducted the Modified Desha Scale & the Barthel Index Evaluation over the phone.  The results are below.    MODIFIED LESLIE SCALE  Mod- Severely disabled - very weak. Unable to stand.  Going to rehab after DC from [facility]    Barthel Index Evaluation   Feeding: needs help  Bathing: dependent  Grooming: needs help  Dressing: needs help  Bowels: Continent  Bladder: Continent  Toilet Use: Independent  Transfers: major assist  Mobility:wheelchair  Stairs: unable

## 2024-08-14 ENCOUNTER — OUTSIDE SERVICES (OUTPATIENT)
Dept: PRIMARY CARE CLINIC | Age: 89
End: 2024-08-14
Payer: MEDICARE

## 2024-08-14 DIAGNOSIS — N40.1 BPH WITH OBSTRUCTION/LOWER URINARY TRACT SYMPTOMS: ICD-10-CM

## 2024-08-14 DIAGNOSIS — E78.2 MIXED HYPERLIPIDEMIA: ICD-10-CM

## 2024-08-14 DIAGNOSIS — Z95.818 PRESENCE OF WATCHMAN LEFT ATRIAL APPENDAGE CLOSURE DEVICE: ICD-10-CM

## 2024-08-14 DIAGNOSIS — I48.0 PAROXYSMAL ATRIAL FIBRILLATION (HCC): ICD-10-CM

## 2024-08-14 DIAGNOSIS — U07.1 COVID: Primary | ICD-10-CM

## 2024-08-14 DIAGNOSIS — I10 PRIMARY HYPERTENSION: ICD-10-CM

## 2024-08-14 DIAGNOSIS — M10.9 ACUTE GOUT, UNSPECIFIED CAUSE, UNSPECIFIED SITE: ICD-10-CM

## 2024-08-14 DIAGNOSIS — I50.42 CHRONIC COMBINED SYSTOLIC AND DIASTOLIC CONGESTIVE HEART FAILURE (HCC): ICD-10-CM

## 2024-08-14 DIAGNOSIS — N13.8 BPH WITH OBSTRUCTION/LOWER URINARY TRACT SYMPTOMS: ICD-10-CM

## 2024-08-14 DIAGNOSIS — R53.1 WEAKNESS: ICD-10-CM

## 2024-08-14 PROCEDURE — 99497 ADVNCD CARE PLAN 30 MIN: CPT | Performed by: INTERNAL MEDICINE

## 2024-08-14 PROCEDURE — 99306 1ST NF CARE HIGH MDM 50: CPT | Performed by: INTERNAL MEDICINE

## 2024-08-20 ASSESSMENT — ENCOUNTER SYMPTOMS
WHEEZING: 0
SINUS PAIN: 0
EYE REDNESS: 0
SINUS PRESSURE: 0
VOICE CHANGE: 0
GASTROINTESTINAL NEGATIVE: 1
RHINORRHEA: 0
EYE ITCHING: 0
EYE DISCHARGE: 0
SHORTNESS OF BREATH: 0
TROUBLE SWALLOWING: 0
SORE THROAT: 0
COUGH: 1

## 2024-08-20 ASSESSMENT — VISUAL ACUITY: OU: 1

## 2024-08-20 NOTE — PROGRESS NOTES
Status: He is alert. Mental status is at baseline.      Cranial Nerves: No cranial nerve deficit.      Deep Tendon Reflexes: Reflexes normal.   Psychiatric:         Mood and Affect: Mood and affect normal. Mood is not depressed.         Behavior: Behavior normal. Behavior is not agitated.         Cognition and Memory: Cognition and memory normal.      Comments: No apparent anxiety         Assessment & Plan:  1. COVID  2. Weakness  3. Paroxysmal atrial fibrillation (HCC)  4. Chronic combined systolic and diastolic congestive heart failure (HCC)  5. Primary hypertension  6. Presence of Watchman left atrial appendage closure device  7. Mixed hyperlipidemia  8. BPH with obstruction/lower urinary tract symptoms  9. Acute gout, unspecified cause, unspecified site     Hospital notes reviewed   Chart and medications reviewed   Pt/ot eval and treat   Covid resolved   Chf on lasix   Htn on metoprolol monitor blood pressures   Has bilateral hand swelling and redness will treat for gout start colchicine 0.6mg bid and check urinc acid level   Check labs   Continue current treatment   Advanced Care Planning: Discussed the patient’s choices for care and treatment in case of a health event that adversely affects decision-making abilities. Also discussed the patient’s long-term treatment options. Reviewed with the patient the appropriate state-specific advance directive documents. Reviewed the process of designating a competent adult as an Agent (or -in-fact) that could take make health care decisions for the patient if incompetent. Patient was asked to complete the declaration forms, if they have not already, either acknowledge the forms by a public notary or an eligible witness and provide a signed copy to the practice office. Per patient he is a dnrcc     Time spent (minutes): Diane FRIEDMAN MA  am scribing for Levy Bains MD, personally performed the services described in this

## 2024-08-21 ENCOUNTER — OUTSIDE SERVICES (OUTPATIENT)
Dept: PRIMARY CARE CLINIC | Age: 89
End: 2024-08-21

## 2024-08-21 DIAGNOSIS — N13.8 BPH WITH OBSTRUCTION/LOWER URINARY TRACT SYMPTOMS: ICD-10-CM

## 2024-08-21 DIAGNOSIS — E78.2 MIXED HYPERLIPIDEMIA: ICD-10-CM

## 2024-08-21 DIAGNOSIS — M10.9 ACUTE GOUT, UNSPECIFIED CAUSE, UNSPECIFIED SITE: ICD-10-CM

## 2024-08-21 DIAGNOSIS — Z95.818 PRESENCE OF WATCHMAN LEFT ATRIAL APPENDAGE CLOSURE DEVICE: ICD-10-CM

## 2024-08-21 DIAGNOSIS — I10 PRIMARY HYPERTENSION: ICD-10-CM

## 2024-08-21 DIAGNOSIS — N40.1 BPH WITH OBSTRUCTION/LOWER URINARY TRACT SYMPTOMS: ICD-10-CM

## 2024-08-21 DIAGNOSIS — I50.42 CHRONIC COMBINED SYSTOLIC AND DIASTOLIC CONGESTIVE HEART FAILURE (HCC): ICD-10-CM

## 2024-08-21 DIAGNOSIS — R53.1 WEAKNESS: Primary | ICD-10-CM

## 2024-08-21 DIAGNOSIS — I48.0 PAROXYSMAL ATRIAL FIBRILLATION (HCC): ICD-10-CM

## 2024-08-27 ENCOUNTER — OUTSIDE SERVICES (OUTPATIENT)
Dept: PRIMARY CARE CLINIC | Age: 89
End: 2024-08-27

## 2024-08-27 DIAGNOSIS — I10 PRIMARY HYPERTENSION: ICD-10-CM

## 2024-08-27 DIAGNOSIS — R53.1 WEAKNESS: Primary | ICD-10-CM

## 2024-08-27 DIAGNOSIS — Z95.818 PRESENCE OF WATCHMAN LEFT ATRIAL APPENDAGE CLOSURE DEVICE: ICD-10-CM

## 2024-08-27 DIAGNOSIS — I50.42 CHRONIC COMBINED SYSTOLIC AND DIASTOLIC CONGESTIVE HEART FAILURE (HCC): ICD-10-CM

## 2024-08-27 DIAGNOSIS — N13.8 BPH WITH OBSTRUCTION/LOWER URINARY TRACT SYMPTOMS: ICD-10-CM

## 2024-08-27 DIAGNOSIS — I48.0 PAROXYSMAL ATRIAL FIBRILLATION (HCC): ICD-10-CM

## 2024-08-27 DIAGNOSIS — N40.1 BPH WITH OBSTRUCTION/LOWER URINARY TRACT SYMPTOMS: ICD-10-CM

## 2024-08-27 DIAGNOSIS — E78.2 MIXED HYPERLIPIDEMIA: ICD-10-CM

## 2024-08-27 ASSESSMENT — ENCOUNTER SYMPTOMS
GASTROINTESTINAL NEGATIVE: 1
EYE ITCHING: 0
EYE DISCHARGE: 0
EYE REDNESS: 0

## 2024-08-27 ASSESSMENT — VISUAL ACUITY: OU: 1

## 2024-08-27 NOTE — PROGRESS NOTES
Visit Date: 8/21/24  Deyvi Rose  4/14/1934  male 89 y.o.      Subjective:    CC: Patient presents with  weakness. Patient presents for follow up of afib, htn, hyperlipidemia, bph, and chf.    HPI:  Extremity Weakness  This is a new problem. The current episode started 1 to 4 weeks ago. The problem occurs constantly. The problem has been gradually improving. Nothing aggravates the symptoms. Treatments tried: working with therapy. The treatment provided mild relief.   Atrial Fibrillation  Presents for follow-up visit. The symptoms have been stable. Past medical history includes CHF and hyperlipidemia. Compliance problems: taking medications, no medication side effects.   Hypertension  This is a new problem. The current episode started more than 1 year ago. The problem is unchanged. The problem is controlled. There are no associated agents to hypertension. Risk factors for coronary artery disease include dyslipidemia, male gender and obesity. Treatments tried: taking medications, no medication side effects. The current treatment provides mild improvement. There are no compliance problems.    Hyperlipidemia  This is a chronic problem. The current episode started more than 1 year ago. The problem is controlled. Recent lipid tests were reviewed and are variable. There are no known factors aggravating his hyperlipidemia. Treatments tried: taking medications, no medication side effects. The current treatment provides mild improvement of lipids. There are no compliance problems.  Risk factors for coronary artery disease include hypertension, dyslipidemia and male sex.   Congestive Heart Failure  Presents for follow-up visit. The symptoms have been stable. Compliance with total regimen is %. Compliance with diet is %. Compliance with exercise is %. Compliance with medications is %.   Benign Prostatic Hypertrophy  This is a recurrent problem. The current episode started more than 1 year ago. The

## 2024-08-29 ASSESSMENT — ENCOUNTER SYMPTOMS
EYE DISCHARGE: 0
EYE ITCHING: 0
EYE REDNESS: 0
GASTROINTESTINAL NEGATIVE: 1

## 2024-08-29 ASSESSMENT — VISUAL ACUITY: OU: 1

## 2024-08-29 NOTE — PROGRESS NOTES
Visit Date: 8/27/24  Deyvi Rose  4/14/1934  male 89 y.o.      Subjective:    CC: Patient presents with  weakness. Patient presents for follow up of afib, htn, hyperlipidemia, bph, and chf.    HPI:  Extremity Weakness  This is a new problem. The current episode started 1 to 4 weeks ago. The problem occurs constantly. The problem has been gradually improving. Nothing aggravates the symptoms. Treatments tried: working with therapy. The treatment provided mild relief.   Atrial Fibrillation  Presents for follow-up visit. The symptoms have been stable. Past medical history includes CHF and hyperlipidemia. Compliance problems: taking medications, no medication side effects.   Hypertension  This is a new problem. The current episode started more than 1 year ago. The problem is unchanged. The problem is controlled. There are no associated agents to hypertension. Risk factors for coronary artery disease include dyslipidemia, male gender and obesity. Treatments tried: taking medications, no medication side effects. The current treatment provides mild improvement. There are no compliance problems.    Hyperlipidemia  This is a chronic problem. The current episode started more than 1 year ago. The problem is controlled. Recent lipid tests were reviewed and are variable. There are no known factors aggravating his hyperlipidemia. Treatments tried: taking medications, no medication side effects. The current treatment provides mild improvement of lipids. There are no compliance problems.  Risk factors for coronary artery disease include hypertension, dyslipidemia and male sex.   Congestive Heart Failure  Presents for follow-up visit. The symptoms have been stable. Compliance with total regimen is %. Compliance with diet is %. Compliance with exercise is %. Compliance with medications is %.   Benign Prostatic Hypertrophy  This is a recurrent problem. The current episode started more than 1 year ago. The

## 2024-09-06 ENCOUNTER — OUTSIDE SERVICES (OUTPATIENT)
Dept: PRIMARY CARE CLINIC | Age: 89
End: 2024-09-06

## 2024-09-06 DIAGNOSIS — N13.8 BPH WITH OBSTRUCTION/LOWER URINARY TRACT SYMPTOMS: ICD-10-CM

## 2024-09-06 DIAGNOSIS — R53.1 WEAKNESS: Primary | ICD-10-CM

## 2024-09-06 DIAGNOSIS — E78.2 MIXED HYPERLIPIDEMIA: ICD-10-CM

## 2024-09-06 DIAGNOSIS — I48.0 PAROXYSMAL ATRIAL FIBRILLATION (HCC): ICD-10-CM

## 2024-09-06 DIAGNOSIS — Z95.818 PRESENCE OF WATCHMAN LEFT ATRIAL APPENDAGE CLOSURE DEVICE: ICD-10-CM

## 2024-09-06 DIAGNOSIS — I50.42 CHRONIC COMBINED SYSTOLIC AND DIASTOLIC CONGESTIVE HEART FAILURE (HCC): ICD-10-CM

## 2024-09-06 DIAGNOSIS — N40.1 BPH WITH OBSTRUCTION/LOWER URINARY TRACT SYMPTOMS: ICD-10-CM

## 2024-09-06 DIAGNOSIS — I10 PRIMARY HYPERTENSION: ICD-10-CM

## 2024-09-06 DIAGNOSIS — I73.9 PVD (PERIPHERAL VASCULAR DISEASE) (HCC): ICD-10-CM

## 2024-09-11 ENCOUNTER — OUTSIDE SERVICES (OUTPATIENT)
Dept: PRIMARY CARE CLINIC | Age: 89
End: 2024-09-11
Payer: MEDICARE

## 2024-09-11 DIAGNOSIS — N13.8 BPH WITH OBSTRUCTION/LOWER URINARY TRACT SYMPTOMS: ICD-10-CM

## 2024-09-11 DIAGNOSIS — E78.2 MIXED HYPERLIPIDEMIA: ICD-10-CM

## 2024-09-11 DIAGNOSIS — N40.1 BPH WITH OBSTRUCTION/LOWER URINARY TRACT SYMPTOMS: ICD-10-CM

## 2024-09-11 DIAGNOSIS — I73.9 PVD (PERIPHERAL VASCULAR DISEASE) (HCC): ICD-10-CM

## 2024-09-11 DIAGNOSIS — Z95.818 PRESENCE OF WATCHMAN LEFT ATRIAL APPENDAGE CLOSURE DEVICE: ICD-10-CM

## 2024-09-11 DIAGNOSIS — E53.8 B12 DEFICIENCY: ICD-10-CM

## 2024-09-11 DIAGNOSIS — R53.1 WEAKNESS: Primary | ICD-10-CM

## 2024-09-11 DIAGNOSIS — I10 PRIMARY HYPERTENSION: ICD-10-CM

## 2024-09-11 DIAGNOSIS — I48.0 PAROXYSMAL ATRIAL FIBRILLATION (HCC): ICD-10-CM

## 2024-09-11 DIAGNOSIS — I50.42 CHRONIC COMBINED SYSTOLIC AND DIASTOLIC CONGESTIVE HEART FAILURE (HCC): ICD-10-CM

## 2024-09-11 PROCEDURE — 99309 SBSQ NF CARE MODERATE MDM 30: CPT | Performed by: INTERNAL MEDICINE

## 2024-09-12 ENCOUNTER — TELEPHONE (OUTPATIENT)
Dept: PRIMARY CARE CLINIC | Age: 89
End: 2024-09-12

## 2024-09-14 ENCOUNTER — HOSPITAL ENCOUNTER (OUTPATIENT)
Age: 89
Setting detail: OBSERVATION
Discharge: HOME HEALTH | End: 2024-09-17
Attending: EMERGENCY MEDICINE | Admitting: INTERNAL MEDICINE
Payer: MEDICARE

## 2024-09-14 DIAGNOSIS — L89.302 PRESSURE INJURY OF BUTTOCK, STAGE 2, UNSPECIFIED LATERALITY (HCC): Primary | ICD-10-CM

## 2024-09-14 PROBLEM — L89.152 SACRAL DECUBITUS ULCER, STAGE II (HCC): Status: ACTIVE | Noted: 2024-09-14

## 2024-09-14 LAB
ALBUMIN SERPL-MCNC: 3.3 G/DL (ref 3.5–5.2)
ALP SERPL-CCNC: 89 U/L (ref 40–129)
ALT SERPL-CCNC: 40 U/L (ref 0–40)
ANION GAP SERPL CALCULATED.3IONS-SCNC: 9 MMOL/L (ref 7–16)
AST SERPL-CCNC: 45 U/L (ref 0–39)
BASOPHILS # BLD: 0 K/UL (ref 0–0.2)
BASOPHILS NFR BLD: 0 % (ref 0–2)
BILIRUB SERPL-MCNC: 0.7 MG/DL (ref 0–1.2)
BUN SERPL-MCNC: 22 MG/DL (ref 6–23)
CALCIUM SERPL-MCNC: 8.4 MG/DL (ref 8.6–10.2)
CHLORIDE SERPL-SCNC: 104 MMOL/L (ref 98–107)
CO2 SERPL-SCNC: 25 MMOL/L (ref 22–29)
CREAT SERPL-MCNC: 0.5 MG/DL (ref 0.7–1.2)
CRP SERPL HS-MCNC: 11 MG/L (ref 0–5)
EOSINOPHIL # BLD: 0.14 K/UL (ref 0.05–0.5)
EOSINOPHILS RELATIVE PERCENT: 1 % (ref 0–6)
ERYTHROCYTE [DISTWIDTH] IN BLOOD BY AUTOMATED COUNT: 17.9 % (ref 11.5–15)
ERYTHROCYTE [SEDIMENTATION RATE] IN BLOOD BY WESTERGREN METHOD: 3 MM/HR (ref 0–15)
GFR, ESTIMATED: >90 ML/MIN/1.73M2
GLUCOSE SERPL-MCNC: 135 MG/DL (ref 74–99)
HCT VFR BLD AUTO: 43.4 % (ref 37–54)
HGB BLD-MCNC: 13.3 G/DL (ref 12.5–16.5)
LACTATE BLDV-SCNC: 1.7 MMOL/L (ref 0.5–2.2)
LYMPHOCYTES NFR BLD: 0.85 K/UL (ref 1.5–4)
LYMPHOCYTES RELATIVE PERCENT: 5 % (ref 20–42)
MCH RBC QN AUTO: 27.8 PG (ref 26–35)
MCHC RBC AUTO-ENTMCNC: 30.6 G/DL (ref 32–34.5)
MCV RBC AUTO: 90.6 FL (ref 80–99.9)
MONOCYTES NFR BLD: 1.41 K/UL (ref 0.1–0.95)
MONOCYTES NFR BLD: 9 % (ref 2–12)
MYELOCYTES ABSOLUTE COUNT: 0.14 K/UL
MYELOCYTES: 1 %
NEUTROPHILS NFR BLD: 85 % (ref 43–80)
NEUTS SEG NFR BLD: 13.86 K/UL (ref 1.8–7.3)
PLATELET, FLUORESCENCE: 136 K/UL (ref 130–450)
PMV BLD AUTO: 13.4 FL (ref 7–12)
POTASSIUM SERPL-SCNC: 5.5 MMOL/L (ref 3.5–5)
PROT SERPL-MCNC: 6 G/DL (ref 6.4–8.3)
RBC # BLD AUTO: 4.79 M/UL (ref 3.8–5.8)
RBC # BLD: ABNORMAL 10*6/UL
SODIUM SERPL-SCNC: 138 MMOL/L (ref 132–146)
WBC OTHER # BLD: 16.4 K/UL (ref 4.5–11.5)

## 2024-09-14 PROCEDURE — 99222 1ST HOSP IP/OBS MODERATE 55: CPT | Performed by: INTERNAL MEDICINE

## 2024-09-14 PROCEDURE — G0378 HOSPITAL OBSERVATION PER HR: HCPCS

## 2024-09-14 PROCEDURE — 83605 ASSAY OF LACTIC ACID: CPT

## 2024-09-14 PROCEDURE — 85652 RBC SED RATE AUTOMATED: CPT

## 2024-09-14 PROCEDURE — 2580000003 HC RX 258: Performed by: INTERNAL MEDICINE

## 2024-09-14 PROCEDURE — 2580000003 HC RX 258

## 2024-09-14 PROCEDURE — 6360000002 HC RX W HCPCS: Performed by: INTERNAL MEDICINE

## 2024-09-14 PROCEDURE — 85025 COMPLETE CBC W/AUTO DIFF WBC: CPT

## 2024-09-14 PROCEDURE — 86140 C-REACTIVE PROTEIN: CPT

## 2024-09-14 PROCEDURE — 80053 COMPREHEN METABOLIC PANEL: CPT

## 2024-09-14 PROCEDURE — 96372 THER/PROPH/DIAG INJ SC/IM: CPT

## 2024-09-14 PROCEDURE — 6370000000 HC RX 637 (ALT 250 FOR IP): Performed by: INTERNAL MEDICINE

## 2024-09-14 PROCEDURE — 99285 EMERGENCY DEPT VISIT HI MDM: CPT

## 2024-09-14 RX ORDER — DOCUSATE SODIUM 100 MG/1
100 CAPSULE, LIQUID FILLED ORAL DAILY
Status: DISCONTINUED | OUTPATIENT
Start: 2024-09-14 | End: 2024-09-17 | Stop reason: HOSPADM

## 2024-09-14 RX ORDER — FUROSEMIDE 40 MG
40 TABLET ORAL DAILY
Status: DISCONTINUED | OUTPATIENT
Start: 2024-09-15 | End: 2024-09-17 | Stop reason: HOSPADM

## 2024-09-14 RX ORDER — CALCIUM CARBONATE 500(1250)
500 TABLET ORAL DAILY
Status: DISCONTINUED | OUTPATIENT
Start: 2024-09-15 | End: 2024-09-17 | Stop reason: HOSPADM

## 2024-09-14 RX ORDER — LANOLIN ALCOHOL/MO/W.PET/CERES
250 CREAM (GRAM) TOPICAL DAILY
Status: DISCONTINUED | OUTPATIENT
Start: 2024-09-15 | End: 2024-09-17 | Stop reason: HOSPADM

## 2024-09-14 RX ORDER — ACETAMINOPHEN 325 MG/1
650 TABLET ORAL EVERY 6 HOURS PRN
Status: DISCONTINUED | OUTPATIENT
Start: 2024-09-14 | End: 2024-09-17 | Stop reason: HOSPADM

## 2024-09-14 RX ORDER — ALLOPURINOL 100 MG/1
100 TABLET ORAL DAILY
Status: DISCONTINUED | OUTPATIENT
Start: 2024-09-15 | End: 2024-09-17 | Stop reason: HOSPADM

## 2024-09-14 RX ORDER — ONDANSETRON 2 MG/ML
4 INJECTION INTRAMUSCULAR; INTRAVENOUS EVERY 6 HOURS PRN
Status: DISCONTINUED | OUTPATIENT
Start: 2024-09-14 | End: 2024-09-14 | Stop reason: CLARIF

## 2024-09-14 RX ORDER — SODIUM CHLORIDE 0.9 % (FLUSH) 0.9 %
5-40 SYRINGE (ML) INJECTION PRN
Status: DISCONTINUED | OUTPATIENT
Start: 2024-09-14 | End: 2024-09-17 | Stop reason: HOSPADM

## 2024-09-14 RX ORDER — PHENOL 1.4 %
1 AEROSOL, SPRAY (ML) MUCOUS MEMBRANE DAILY
Status: DISCONTINUED | OUTPATIENT
Start: 2024-09-15 | End: 2024-09-14 | Stop reason: CLARIF

## 2024-09-14 RX ORDER — M-VIT,TX,IRON,MINS/CALC/FOLIC 27MG-0.4MG
1 TABLET ORAL DAILY
Status: DISCONTINUED | OUTPATIENT
Start: 2024-09-15 | End: 2024-09-17 | Stop reason: HOSPADM

## 2024-09-14 RX ORDER — ACETAMINOPHEN 650 MG/1
650 SUPPOSITORY RECTAL EVERY 6 HOURS PRN
Status: DISCONTINUED | OUTPATIENT
Start: 2024-09-14 | End: 2024-09-17 | Stop reason: HOSPADM

## 2024-09-14 RX ORDER — PREGABALIN 50 MG/1
50 CAPSULE ORAL 3 TIMES DAILY
Status: DISCONTINUED | OUTPATIENT
Start: 2024-09-14 | End: 2024-09-17 | Stop reason: HOSPADM

## 2024-09-14 RX ORDER — TAMSULOSIN HYDROCHLORIDE 0.4 MG/1
0.4 CAPSULE ORAL NIGHTLY
Status: DISCONTINUED | OUTPATIENT
Start: 2024-09-14 | End: 2024-09-17 | Stop reason: HOSPADM

## 2024-09-14 RX ORDER — ENOXAPARIN SODIUM 100 MG/ML
40 INJECTION SUBCUTANEOUS DAILY
Status: DISCONTINUED | OUTPATIENT
Start: 2024-09-14 | End: 2024-09-17 | Stop reason: HOSPADM

## 2024-09-14 RX ORDER — POLYETHYLENE GLYCOL 3350 17 G/17G
17 POWDER, FOR SOLUTION ORAL DAILY
Status: DISCONTINUED | OUTPATIENT
Start: 2024-09-14 | End: 2024-09-17 | Stop reason: HOSPADM

## 2024-09-14 RX ORDER — CLOPIDOGREL BISULFATE 75 MG/1
75 TABLET ORAL DAILY
Status: DISCONTINUED | OUTPATIENT
Start: 2024-09-15 | End: 2024-09-17 | Stop reason: HOSPADM

## 2024-09-14 RX ORDER — SODIUM CHLORIDE 9 MG/ML
INJECTION, SOLUTION INTRAVENOUS PRN
Status: DISCONTINUED | OUTPATIENT
Start: 2024-09-14 | End: 2024-09-17 | Stop reason: HOSPADM

## 2024-09-14 RX ORDER — 0.9 % SODIUM CHLORIDE 0.9 %
1000 INTRAVENOUS SOLUTION INTRAVENOUS ONCE
Status: COMPLETED | OUTPATIENT
Start: 2024-09-14 | End: 2024-09-14

## 2024-09-14 RX ORDER — ONDANSETRON 4 MG/1
4 TABLET, ORALLY DISINTEGRATING ORAL EVERY 8 HOURS PRN
Status: DISCONTINUED | OUTPATIENT
Start: 2024-09-14 | End: 2024-09-14 | Stop reason: CLARIF

## 2024-09-14 RX ORDER — PROCHLORPERAZINE MALEATE 5 MG
10 TABLET ORAL EVERY 8 HOURS PRN
Status: DISCONTINUED | OUTPATIENT
Start: 2024-09-14 | End: 2024-09-17 | Stop reason: HOSPADM

## 2024-09-14 RX ORDER — ASPIRIN 81 MG/1
81 TABLET, CHEWABLE ORAL DAILY
Status: DISCONTINUED | OUTPATIENT
Start: 2024-09-15 | End: 2024-09-17 | Stop reason: HOSPADM

## 2024-09-14 RX ORDER — SODIUM CHLORIDE 0.9 % (FLUSH) 0.9 %
5-40 SYRINGE (ML) INJECTION EVERY 12 HOURS SCHEDULED
Status: DISCONTINUED | OUTPATIENT
Start: 2024-09-14 | End: 2024-09-17 | Stop reason: HOSPADM

## 2024-09-14 RX ORDER — METOPROLOL SUCCINATE 25 MG/1
25 TABLET, EXTENDED RELEASE ORAL DAILY
Status: DISCONTINUED | OUTPATIENT
Start: 2024-09-15 | End: 2024-09-17 | Stop reason: HOSPADM

## 2024-09-14 RX ORDER — PROCHLORPERAZINE EDISYLATE 5 MG/ML
10 INJECTION INTRAMUSCULAR; INTRAVENOUS EVERY 6 HOURS PRN
Status: DISCONTINUED | OUTPATIENT
Start: 2024-09-14 | End: 2024-09-17 | Stop reason: HOSPADM

## 2024-09-14 RX ADMIN — Medication 10 ML: at 20:58

## 2024-09-14 RX ADMIN — TAMSULOSIN HYDROCHLORIDE 0.4 MG: 0.4 CAPSULE ORAL at 20:56

## 2024-09-14 RX ADMIN — ENOXAPARIN SODIUM 40 MG: 100 INJECTION SUBCUTANEOUS at 20:56

## 2024-09-14 RX ADMIN — SODIUM CHLORIDE 1000 ML: 9 INJECTION, SOLUTION INTRAVENOUS at 13:44

## 2024-09-14 RX ADMIN — POLYETHYLENE GLYCOL 3350 17 G: 17 POWDER, FOR SOLUTION ORAL at 20:57

## 2024-09-14 RX ADMIN — PREGABALIN 50 MG: 25 CAPSULE ORAL at 20:56

## 2024-09-14 ASSESSMENT — PAIN SCALES - GENERAL: PAINLEVEL_OUTOF10: 5

## 2024-09-15 LAB
ANION GAP SERPL CALCULATED.3IONS-SCNC: 11 MMOL/L (ref 7–16)
BASOPHILS # BLD: 0 K/UL (ref 0–0.2)
BASOPHILS NFR BLD: 0 % (ref 0–2)
BUN SERPL-MCNC: 23 MG/DL (ref 6–23)
CALCIUM SERPL-MCNC: 8.6 MG/DL (ref 8.6–10.2)
CHLORIDE SERPL-SCNC: 104 MMOL/L (ref 98–107)
CO2 SERPL-SCNC: 24 MMOL/L (ref 22–29)
CREAT SERPL-MCNC: 0.9 MG/DL (ref 0.7–1.2)
EOSINOPHIL # BLD: 0 K/UL (ref 0.05–0.5)
EOSINOPHILS RELATIVE PERCENT: 0 % (ref 0–6)
ERYTHROCYTE [DISTWIDTH] IN BLOOD BY AUTOMATED COUNT: 17.9 % (ref 11.5–15)
GFR, ESTIMATED: 83 ML/MIN/1.73M2
GLUCOSE SERPL-MCNC: 126 MG/DL (ref 74–99)
HCT VFR BLD AUTO: 46.1 % (ref 37–54)
HGB BLD-MCNC: 13.9 G/DL (ref 12.5–16.5)
LYMPHOCYTES NFR BLD: 0.5 K/UL (ref 1.5–4)
LYMPHOCYTES RELATIVE PERCENT: 4 % (ref 20–42)
MCH RBC QN AUTO: 27.6 PG (ref 26–35)
MCHC RBC AUTO-ENTMCNC: 30.2 G/DL (ref 32–34.5)
MCV RBC AUTO: 91.7 FL (ref 80–99.9)
MONOCYTES NFR BLD: 15 % (ref 2–12)
MONOCYTES NFR BLD: 2.12 K/UL (ref 0.1–0.95)
NEUTROPHILS NFR BLD: 82 % (ref 43–80)
NEUTS SEG NFR BLD: 11.58 K/UL (ref 1.8–7.3)
PLATELET, FLUORESCENCE: 128 K/UL (ref 130–450)
PMV BLD AUTO: 13.6 FL (ref 7–12)
POTASSIUM SERPL-SCNC: 4.3 MMOL/L (ref 3.5–5)
RBC # BLD AUTO: 5.03 M/UL (ref 3.8–5.8)
RBC # BLD: NORMAL 10*6/UL
SODIUM SERPL-SCNC: 139 MMOL/L (ref 132–146)
WBC OTHER # BLD: 14.2 K/UL (ref 4.5–11.5)

## 2024-09-15 PROCEDURE — 6370000000 HC RX 637 (ALT 250 FOR IP): Performed by: INTERNAL MEDICINE

## 2024-09-15 PROCEDURE — 85025 COMPLETE CBC W/AUTO DIFF WBC: CPT

## 2024-09-15 PROCEDURE — 2580000003 HC RX 258: Performed by: INTERNAL MEDICINE

## 2024-09-15 PROCEDURE — G0378 HOSPITAL OBSERVATION PER HR: HCPCS

## 2024-09-15 PROCEDURE — 99222 1ST HOSP IP/OBS MODERATE 55: CPT | Performed by: HOSPITALIST

## 2024-09-15 PROCEDURE — 6360000002 HC RX W HCPCS: Performed by: INTERNAL MEDICINE

## 2024-09-15 PROCEDURE — 36415 COLL VENOUS BLD VENIPUNCTURE: CPT

## 2024-09-15 PROCEDURE — 96372 THER/PROPH/DIAG INJ SC/IM: CPT

## 2024-09-15 PROCEDURE — 80048 BASIC METABOLIC PNL TOTAL CA: CPT

## 2024-09-15 PROCEDURE — 2500000003 HC RX 250 WO HCPCS: Performed by: FAMILY MEDICINE

## 2024-09-15 RX ADMIN — Medication 1 TABLET: at 09:13

## 2024-09-15 RX ADMIN — PREGABALIN 50 MG: 25 CAPSULE ORAL at 20:06

## 2024-09-15 RX ADMIN — TAMSULOSIN HYDROCHLORIDE 0.4 MG: 0.4 CAPSULE ORAL at 20:04

## 2024-09-15 RX ADMIN — METOPROLOL SUCCINATE 25 MG: 25 TABLET, EXTENDED RELEASE ORAL at 09:17

## 2024-09-15 RX ADMIN — ANTI-FUNGAL POWDER MICONAZOLE NITRATE TALC FREE: 1.42 POWDER TOPICAL at 20:05

## 2024-09-15 RX ADMIN — Medication 10 ML: at 20:05

## 2024-09-15 RX ADMIN — CHOLECALCIFEROL TAB 125 MCG (5000 UNIT) 5000 UNITS: 125 TAB at 09:14

## 2024-09-15 RX ADMIN — CLOPIDOGREL BISULFATE 75 MG: 75 TABLET ORAL at 09:13

## 2024-09-15 RX ADMIN — ANTI-FUNGAL POWDER MICONAZOLE NITRATE TALC FREE: 1.42 POWDER TOPICAL at 14:06

## 2024-09-15 RX ADMIN — PREGABALIN 50 MG: 25 CAPSULE ORAL at 14:04

## 2024-09-15 RX ADMIN — Medication 10 ML: at 09:21

## 2024-09-15 RX ADMIN — CYANOCOBALAMIN TAB 1000 MCG 250 MCG: 1000 TAB at 09:13

## 2024-09-15 RX ADMIN — ACETAMINOPHEN 650 MG: 325 TABLET ORAL at 20:04

## 2024-09-15 RX ADMIN — DOCUSATE SODIUM 100 MG: 100 CAPSULE, LIQUID FILLED ORAL at 09:13

## 2024-09-15 RX ADMIN — CALCIUM 500 MG: 500 TABLET ORAL at 09:14

## 2024-09-15 RX ADMIN — PREGABALIN 50 MG: 25 CAPSULE ORAL at 09:14

## 2024-09-15 RX ADMIN — ALLOPURINOL 100 MG: 100 TABLET ORAL at 09:13

## 2024-09-15 RX ADMIN — ENOXAPARIN SODIUM 40 MG: 100 INJECTION SUBCUTANEOUS at 20:05

## 2024-09-15 RX ADMIN — ASPIRIN 81 MG CHEWABLE TABLET 81 MG: 81 TABLET CHEWABLE at 09:13

## 2024-09-15 RX ADMIN — FUROSEMIDE 40 MG: 40 TABLET ORAL at 09:13

## 2024-09-15 ASSESSMENT — PAIN SCALES - GENERAL
PAINLEVEL_OUTOF10: 1
PAINLEVEL_OUTOF10: 3

## 2024-09-15 ASSESSMENT — PAIN DESCRIPTION - ORIENTATION: ORIENTATION: RIGHT;LEFT

## 2024-09-15 ASSESSMENT — PAIN DESCRIPTION - DESCRIPTORS: DESCRIPTORS: ACHING

## 2024-09-15 ASSESSMENT — PAIN DESCRIPTION - LOCATION: LOCATION: FOOT

## 2024-09-16 PROCEDURE — 6370000000 HC RX 637 (ALT 250 FOR IP): Performed by: INTERNAL MEDICINE

## 2024-09-16 PROCEDURE — 6360000002 HC RX W HCPCS: Performed by: INTERNAL MEDICINE

## 2024-09-16 PROCEDURE — 2580000003 HC RX 258: Performed by: INTERNAL MEDICINE

## 2024-09-16 PROCEDURE — G0378 HOSPITAL OBSERVATION PER HR: HCPCS

## 2024-09-16 PROCEDURE — 99232 SBSQ HOSP IP/OBS MODERATE 35: CPT | Performed by: INTERNAL MEDICINE

## 2024-09-16 PROCEDURE — 96372 THER/PROPH/DIAG INJ SC/IM: CPT

## 2024-09-16 RX ADMIN — METOPROLOL SUCCINATE 25 MG: 25 TABLET, EXTENDED RELEASE ORAL at 08:05

## 2024-09-16 RX ADMIN — PREGABALIN 50 MG: 25 CAPSULE ORAL at 20:58

## 2024-09-16 RX ADMIN — DOCUSATE SODIUM 100 MG: 100 CAPSULE, LIQUID FILLED ORAL at 08:04

## 2024-09-16 RX ADMIN — ANTI-FUNGAL POWDER MICONAZOLE NITRATE TALC FREE: 1.42 POWDER TOPICAL at 20:58

## 2024-09-16 RX ADMIN — PREGABALIN 50 MG: 25 CAPSULE ORAL at 13:57

## 2024-09-16 RX ADMIN — ENOXAPARIN SODIUM 40 MG: 100 INJECTION SUBCUTANEOUS at 20:58

## 2024-09-16 RX ADMIN — ASPIRIN 81 MG CHEWABLE TABLET 81 MG: 81 TABLET CHEWABLE at 08:01

## 2024-09-16 RX ADMIN — PREGABALIN 50 MG: 25 CAPSULE ORAL at 08:01

## 2024-09-16 RX ADMIN — Medication 1 TABLET: at 08:03

## 2024-09-16 RX ADMIN — CALCIUM 500 MG: 500 TABLET ORAL at 08:04

## 2024-09-16 RX ADMIN — CLOPIDOGREL BISULFATE 75 MG: 75 TABLET ORAL at 08:04

## 2024-09-16 RX ADMIN — ALLOPURINOL 100 MG: 100 TABLET ORAL at 08:05

## 2024-09-16 RX ADMIN — POLYETHYLENE GLYCOL 3350 17 G: 17 POWDER, FOR SOLUTION ORAL at 08:08

## 2024-09-16 RX ADMIN — TAMSULOSIN HYDROCHLORIDE 0.4 MG: 0.4 CAPSULE ORAL at 20:57

## 2024-09-16 RX ADMIN — CYANOCOBALAMIN TAB 1000 MCG 250 MCG: 1000 TAB at 08:01

## 2024-09-16 RX ADMIN — ANTI-FUNGAL POWDER MICONAZOLE NITRATE TALC FREE: 1.42 POWDER TOPICAL at 08:09

## 2024-09-16 RX ADMIN — FUROSEMIDE 40 MG: 40 TABLET ORAL at 08:04

## 2024-09-16 RX ADMIN — CHOLECALCIFEROL TAB 125 MCG (5000 UNIT) 5000 UNITS: 125 TAB at 11:54

## 2024-09-16 RX ADMIN — Medication 10 ML: at 08:07

## 2024-09-17 VITALS
OXYGEN SATURATION: 99 % | DIASTOLIC BLOOD PRESSURE: 88 MMHG | HEART RATE: 74 BPM | BODY MASS INDEX: 32.8 KG/M2 | RESPIRATION RATE: 18 BRPM | TEMPERATURE: 97.4 F | SYSTOLIC BLOOD PRESSURE: 131 MMHG | WEIGHT: 209 LBS | HEIGHT: 67 IN

## 2024-09-17 PROCEDURE — 6370000000 HC RX 637 (ALT 250 FOR IP): Performed by: INTERNAL MEDICINE

## 2024-09-17 PROCEDURE — 2580000003 HC RX 258: Performed by: INTERNAL MEDICINE

## 2024-09-17 PROCEDURE — G0378 HOSPITAL OBSERVATION PER HR: HCPCS

## 2024-09-17 RX ORDER — ALLOPURINOL 100 MG/1
100 TABLET ORAL DAILY
Qty: 30 TABLET | Refills: 0
Start: 2024-09-18

## 2024-09-17 RX ADMIN — ANTI-FUNGAL POWDER MICONAZOLE NITRATE TALC FREE: 1.42 POWDER TOPICAL at 08:49

## 2024-09-17 RX ADMIN — CLOPIDOGREL BISULFATE 75 MG: 75 TABLET ORAL at 08:48

## 2024-09-17 RX ADMIN — METOPROLOL SUCCINATE 25 MG: 25 TABLET, EXTENDED RELEASE ORAL at 08:48

## 2024-09-17 RX ADMIN — PREGABALIN 50 MG: 25 CAPSULE ORAL at 08:48

## 2024-09-17 RX ADMIN — Medication 1 TABLET: at 08:48

## 2024-09-17 RX ADMIN — ALLOPURINOL 100 MG: 100 TABLET ORAL at 08:48

## 2024-09-17 RX ADMIN — CYANOCOBALAMIN TAB 1000 MCG 250 MCG: 1000 TAB at 08:48

## 2024-09-17 RX ADMIN — CALCIUM 500 MG: 500 TABLET ORAL at 08:48

## 2024-09-17 RX ADMIN — ASPIRIN 81 MG CHEWABLE TABLET 81 MG: 81 TABLET CHEWABLE at 08:48

## 2024-09-17 RX ADMIN — PREGABALIN 50 MG: 25 CAPSULE ORAL at 12:30

## 2024-09-17 RX ADMIN — Medication 10 ML: at 08:49

## 2024-09-17 RX ADMIN — CHOLECALCIFEROL TAB 125 MCG (5000 UNIT) 5000 UNITS: 125 TAB at 08:48

## 2024-09-17 RX ADMIN — DOCUSATE SODIUM 100 MG: 100 CAPSULE, LIQUID FILLED ORAL at 08:48

## 2024-09-17 RX ADMIN — FUROSEMIDE 40 MG: 40 TABLET ORAL at 08:48

## 2024-09-18 ENCOUNTER — CARE COORDINATION (OUTPATIENT)
Dept: CARE COORDINATION | Age: 89
End: 2024-09-18

## 2024-09-19 ENCOUNTER — CARE COORDINATION (OUTPATIENT)
Dept: CARE COORDINATION | Age: 89
End: 2024-09-19

## 2024-09-19 DIAGNOSIS — L89.152 SACRAL DECUBITUS ULCER, STAGE II (HCC): Primary | ICD-10-CM

## 2024-09-19 PROCEDURE — 1111F DSCHRG MED/CURRENT MED MERGE: CPT | Performed by: INTERNAL MEDICINE

## 2024-09-23 ASSESSMENT — ENCOUNTER SYMPTOMS
GASTROINTESTINAL NEGATIVE: 1
EYE DISCHARGE: 0
EYE REDNESS: 0
EYE ITCHING: 0

## 2024-09-23 ASSESSMENT — VISUAL ACUITY: OU: 1

## 2024-09-26 ENCOUNTER — CARE COORDINATION (OUTPATIENT)
Dept: CARE COORDINATION | Age: 89
End: 2024-09-26

## 2024-09-26 ENCOUNTER — OFFICE VISIT (OUTPATIENT)
Dept: PRIMARY CARE CLINIC | Age: 89
End: 2024-09-26

## 2024-09-26 VITALS
HEIGHT: 67 IN | WEIGHT: 209 LBS | HEART RATE: 101 BPM | DIASTOLIC BLOOD PRESSURE: 64 MMHG | TEMPERATURE: 98.6 F | BODY MASS INDEX: 32.8 KG/M2 | OXYGEN SATURATION: 97 % | SYSTOLIC BLOOD PRESSURE: 118 MMHG

## 2024-09-26 DIAGNOSIS — M10.9 GOUTY ARTHRITIS OF TOE OF RIGHT FOOT: ICD-10-CM

## 2024-09-26 DIAGNOSIS — R26.9 GAIT DISORDER: ICD-10-CM

## 2024-09-26 DIAGNOSIS — Z09 HOSPITAL DISCHARGE FOLLOW-UP: Primary | ICD-10-CM

## 2024-09-26 DIAGNOSIS — I48.91 ATRIAL FIBRILLATION AND FLUTTER (HCC): ICD-10-CM

## 2024-09-26 DIAGNOSIS — I48.0 PAROXYSMAL ATRIAL FIBRILLATION (HCC): ICD-10-CM

## 2024-09-26 DIAGNOSIS — I50.42 CHRONIC COMBINED SYSTOLIC AND DIASTOLIC CONGESTIVE HEART FAILURE (HCC): ICD-10-CM

## 2024-09-26 DIAGNOSIS — G47.33 OSA (OBSTRUCTIVE SLEEP APNEA): ICD-10-CM

## 2024-09-26 DIAGNOSIS — E78.2 MIXED HYPERLIPIDEMIA: ICD-10-CM

## 2024-09-26 DIAGNOSIS — M1A.00X0 IDIOPATHIC CHRONIC GOUT WITHOUT TOPHUS, UNSPECIFIED SITE: ICD-10-CM

## 2024-09-26 DIAGNOSIS — R73.02 GLUCOSE INTOLERANCE (IMPAIRED GLUCOSE TOLERANCE): ICD-10-CM

## 2024-09-26 DIAGNOSIS — Z95.818 PRESENCE OF WATCHMAN LEFT ATRIAL APPENDAGE CLOSURE DEVICE: ICD-10-CM

## 2024-09-26 DIAGNOSIS — I48.92 ATRIAL FIBRILLATION AND FLUTTER (HCC): ICD-10-CM

## 2024-09-26 DIAGNOSIS — Z71.89 ACP (ADVANCE CARE PLANNING): ICD-10-CM

## 2024-09-26 RX ORDER — ALLOPURINOL 100 MG/1
100 TABLET ORAL DAILY
Qty: 30 TABLET | Refills: 0 | Status: SHIPPED | OUTPATIENT
Start: 2024-09-26

## 2024-09-26 RX ORDER — METOPROLOL SUCCINATE 25 MG/1
25 TABLET, EXTENDED RELEASE ORAL DAILY
Qty: 90 TABLET | Refills: 3 | Status: SHIPPED | OUTPATIENT
Start: 2024-09-26

## 2024-10-02 ENCOUNTER — TELEPHONE (OUTPATIENT)
Dept: CARDIOLOGY CLINIC | Age: 89
End: 2024-10-02

## 2024-10-02 NOTE — TELEPHONE ENCOUNTER
I called & spoke to Deyvi's wife Debbie to inform her of Deyvi's 6 month Watchman FU Appt w/ FRANCO Moran.  Location: [facility].  [address] [place] [address].  [facility].   Date & Time of Appt: 11/19/24 at 11:30 am  Pt instructed to bring insurance card, photo ID, & list of medications w/ him / her.

## 2024-10-03 ENCOUNTER — CARE COORDINATION (OUTPATIENT)
Dept: CARE COORDINATION | Age: 89
End: 2024-10-03

## 2024-10-03 NOTE — CARE COORDINATION
Care Transitions Note    Follow Up Call     First attempt made to contact patient for Subsequent Care Transition call. CTN left HIPAA compliant message requesting return call.     Outreach Attempts:   HIPAA compliant voicemail left for patient.     Follow Up Appointment:   Future Appointments         Provider Specialty Dept Phone    11/19/2024 11:30 AM Lisa Agosto PA Cardiology [phone]    1/7/2025 11:45 AM Heather Ascencio MD Primary Care [phone]            Plan for follow-up call in 6-10 days based on severity of symptoms and risk factors. Plan for next call:  symptom management-weakness, f/u on HHC therapy, concerns?         Jaida Sauer LPN

## 2024-10-11 ENCOUNTER — CARE COORDINATION (OUTPATIENT)
Dept: CARE COORDINATION | Age: 89
End: 2024-10-11

## 2024-10-11 NOTE — CARE COORDINATION
Care Transitions Note    Follow Up Call     Attempted to reach patient for transitions of care follow up.  Unable to reach patient.      Outreach Attempts: # 1  HIPAA compliant voicemail left for spouse/partner .     Care Summary Note: 1st attempt     Follow Up Appointment:   Future Appointments         Provider Specialty Dept Phone    11/19/2024 11:30 AM Lisa Agosto PA Cardiology [phone]    1/7/2025 11:45 AM Heather Ascencio MD Primary Care [phone]            Plan for follow-up call in 2-5 days based on severity of symptoms and risk factors. Plan for next call: symptom management-weakness, f/u on C therapy, concerns?            Lucia Mcghee LPN

## 2024-10-13 PROBLEM — L89.302 PRESSURE INJURY OF BUTTOCK, STAGE 2 (HCC): Status: ACTIVE | Noted: 2024-10-13

## 2024-10-16 ENCOUNTER — CARE COORDINATION (OUTPATIENT)
Dept: CARE COORDINATION | Age: 89
End: 2024-10-16

## 2024-10-16 NOTE — CARE COORDINATION
Care Transitions Note    Final Call     Patient Current Location:  Home: [address] [place] [id number]    Care Transition Nurse contacted the spouse/partner  by telephone. Verified name and  as identifiers.    Patient graduated from the Care Transitions program on 10/16/24.  Patient/family verbalizes confidence in the ability to self-manage at this time..      Advance Care Planning:   Does patient have an Advance Directive: reviewed and current.    Handoff:   Patient was not referred to the [facility] team due to no additional needs identified.       Care Summary Note: Spoke with patient wife (Debbie) today 10/16/24 for final YIMI/hospital discharge follow up for sacral decubitus, stage II.     Debbie reports sacral decubitus has healed and having no issues. [facility] with [facility].     Denies any shortness of breath, chest pain, chest discomfort, nausea, vomiting, chills or fever. Denies any issues with elimination pattern.     Confirmed patient completed HFU appt with Dr. LASHELL Ascencio (PCP) on 24. Debbie denies any needs or concerns and in agreement to final call. CTN signing off.     Assessments:  Care Transitions Subsequent and Final Call    Subsequent and Final Calls  Do you have any ongoing symptoms?: No  Have your medications changed?: No  Do you have any questions related to your medications?: No  Do you currently have any active services?: Yes  Are you currently active with any services?: Home Health  Do you have any needs or concerns that I can assist you with?: No  Identified Barriers: None  Care Transitions Interventions     Other Therapy Services: Completed     Physical Therapy: Completed      Registered Dietician: Declined DME Assistance: Completed   Other Interventions:              Upcoming Appointments:    Future Appointments         Provider Specialty Dept Phone    2024 11:30 AM Lisa Agosto PA Cardiology [phone]    2025 11:45 AM Heather Ascencio MD Primary

## 2024-11-14 DIAGNOSIS — I50.43 ACUTE ON CHRONIC COMBINED SYSTOLIC AND DIASTOLIC CHF (CONGESTIVE HEART FAILURE) (HCC): ICD-10-CM

## 2024-11-15 RX ORDER — FUROSEMIDE 40 MG/1
TABLET ORAL
Qty: 30 TABLET | Refills: 0 | Status: SHIPPED | OUTPATIENT
Start: 2024-11-15

## 2024-11-19 ENCOUNTER — OFFICE VISIT (OUTPATIENT)
Dept: CARDIOLOGY CLINIC | Age: 88
End: 2024-11-19

## 2024-11-19 VITALS — HEART RATE: 72 BPM | DIASTOLIC BLOOD PRESSURE: 70 MMHG | SYSTOLIC BLOOD PRESSURE: 110 MMHG | RESPIRATION RATE: 16 BRPM

## 2024-11-19 DIAGNOSIS — I48.19 PERSISTENT ATRIAL FIBRILLATION (HCC): Primary | ICD-10-CM

## 2024-11-19 DIAGNOSIS — Z95.818 PRESENCE OF WATCHMAN LEFT ATRIAL APPENDAGE CLOSURE DEVICE: ICD-10-CM

## 2024-11-19 NOTE — PATIENT INSTRUCTIONS
Discontinue plavix. Continue aspirin 81 mg daily    Follow up for CHEKO and visit with Dr. Bates in May 2025. Cee will be in touch with dates once scheduled    Follow up with Nereyda Ascencio and Truman as scheduled

## 2024-11-19 NOTE — PROGRESS NOTES
[facility]  Visit Note      Patient name: Deyvi Rose    Reason for visit: Watchman follow up    Primary Care Physician: Heather Ascencio MD    Date of service: 11/19/2024    Chief Complaint: Watchman follow up    HPI: Mr. Rose is a 89 yo male who presents for follow up s/p LAAO using Watchman on 5/20/24. He is doing well since last visit. He denies chest pain, sob/black, orthopnea, PND, LE edema, palpitations or syncope. No other complaints voiced today.    Allergies:   Allergies   Allergen Reactions    Yellow Jacket Venom Swelling       Home medications:    Current Outpatient Medications   Medication Sig Dispense Refill    furosemide (LASIX) 40 MG tablet TAKE 1 TABLET BY MOUTH EVERY OTHER DAY DO  NOT  START  UNTIL  09/14/2024 (Patient taking differently: Take 1 tablet by mouth every other day 20 MG EVERY OTHER DAY) 30 tablet 0    allopurinol (ZYLOPRIM) 100 MG tablet Take 1 tablet by mouth daily 30 tablet 0    metoprolol succinate (TOPROL XL) 25 MG extended release tablet Take 1 tablet by mouth daily 90 tablet 3    clopidogrel (PLAVIX) 75 MG tablet Take 1 tablet by mouth daily 90 tablet 1    aspirin 81 MG chewable tablet Take 1 tablet by mouth daily 90 tablet 3    pregabalin (LYRICA) 50 MG capsule Take 1 capsule by mouth 3 times daily for 120 days. Max Daily Amount: 150 mg 90 capsule 3    Multiple Vitamins-Minerals (THERAPEUTIC MULTIVITAMIN-MINERALS) tablet Take 1 tablet by mouth daily      calcium carbonate 600 MG TABS tablet Take 1 tablet by mouth daily      vitamin D (CHOLECALCIFEROL) 125 MCG (5000 UT) CAPS capsule Take 1 capsule by mouth daily      tamsulosin (FLOMAX) 0.4 MG capsule Take 1 capsule by mouth at bedtime 90 capsule 1    vitamin B-12 (CYANOCOBALAMIN) 250 MCG tablet Take 1 tablet by mouth daily       No current facility-administered medications for this visit.       Past Medical History:  Past Medical History:   Diagnosis Date    Arthritis     Atrial fibrillation, chronic (HCC)

## 2024-12-01 DIAGNOSIS — G62.9 NEUROPATHY: ICD-10-CM

## 2024-12-02 RX ORDER — PREGABALIN 50 MG/1
50 CAPSULE ORAL 3 TIMES DAILY
Qty: 90 CAPSULE | Refills: 5 | Status: SHIPPED | OUTPATIENT
Start: 2024-12-02 | End: 2025-05-31

## 2024-12-02 NOTE — TELEPHONE ENCOUNTER
Name of Medication(s) Requested:  Requested Prescriptions     Pending Prescriptions Disp Refills    pregabalin (LYRICA) 50 MG capsule [Pharmacy Med Name: Pregabalin 50 MG Oral Capsule] 90 capsule 0     Sig: TAKE 1 CAPSULE BY MOUTH THREE TIMES DAILY MAX  DAILY AMOUNT OF 3 CAPSULES.       Medication is on current medication list Yes    Dosage and directions were verified? Yes    Quantity verified: 90 day supply     Pharmacy Verified?  Yes    Last Appointment:  9/26/2024    Future appts:  Future Appointments   Date Time Provider Department Center   1/7/2025 11:45 AM Heather Ascencio MD [facility] DEP        (If no appt send self scheduling link. .REFILLAPPT)  Scheduling request sent?     [] Yes  [x] No    Does patient need updated?  [] Yes  [x] No

## 2024-12-18 NOTE — PROGRESS NOTES
Deyvi Rose (:  1934) is a 89 y.o. male,Established patient, here for evaluation of the following chief complaint(s):  3 Month Follow-Up (He was admitted from 2024-2024 for Stage II sacral decubitus ulcer. He was evaluated and treated on 2024 at [facility] for status post Left Atrial Appendage Occlusion Device (Watchman). He currently has [facility] for ongoing deficits in functional mobility. Labs ordered 2024 were not completed.), Dysuria (He was treated with Cephalexin 500 mg  BID (#14) for 7 days for a UTI. He was also prescribed phenazopyrid 200 mg TID (#6) for 2 days. He states it eased the burning but he continues to have burning intermittently. ), and Physical Therapy Treatment (He is accompanied by his wife today who states physical therapy has recommended a \"sit to stand lift\". She would like a prescription in order to submit to the insurance company.)      Subjective   SUBJECTIVE/OBJECTIVE:  HPI  Sleep Apnea  Doing well use 8 h feeling well getting supplies    A Fib     Heart rate well-controlled patient currently on metoprolol he had Watchman device put in he is currently only on aspirin  Decreased ambulation  Progressive weakness has been doing physical therapy is not getting any better.  He needs more physical therapy in my opinion and he also needs to have a sit to stand chair to help him with getting up he is currently Balaji lift  Peripheral neuropathy  Was on Lyrica 50 mg p.o. 3 times daily was doing well now is not doing well he started to have symptoms again.  He has no side effects with Lyrica.  Has been compliant with using the medications.  Review of Systems   Constitutional: Negative.  Negative for activity change, appetite change, chills, fatigue and fever.   HENT: Negative.  Negative for congestion, ear pain, facial swelling, hearing loss, postnasal drip, rhinorrhea and sinus pain.    Eyes: Negative.  Negative for pain, discharge, redness and

## 2025-01-07 ENCOUNTER — OFFICE VISIT (OUTPATIENT)
Dept: PRIMARY CARE CLINIC | Age: 89
End: 2025-01-07
Payer: MEDICARE

## 2025-01-07 VITALS
HEART RATE: 60 BPM | TEMPERATURE: 98.1 F | OXYGEN SATURATION: 99 % | HEIGHT: 67 IN | SYSTOLIC BLOOD PRESSURE: 110 MMHG | DIASTOLIC BLOOD PRESSURE: 78 MMHG | BODY MASS INDEX: 32.73 KG/M2

## 2025-01-07 DIAGNOSIS — G47.33 OSA (OBSTRUCTIVE SLEEP APNEA): ICD-10-CM

## 2025-01-07 DIAGNOSIS — I50.42 CHRONIC COMBINED SYSTOLIC AND DIASTOLIC CONGESTIVE HEART FAILURE (HCC): ICD-10-CM

## 2025-01-07 DIAGNOSIS — E78.2 MIXED HYPERLIPIDEMIA: ICD-10-CM

## 2025-01-07 DIAGNOSIS — R26.9 GAIT DISORDER: Primary | ICD-10-CM

## 2025-01-07 DIAGNOSIS — I48.19 PERSISTENT ATRIAL FIBRILLATION (HCC): ICD-10-CM

## 2025-01-07 DIAGNOSIS — R73.02 GLUCOSE INTOLERANCE (IMPAIRED GLUCOSE TOLERANCE): ICD-10-CM

## 2025-01-07 DIAGNOSIS — G60.9 IDIOPATHIC PERIPHERAL NEUROPATHY: ICD-10-CM

## 2025-01-07 DIAGNOSIS — I50.43 ACUTE ON CHRONIC COMBINED SYSTOLIC AND DIASTOLIC CHF (CONGESTIVE HEART FAILURE) (HCC): ICD-10-CM

## 2025-01-07 PROCEDURE — 1159F MED LIST DOCD IN RCRD: CPT | Performed by: INTERNAL MEDICINE

## 2025-01-07 PROCEDURE — 1123F ACP DISCUSS/DSCN MKR DOCD: CPT | Performed by: INTERNAL MEDICINE

## 2025-01-07 PROCEDURE — 99214 OFFICE O/P EST MOD 30 MIN: CPT | Performed by: INTERNAL MEDICINE

## 2025-01-07 RX ORDER — CEPHALEXIN 500 MG/1
500 CAPSULE ORAL 4 TIMES DAILY
COMMUNITY
End: 2025-01-07

## 2025-01-07 RX ORDER — PREGABALIN 100 MG/1
100 CAPSULE ORAL 2 TIMES DAILY
Qty: 60 CAPSULE | Refills: 5 | Status: SHIPPED | OUTPATIENT
Start: 2025-01-07 | End: 2025-07-06

## 2025-01-07 ASSESSMENT — PATIENT HEALTH QUESTIONNAIRE - PHQ9
SUM OF ALL RESPONSES TO PHQ QUESTIONS 1-9: 0
SUM OF ALL RESPONSES TO PHQ9 QUESTIONS 1 & 2: 0
SUM OF ALL RESPONSES TO PHQ QUESTIONS 1-9: 0
SUM OF ALL RESPONSES TO PHQ QUESTIONS 1-9: 0
2. FEELING DOWN, DEPRESSED OR HOPELESS: NOT AT ALL
1. LITTLE INTEREST OR PLEASURE IN DOING THINGS: NOT AT ALL
SUM OF ALL RESPONSES TO PHQ QUESTIONS 1-9: 0

## 2025-01-30 DIAGNOSIS — G60.9 IDIOPATHIC PERIPHERAL NEUROPATHY: ICD-10-CM

## 2025-01-30 RX ORDER — PREGABALIN 100 MG/1
100 CAPSULE ORAL 2 TIMES DAILY
Qty: 60 CAPSULE | Refills: 5 | Status: SHIPPED | OUTPATIENT
Start: 2025-01-30 | End: 2025-07-29

## 2025-03-24 ENCOUNTER — APPOINTMENT (OUTPATIENT)
Dept: GENERAL RADIOLOGY | Age: 89
DRG: 193 | End: 2025-03-24
Payer: MEDICARE

## 2025-03-24 ENCOUNTER — HOSPITAL ENCOUNTER (INPATIENT)
Age: 89
LOS: 2 days | Discharge: HOME HEALTH | DRG: 193 | End: 2025-03-26
Admitting: INTERNAL MEDICINE
Payer: MEDICARE

## 2025-03-24 DIAGNOSIS — J12.1 PNEUMONIA DUE TO RESPIRATORY SYNCYTIAL VIRUS (RSV): Primary | ICD-10-CM

## 2025-03-24 DIAGNOSIS — J15.7 PNEUMONIA DUE TO MYCOPLASMA PNEUMONIAE, UNSPECIFIED LATERALITY, UNSPECIFIED PART OF LUNG: ICD-10-CM

## 2025-03-24 PROBLEM — J96.01 ACUTE RESPIRATORY FAILURE WITH HYPOXIA: Status: ACTIVE | Noted: 2025-03-24

## 2025-03-24 LAB
ALBUMIN SERPL-MCNC: 3.8 G/DL (ref 3.5–5.2)
ALP SERPL-CCNC: 102 U/L (ref 40–129)
ALT SERPL-CCNC: 82 U/L (ref 0–40)
ANION GAP SERPL CALCULATED.3IONS-SCNC: 13 MMOL/L (ref 7–16)
AST SERPL-CCNC: 41 U/L (ref 0–39)
BASOPHILS # BLD: 0.13 K/UL (ref 0–0.2)
BASOPHILS NFR BLD: 1 % (ref 0–2)
BILIRUB SERPL-MCNC: 0.7 MG/DL (ref 0–1.2)
BNP SERPL-MCNC: 3496 PG/ML (ref 0–450)
BUN SERPL-MCNC: 24 MG/DL (ref 6–23)
CALCIUM SERPL-MCNC: 9.2 MG/DL (ref 8.6–10.2)
CHLORIDE SERPL-SCNC: 100 MMOL/L (ref 98–107)
CO2 SERPL-SCNC: 29 MMOL/L (ref 22–29)
CREAT SERPL-MCNC: 0.6 MG/DL (ref 0.7–1.2)
EKG ATRIAL RATE: 375 BPM
EKG Q-T INTERVAL: 356 MS
EKG QRS DURATION: 92 MS
EKG QTC CALCULATION (BAZETT): 449 MS
EKG R AXIS: 14 DEGREES
EKG T AXIS: 0 DEGREES
EKG VENTRICULAR RATE: 96 BPM
EOSINOPHIL # BLD: 0 K/UL (ref 0.05–0.5)
EOSINOPHILS RELATIVE PERCENT: 0 % (ref 0–6)
ERYTHROCYTE [DISTWIDTH] IN BLOOD BY AUTOMATED COUNT: 16.3 % (ref 11.5–15)
FLUAV RNA RESP QL NAA+PROBE: NOT DETECTED
FLUBV RNA RESP QL NAA+PROBE: NOT DETECTED
GFR, ESTIMATED: >90 ML/MIN/1.73M2
GLUCOSE SERPL-MCNC: 161 MG/DL (ref 74–99)
HCT VFR BLD AUTO: 52.6 % (ref 37–54)
HGB BLD-MCNC: 16.4 G/DL (ref 12.5–16.5)
LYMPHOCYTES NFR BLD: 0.63 K/UL (ref 1.5–4)
LYMPHOCYTES RELATIVE PERCENT: 4 % (ref 20–42)
MCH RBC QN AUTO: 28.6 PG (ref 26–35)
MCHC RBC AUTO-ENTMCNC: 31.2 G/DL (ref 32–34.5)
MCV RBC AUTO: 91.8 FL (ref 80–99.9)
MONOCYTES NFR BLD: 1.51 K/UL (ref 0.1–0.95)
MONOCYTES NFR BLD: 11 % (ref 2–12)
MYELOCYTES ABSOLUTE COUNT: 0.13 K/UL
MYELOCYTES: 1 %
NEUTROPHILS NFR BLD: 83 % (ref 43–80)
NEUTS SEG NFR BLD: 11.92 K/UL (ref 1.8–7.3)
PLATELET CONFIRMATION: NORMAL
PLATELET, FLUORESCENCE: 78 K/UL (ref 130–450)
PMV BLD AUTO: ABNORMAL FL (ref 7–12)
POTASSIUM SERPL-SCNC: 3.9 MMOL/L (ref 3.5–5)
PROT SERPL-MCNC: 6.6 G/DL (ref 6.4–8.3)
RBC # BLD AUTO: 5.73 M/UL (ref 3.8–5.8)
RBC # BLD: ABNORMAL 10*6/UL
RSV BY PCR: DETECTED
SARS-COV-2 RNA RESP QL NAA+PROBE: NOT DETECTED
SODIUM SERPL-SCNC: 142 MMOL/L (ref 132–146)
SOURCE: NORMAL
SPECIMEN DESCRIPTION: NORMAL
SPECIMEN SOURCE: ABNORMAL
TROPONIN I SERPL HS-MCNC: 35 NG/L (ref 0–11)
TROPONIN I SERPL HS-MCNC: 37 NG/L (ref 0–11)
WBC OTHER # BLD: 14.3 K/UL (ref 4.5–11.5)

## 2025-03-24 PROCEDURE — 85025 COMPLETE CBC W/AUTO DIFF WBC: CPT

## 2025-03-24 PROCEDURE — 6370000000 HC RX 637 (ALT 250 FOR IP): Performed by: NURSE PRACTITIONER

## 2025-03-24 PROCEDURE — 6360000002 HC RX W HCPCS

## 2025-03-24 PROCEDURE — 83880 ASSAY OF NATRIURETIC PEPTIDE: CPT

## 2025-03-24 PROCEDURE — 87636 SARSCOV2 & INF A&B AMP PRB: CPT

## 2025-03-24 PROCEDURE — 71045 X-RAY EXAM CHEST 1 VIEW: CPT

## 2025-03-24 PROCEDURE — 2060000000 HC ICU INTERMEDIATE R&B

## 2025-03-24 PROCEDURE — 0202U NFCT DS 22 TRGT SARS-COV-2: CPT

## 2025-03-24 PROCEDURE — 80053 COMPREHEN METABOLIC PANEL: CPT

## 2025-03-24 PROCEDURE — 99285 EMERGENCY DEPT VISIT HI MDM: CPT

## 2025-03-24 PROCEDURE — 6370000000 HC RX 637 (ALT 250 FOR IP)

## 2025-03-24 PROCEDURE — 6360000002 HC RX W HCPCS: Performed by: NURSE PRACTITIONER

## 2025-03-24 PROCEDURE — 87634 RSV DNA/RNA AMP PROBE: CPT

## 2025-03-24 PROCEDURE — 84484 ASSAY OF TROPONIN QUANT: CPT

## 2025-03-24 PROCEDURE — 93005 ELECTROCARDIOGRAM TRACING: CPT

## 2025-03-24 PROCEDURE — 2500000003 HC RX 250 WO HCPCS

## 2025-03-24 PROCEDURE — 99222 1ST HOSP IP/OBS MODERATE 55: CPT | Performed by: INTERNAL MEDICINE

## 2025-03-24 PROCEDURE — 87040 BLOOD CULTURE FOR BACTERIA: CPT

## 2025-03-24 PROCEDURE — 93010 ELECTROCARDIOGRAM REPORT: CPT | Performed by: INTERNAL MEDICINE

## 2025-03-24 PROCEDURE — APPSS45 APP SPLIT SHARED TIME 31-45 MINUTES: Performed by: NURSE PRACTITIONER

## 2025-03-24 PROCEDURE — 94640 AIRWAY INHALATION TREATMENT: CPT

## 2025-03-24 RX ORDER — SODIUM CHLORIDE 9 MG/ML
INJECTION, SOLUTION INTRAVENOUS PRN
Status: DISCONTINUED | OUTPATIENT
Start: 2025-03-24 | End: 2025-03-27 | Stop reason: HOSPADM

## 2025-03-24 RX ORDER — IPRATROPIUM BROMIDE AND ALBUTEROL SULFATE 2.5; .5 MG/3ML; MG/3ML
2 SOLUTION RESPIRATORY (INHALATION) ONCE
Status: COMPLETED | OUTPATIENT
Start: 2025-03-24 | End: 2025-03-24

## 2025-03-24 RX ORDER — M-VIT,TX,IRON,MINS/CALC/FOLIC 27MG-0.4MG
1 TABLET ORAL DAILY
Status: DISCONTINUED | OUTPATIENT
Start: 2025-03-24 | End: 2025-03-27 | Stop reason: HOSPADM

## 2025-03-24 RX ORDER — MULTIVITAMIN WITH IRON
250 TABLET ORAL DAILY
Status: DISCONTINUED | OUTPATIENT
Start: 2025-03-24 | End: 2025-03-27 | Stop reason: HOSPADM

## 2025-03-24 RX ORDER — POLYETHYLENE GLYCOL 3350 17 G/17G
17 POWDER, FOR SOLUTION ORAL DAILY PRN
Status: DISCONTINUED | OUTPATIENT
Start: 2025-03-24 | End: 2025-03-27 | Stop reason: HOSPADM

## 2025-03-24 RX ORDER — ACETAMINOPHEN 325 MG/1
650 TABLET ORAL EVERY 6 HOURS PRN
Status: DISCONTINUED | OUTPATIENT
Start: 2025-03-24 | End: 2025-03-27 | Stop reason: HOSPADM

## 2025-03-24 RX ORDER — ASPIRIN 81 MG/1
81 TABLET, CHEWABLE ORAL DAILY
Status: DISCONTINUED | OUTPATIENT
Start: 2025-03-25 | End: 2025-03-27 | Stop reason: HOSPADM

## 2025-03-24 RX ORDER — FUROSEMIDE 20 MG/1
20 TABLET ORAL EVERY OTHER DAY
Status: DISCONTINUED | OUTPATIENT
Start: 2025-03-25 | End: 2025-03-27 | Stop reason: HOSPADM

## 2025-03-24 RX ORDER — IPRATROPIUM BROMIDE AND ALBUTEROL SULFATE 2.5; .5 MG/3ML; MG/3ML
1 SOLUTION RESPIRATORY (INHALATION) EVERY 4 HOURS PRN
Status: DISCONTINUED | OUTPATIENT
Start: 2025-03-24 | End: 2025-03-27 | Stop reason: HOSPADM

## 2025-03-24 RX ORDER — GUAIFENESIN 600 MG/1
600 TABLET, EXTENDED RELEASE ORAL 2 TIMES DAILY
Status: DISCONTINUED | OUTPATIENT
Start: 2025-03-24 | End: 2025-03-27 | Stop reason: HOSPADM

## 2025-03-24 RX ORDER — ALLOPURINOL 100 MG/1
100 TABLET ORAL DAILY
Status: DISCONTINUED | OUTPATIENT
Start: 2025-03-25 | End: 2025-03-27 | Stop reason: HOSPADM

## 2025-03-24 RX ORDER — PREDNISONE 10 MG/1
10 TABLET ORAL DAILY
Status: ON HOLD | COMMUNITY
End: 2025-03-26 | Stop reason: HOSPADM

## 2025-03-24 RX ORDER — ONDANSETRON 2 MG/ML
4 INJECTION INTRAMUSCULAR; INTRAVENOUS EVERY 6 HOURS PRN
Status: DISCONTINUED | OUTPATIENT
Start: 2025-03-24 | End: 2025-03-27 | Stop reason: HOSPADM

## 2025-03-24 RX ORDER — ACETAMINOPHEN 650 MG/1
650 SUPPOSITORY RECTAL EVERY 6 HOURS PRN
Status: DISCONTINUED | OUTPATIENT
Start: 2025-03-24 | End: 2025-03-27 | Stop reason: HOSPADM

## 2025-03-24 RX ORDER — TAMSULOSIN HYDROCHLORIDE 0.4 MG/1
0.4 CAPSULE ORAL NIGHTLY
Status: DISCONTINUED | OUTPATIENT
Start: 2025-03-24 | End: 2025-03-27 | Stop reason: HOSPADM

## 2025-03-24 RX ORDER — CALCIUM CARBONATE 500(1250)
500 TABLET ORAL DAILY
Status: DISCONTINUED | OUTPATIENT
Start: 2025-03-24 | End: 2025-03-27 | Stop reason: HOSPADM

## 2025-03-24 RX ORDER — ENOXAPARIN SODIUM 100 MG/ML
40 INJECTION SUBCUTANEOUS DAILY
Status: DISCONTINUED | OUTPATIENT
Start: 2025-03-24 | End: 2025-03-27 | Stop reason: HOSPADM

## 2025-03-24 RX ORDER — ONDANSETRON 4 MG/1
4 TABLET, ORALLY DISINTEGRATING ORAL EVERY 8 HOURS PRN
Status: DISCONTINUED | OUTPATIENT
Start: 2025-03-24 | End: 2025-03-27 | Stop reason: HOSPADM

## 2025-03-24 RX ORDER — PREGABALIN 100 MG/1
100 CAPSULE ORAL 2 TIMES DAILY
Status: DISCONTINUED | OUTPATIENT
Start: 2025-03-24 | End: 2025-03-27 | Stop reason: HOSPADM

## 2025-03-24 RX ORDER — IPRATROPIUM BROMIDE AND ALBUTEROL SULFATE 2.5; .5 MG/3ML; MG/3ML
1 SOLUTION RESPIRATORY (INHALATION)
Status: DISCONTINUED | OUTPATIENT
Start: 2025-03-25 | End: 2025-03-27 | Stop reason: HOSPADM

## 2025-03-24 RX ORDER — SODIUM CHLORIDE 0.9 % (FLUSH) 0.9 %
5-40 SYRINGE (ML) INJECTION EVERY 12 HOURS SCHEDULED
Status: DISCONTINUED | OUTPATIENT
Start: 2025-03-24 | End: 2025-03-27 | Stop reason: HOSPADM

## 2025-03-24 RX ORDER — SODIUM CHLORIDE 0.9 % (FLUSH) 0.9 %
5-40 SYRINGE (ML) INJECTION PRN
Status: DISCONTINUED | OUTPATIENT
Start: 2025-03-24 | End: 2025-03-27 | Stop reason: HOSPADM

## 2025-03-24 RX ORDER — METOPROLOL SUCCINATE 25 MG/1
25 TABLET, EXTENDED RELEASE ORAL DAILY
Status: DISCONTINUED | OUTPATIENT
Start: 2025-03-24 | End: 2025-03-27 | Stop reason: HOSPADM

## 2025-03-24 RX ORDER — FUROSEMIDE 40 MG/1
40 TABLET ORAL EVERY OTHER DAY
Status: DISCONTINUED | OUTPATIENT
Start: 2025-03-26 | End: 2025-03-27 | Stop reason: HOSPADM

## 2025-03-24 RX ADMIN — ENOXAPARIN SODIUM 40 MG: 100 INJECTION SUBCUTANEOUS at 21:26

## 2025-03-24 RX ADMIN — CALCIUM 500 MG: 500 TABLET ORAL at 21:27

## 2025-03-24 RX ADMIN — PREGABALIN 100 MG: 100 CAPSULE ORAL at 21:26

## 2025-03-24 RX ADMIN — AMOXICILLIN AND CLAVULANATE POTASSIUM 1 TABLET: 875; 125 TABLET, FILM COATED ORAL at 21:25

## 2025-03-24 RX ADMIN — WATER 60 MG: 1 INJECTION INTRAMUSCULAR; INTRAVENOUS; SUBCUTANEOUS at 21:52

## 2025-03-24 RX ADMIN — IPRATROPIUM BROMIDE AND ALBUTEROL SULFATE 1 DOSE: .5; 2.5 SOLUTION RESPIRATORY (INHALATION) at 21:35

## 2025-03-24 RX ADMIN — CYANOCOBALAMIN TAB 500 MCG 250 MCG: 500 TAB at 21:27

## 2025-03-24 RX ADMIN — METOPROLOL SUCCINATE 25 MG: 25 TABLET, EXTENDED RELEASE ORAL at 21:49

## 2025-03-24 RX ADMIN — CHOLECALCIFEROL TAB 125 MCG (5000 UNIT) 5000 UNITS: 125 TAB at 21:26

## 2025-03-24 RX ADMIN — TAMSULOSIN HYDROCHLORIDE 0.4 MG: 0.4 CAPSULE ORAL at 21:52

## 2025-03-24 RX ADMIN — IPRATROPIUM BROMIDE AND ALBUTEROL SULFATE 2 DOSE: .5; 2.5 SOLUTION RESPIRATORY (INHALATION) at 13:26

## 2025-03-24 RX ADMIN — Medication 1 TABLET: at 21:25

## 2025-03-24 RX ADMIN — GUAIFENESIN 600 MG: 600 TABLET, EXTENDED RELEASE ORAL at 21:26

## 2025-03-24 ASSESSMENT — ENCOUNTER SYMPTOMS
SHORTNESS OF BREATH: 1
DIARRHEA: 0
COUGH: 1
RHINORRHEA: 1
NAUSEA: 0
VOMITING: 0
ABDOMINAL PAIN: 0

## 2025-03-24 ASSESSMENT — LIFESTYLE VARIABLES
HOW MANY STANDARD DRINKS CONTAINING ALCOHOL DO YOU HAVE ON A TYPICAL DAY: PATIENT DOES NOT DRINK
HOW OFTEN DO YOU HAVE A DRINK CONTAINING ALCOHOL: NEVER

## 2025-03-24 NOTE — ED PROVIDER NOTES
[facility] EMERGENCY DEPARTMENT  EMERGENCY DEPARTMENT ENCOUNTER        Pt Name: Deyvi Rose  MRN: [id number]  Birthdate 4/14/1934  Date of evaluation: 3/24/2025  Provider: Ta Cheung DO  PCP: Heather Ascencio MD  Note Started: 12:45 PM EDT 3/24/25    CHIEF COMPLAINT       Chief Complaint   Patient presents with    Shortness of Breath     Started Wednesday. Increased congestion, productive cough with green sputum. Denies CP       HISTORY OF PRESENT ILLNESS: 1 or more Elements   History From: patient and EMS    Limitations to history : None    Deyvi Rose is a 89 y.o. male who presents with shortness of breath and cough.  Patient reports his symptoms started 5 days ago particular worsened today.  He is had a cough is productive of green sputum.  He is not on oxygen at baseline at home but was requiring 4 L by EMS with audible wheezing.  He received a breathing treatment with improvement per EMS.  He has history of A-fib with a Watchman device in place.  He is not any blood thinners.    Patient denies fever, chills, headache, chest pain, abdominal pain, nausea, vomiting, diarrhea, lightheadedness, dysuria, hematuria, hematochezia, and melena.    Nursing Notes were all reviewed and agreed with or any disagreements were addressed in the HPI.        REVIEW OF SYSTEMS :           Positives and Pertinent negatives as per HPI.     SURGICAL HISTORY     Past Surgical History:   Procedure Laterality Date    CARDIOVERSION  03/28/2023    Successful   Dr Laughlin    CARDIOVERSION  04/24/2023    Successful  Dr. Laughlin    CARDIOVERSION  05/09/2023    Successful  Dr Laughlin    CARPAL TUNNEL RELEASE Right 2017    CARPAL TUNNEL RELEASE Left 2017    COLECTOMY      for polyps    COLONOSCOPY      ELBOW SURGERY      tendon reattachment    EP DEVICE PROCEDURE N/A 5/20/2024    Watchman nael closure device performed by Dequan Bates [place] [facility]    EYE SURGERY Bilateral     cataracts    FOOT

## 2025-03-24 NOTE — ED PROVIDER NOTES
ATTENDING ATTESTATION:     Deyvi Rose presented to the emergency department for evaluation of Shortness of Breath (Started Wednesday. Increased congestion, productive cough with green sputum. Denies CP)   and was initially evaluated by the Medical Resident. See Original ED Note for H&P and ED course above.     I have reviewed and discussed the case, including pertinent history (medical, surgical, family and social) and exam findings with the Medical Resident assigned to Deyvi MAI Milton.  I have personally performed and/or participated in the history, exam, medical decision making, and procedures and agree with all pertinent clinical information and any additional changes or corrections are noted below in my assessment and plan. I have discussed this patient in detail with the resident, and provided the instruction and education,       I have reviewed my findings and recommendations with the assigned Medical Resident, Deyvi Rose and members of family present at the time of disposition.      I have performed a history and physical examination of this patient and directly supervised the resident caring for this patient      [facility] EMERGENCY DEPARTMENT  EMERGENCY DEPARTMENT ENCOUNTER        Pt Name: Deyvi Rose  MRN: [id number]  Birthdate 4/14/1934  Date of evaluation: 3/24/2025  Provider: Diya Husain DO  PCP: Heather Ascencio MD  Note Started: 12:57 PM EDT 3/24/25    CHIEF COMPLAINT       Chief Complaint   Patient presents with    Shortness of Breath     Started Wednesday. Increased congestion, productive cough with green sputum. Denies CP       HISTORY OF PRESENT ILLNESS: 1 or more Elements   History From: Patient    Limitations to history : None    Deyvi Rose is a 89 y.o. male with a past medical history of atrial fibrillation status post watchman, idiopathic peripheral neuropathy, BPH, who presents to the ED with chief complaint of cough.  Onset is 5 days ago.  [facility]  Home

## 2025-03-24 NOTE — H&P
air.  Gastrointestinal: Soft, non-tender, non-distended. Active bowel sounds  Genitourinary: No CVA tenderness  Extremities: No clubbing or cyanosis. No edema  Neurological: Awake, alert, oriented x 3.  No evidence of focal motor or sensory deficits  Psychological: Appropriate affect.       BP (!) 105/93   Pulse 94   Temp 98.5 °F (36.9 °C) (Temporal)   Resp 18   Wt 94.8 kg (209 lb)   SpO2 99%   BMI 32.73 kg/m²     Recent Labs     03/24/25  1549      K 3.9      CO2 29   BUN 24*   CREATININE 0.6*   GLUCOSE 161*   CALCIUM 9.2       Recent Labs     03/24/25  1549   WBC 14.3*   RBC 5.73   HGB 16.4   HCT 52.6   MCV 91.8   MCH 28.6   MCHC 31.2*   RDW 16.3*   MPV Can not be calculated     Radiology:   XR CHEST 1 VIEW  Result Date: 3/24/2025  No acute process.       EKG preliminary: Narrative & Impression    Atrial fibrillation  Septal infarct , age undetermined  Abnormal ECG  When compared with ECG of 10-APR-2024 14:19,  No significant change was found       Assessment & Plan:    Acute respiratory failure with hypoxia secondary to RSV infection  -reportedly hypoxic for EMS and placed on O2 4L NC  -he has been weaned to room air  -received solumedrol 60mg in ER  -solumedrol 40mg BID starting tomorrow am  -duonebs q4h while awake & prn  -guaifenesin BID  -incentive spirometry & PEP flutter  -NP prescribed 7-day course of augmentin on 3/22, will  continue as he only has a few doays remaining  -flu/covid negative  -CXR negative for acute process  -BC's & full resp panel ordered    Thrombocytopenia  -initial platelets 78  -monitor platelets  -Lovenox for DVT prophylaxis for platelets >50    A-fib  -currently CVR  -continue home metoprolol  -pt has Watchman device    DAT and central sleep apnea  -wife has home cpap at bedside    Physical deconditioning  -pt has been receiving home PT  -PT/OT consulted    Code Status: Full Code  DVT prophylaxis: Lovenox 40mg daily    Disposition: From home. Newly admitted, but

## 2025-03-25 PROBLEM — I50.9 CONGESTIVE HEART FAILURE (HCC): Status: ACTIVE | Noted: 2025-03-25

## 2025-03-25 PROBLEM — J12.1 PNEUMONIA DUE TO RESPIRATORY SYNCYTIAL VIRUS (RSV): Status: ACTIVE | Noted: 2025-03-25

## 2025-03-25 PROBLEM — I50.32 CHRONIC HEART FAILURE WITH PRESERVED EJECTION FRACTION (HFPEF) (HCC): Status: ACTIVE | Noted: 2025-03-25

## 2025-03-25 LAB
ANION GAP SERPL CALCULATED.3IONS-SCNC: 11 MMOL/L (ref 7–16)
B PARAP IS1001 DNA NPH QL NAA+NON-PROBE: NOT DETECTED
B PERT DNA SPEC QL NAA+PROBE: NOT DETECTED
BASOPHILS # BLD: 0.02 K/UL (ref 0–0.2)
BASOPHILS NFR BLD: 0 % (ref 0–2)
BUN SERPL-MCNC: 21 MG/DL (ref 6–23)
C PNEUM DNA NPH QL NAA+NON-PROBE: NOT DETECTED
CALCIUM SERPL-MCNC: 9.1 MG/DL (ref 8.6–10.2)
CHLORIDE SERPL-SCNC: 99 MMOL/L (ref 98–107)
CO2 SERPL-SCNC: 28 MMOL/L (ref 22–29)
CREAT SERPL-MCNC: 0.5 MG/DL (ref 0.7–1.2)
EOSINOPHIL # BLD: 0.01 K/UL (ref 0.05–0.5)
EOSINOPHILS RELATIVE PERCENT: 0 % (ref 0–6)
ERYTHROCYTE [DISTWIDTH] IN BLOOD BY AUTOMATED COUNT: 16 % (ref 11.5–15)
FLUAV RNA NPH QL NAA+NON-PROBE: NOT DETECTED
FLUBV RNA NPH QL NAA+NON-PROBE: NOT DETECTED
GFR, ESTIMATED: >90 ML/MIN/1.73M2
GLUCOSE SERPL-MCNC: 166 MG/DL (ref 74–99)
HADV DNA NPH QL NAA+NON-PROBE: NOT DETECTED
HCOV 229E RNA NPH QL NAA+NON-PROBE: NOT DETECTED
HCOV HKU1 RNA NPH QL NAA+NON-PROBE: NOT DETECTED
HCOV NL63 RNA NPH QL NAA+NON-PROBE: NOT DETECTED
HCOV OC43 RNA NPH QL NAA+NON-PROBE: NOT DETECTED
HCT VFR BLD AUTO: 47.6 % (ref 37–54)
HGB BLD-MCNC: 15.5 G/DL (ref 12.5–16.5)
HMPV RNA NPH QL NAA+NON-PROBE: NOT DETECTED
HPIV1 RNA NPH QL NAA+NON-PROBE: NOT DETECTED
HPIV2 RNA NPH QL NAA+NON-PROBE: NOT DETECTED
HPIV3 RNA NPH QL NAA+NON-PROBE: NOT DETECTED
HPIV4 RNA NPH QL NAA+NON-PROBE: NOT DETECTED
IMM GRANULOCYTES # BLD AUTO: 0.23 K/UL (ref 0–0.58)
IMM GRANULOCYTES NFR BLD: 2 % (ref 0–5)
LYMPHOCYTES NFR BLD: 0.54 K/UL (ref 1.5–4)
LYMPHOCYTES RELATIVE PERCENT: 4 % (ref 20–42)
M PNEUMO DNA NPH QL NAA+NON-PROBE: NOT DETECTED
MCH RBC QN AUTO: 29.2 PG (ref 26–35)
MCHC RBC AUTO-ENTMCNC: 32.6 G/DL (ref 32–34.5)
MCV RBC AUTO: 89.8 FL (ref 80–99.9)
MONOCYTES NFR BLD: 0.21 K/UL (ref 0.1–0.95)
MONOCYTES NFR BLD: 2 % (ref 2–12)
NEUTROPHILS NFR BLD: 92 % (ref 43–80)
NEUTS SEG NFR BLD: 11.25 K/UL (ref 1.8–7.3)
PLATELET CONFIRMATION: NORMAL
PLATELET, FLUORESCENCE: 85 K/UL (ref 130–450)
PMV BLD AUTO: ABNORMAL FL (ref 7–12)
POTASSIUM SERPL-SCNC: 4.2 MMOL/L (ref 3.5–5)
RBC # BLD AUTO: 5.3 M/UL (ref 3.8–5.8)
RBC # BLD: NORMAL 10*6/UL
RSV RNA NPH QL NAA+NON-PROBE: DETECTED
RV+EV RNA NPH QL NAA+NON-PROBE: NOT DETECTED
SARS-COV-2 RNA NPH QL NAA+NON-PROBE: NOT DETECTED
SODIUM SERPL-SCNC: 138 MMOL/L (ref 132–146)
SPECIMEN DESCRIPTION: ABNORMAL
WBC OTHER # BLD: 12.3 K/UL (ref 4.5–11.5)

## 2025-03-25 PROCEDURE — 86738 MYCOPLASMA ANTIBODY: CPT

## 2025-03-25 PROCEDURE — 2500000003 HC RX 250 WO HCPCS: Performed by: NURSE PRACTITIONER

## 2025-03-25 PROCEDURE — 87899 AGENT NOS ASSAY W/OPTIC: CPT

## 2025-03-25 PROCEDURE — 87449 NOS EACH ORGANISM AG IA: CPT

## 2025-03-25 PROCEDURE — 6370000000 HC RX 637 (ALT 250 FOR IP): Performed by: NURSE PRACTITIONER

## 2025-03-25 PROCEDURE — 94640 AIRWAY INHALATION TREATMENT: CPT

## 2025-03-25 PROCEDURE — 6360000002 HC RX W HCPCS: Performed by: NURSE PRACTITIONER

## 2025-03-25 PROCEDURE — 2060000000 HC ICU INTERMEDIATE R&B

## 2025-03-25 PROCEDURE — 80048 BASIC METABOLIC PNL TOTAL CA: CPT

## 2025-03-25 PROCEDURE — APPSS30 APP SPLIT SHARED TIME 16-30 MINUTES: Performed by: NURSE PRACTITIONER

## 2025-03-25 PROCEDURE — 99233 SBSQ HOSP IP/OBS HIGH 50: CPT | Performed by: INTERNAL MEDICINE

## 2025-03-25 PROCEDURE — 85025 COMPLETE CBC W/AUTO DIFF WBC: CPT

## 2025-03-25 RX ORDER — FUROSEMIDE 20 MG/1
20 TABLET ORAL EVERY OTHER DAY
COMMUNITY

## 2025-03-25 RX ADMIN — PREGABALIN 100 MG: 100 CAPSULE ORAL at 20:49

## 2025-03-25 RX ADMIN — IPRATROPIUM BROMIDE AND ALBUTEROL SULFATE 1 DOSE: .5; 2.5 SOLUTION RESPIRATORY (INHALATION) at 16:43

## 2025-03-25 RX ADMIN — GUAIFENESIN 600 MG: 600 TABLET, EXTENDED RELEASE ORAL at 20:49

## 2025-03-25 RX ADMIN — ASPIRIN 81 MG: 81 TABLET, CHEWABLE ORAL at 08:31

## 2025-03-25 RX ADMIN — AMOXICILLIN AND CLAVULANATE POTASSIUM 1 TABLET: 875; 125 TABLET, FILM COATED ORAL at 20:49

## 2025-03-25 RX ADMIN — CYANOCOBALAMIN TAB 500 MCG 250 MCG: 500 TAB at 12:08

## 2025-03-25 RX ADMIN — METOPROLOL SUCCINATE 25 MG: 25 TABLET, EXTENDED RELEASE ORAL at 08:30

## 2025-03-25 RX ADMIN — Medication 1 TABLET: at 08:30

## 2025-03-25 RX ADMIN — IPRATROPIUM BROMIDE AND ALBUTEROL SULFATE 1 DOSE: .5; 2.5 SOLUTION RESPIRATORY (INHALATION) at 05:52

## 2025-03-25 RX ADMIN — ALLOPURINOL 100 MG: 100 TABLET ORAL at 08:30

## 2025-03-25 RX ADMIN — IPRATROPIUM BROMIDE AND ALBUTEROL SULFATE 1 DOSE: .5; 2.5 SOLUTION RESPIRATORY (INHALATION) at 09:18

## 2025-03-25 RX ADMIN — GUAIFENESIN 600 MG: 600 TABLET, EXTENDED RELEASE ORAL at 08:30

## 2025-03-25 RX ADMIN — CHOLECALCIFEROL TAB 125 MCG (5000 UNIT) 5000 UNITS: 125 TAB at 08:30

## 2025-03-25 RX ADMIN — AMOXICILLIN AND CLAVULANATE POTASSIUM 1 TABLET: 875; 125 TABLET, FILM COATED ORAL at 08:29

## 2025-03-25 RX ADMIN — FUROSEMIDE 20 MG: 20 TABLET ORAL at 08:29

## 2025-03-25 RX ADMIN — IPRATROPIUM BROMIDE AND ALBUTEROL SULFATE 1 DOSE: .5; 2.5 SOLUTION RESPIRATORY (INHALATION) at 12:31

## 2025-03-25 RX ADMIN — Medication 10 ML: at 21:17

## 2025-03-25 RX ADMIN — Medication 10 ML: at 12:08

## 2025-03-25 RX ADMIN — WATER 40 MG: 1 INJECTION INTRAMUSCULAR; INTRAVENOUS; SUBCUTANEOUS at 19:20

## 2025-03-25 RX ADMIN — CALCIUM 500 MG: 500 TABLET ORAL at 12:07

## 2025-03-25 RX ADMIN — WATER 40 MG: 1 INJECTION INTRAMUSCULAR; INTRAVENOUS; SUBCUTANEOUS at 06:38

## 2025-03-25 RX ADMIN — PREGABALIN 100 MG: 100 CAPSULE ORAL at 08:29

## 2025-03-25 RX ADMIN — TAMSULOSIN HYDROCHLORIDE 0.4 MG: 0.4 CAPSULE ORAL at 20:49

## 2025-03-25 RX ADMIN — ENOXAPARIN SODIUM 40 MG: 100 INJECTION SUBCUTANEOUS at 08:31

## 2025-03-26 VITALS
HEART RATE: 108 BPM | HEIGHT: 67 IN | DIASTOLIC BLOOD PRESSURE: 87 MMHG | WEIGHT: 210.8 LBS | SYSTOLIC BLOOD PRESSURE: 137 MMHG | BODY MASS INDEX: 33.09 KG/M2 | RESPIRATION RATE: 18 BRPM | TEMPERATURE: 98.1 F | OXYGEN SATURATION: 95 %

## 2025-03-26 PROBLEM — I48.20 ATRIAL FIBRILLATION, CHRONIC (HCC): Status: ACTIVE | Noted: 2025-03-26

## 2025-03-26 PROBLEM — D69.6 THROMBOCYTOPENIA: Status: ACTIVE | Noted: 2025-03-26

## 2025-03-26 PROBLEM — B33.8 RSV (RESPIRATORY SYNCYTIAL VIRUS INFECTION): Status: ACTIVE | Noted: 2025-03-25

## 2025-03-26 PROBLEM — J15.7 MYCOPLASMAL PNEUMONIA: Status: ACTIVE | Noted: 2025-03-26

## 2025-03-26 LAB
L PNEUMO1 AG UR QL IA.RAPID: NEGATIVE
MYCOPLASMA AB,IGM: PRESENT
S PNEUM AG SPEC QL: NEGATIVE
SPECIMEN SOURCE: NORMAL

## 2025-03-26 PROCEDURE — 97161 PT EVAL LOW COMPLEX 20 MIN: CPT | Performed by: PHYSICAL THERAPIST

## 2025-03-26 PROCEDURE — 6370000000 HC RX 637 (ALT 250 FOR IP): Performed by: INTERNAL MEDICINE

## 2025-03-26 PROCEDURE — 6360000002 HC RX W HCPCS: Performed by: NURSE PRACTITIONER

## 2025-03-26 PROCEDURE — 6370000000 HC RX 637 (ALT 250 FOR IP): Performed by: NURSE PRACTITIONER

## 2025-03-26 PROCEDURE — 99239 HOSP IP/OBS DSCHRG MGMT >30: CPT | Performed by: NURSE PRACTITIONER

## 2025-03-26 PROCEDURE — 97165 OT EVAL LOW COMPLEX 30 MIN: CPT

## 2025-03-26 PROCEDURE — 97530 THERAPEUTIC ACTIVITIES: CPT | Performed by: PHYSICAL THERAPIST

## 2025-03-26 PROCEDURE — 2500000003 HC RX 250 WO HCPCS: Performed by: NURSE PRACTITIONER

## 2025-03-26 PROCEDURE — 97530 THERAPEUTIC ACTIVITIES: CPT

## 2025-03-26 PROCEDURE — 97110 THERAPEUTIC EXERCISES: CPT | Performed by: PHYSICAL THERAPIST

## 2025-03-26 PROCEDURE — 94640 AIRWAY INHALATION TREATMENT: CPT

## 2025-03-26 RX ORDER — IPRATROPIUM BROMIDE AND ALBUTEROL SULFATE 2.5; .5 MG/3ML; MG/3ML
3 SOLUTION RESPIRATORY (INHALATION) EVERY 4 HOURS PRN
Qty: 360 ML | Refills: 0 | Status: SHIPPED | OUTPATIENT
Start: 2025-03-26 | End: 2025-03-26 | Stop reason: HOSPADM

## 2025-03-26 RX ORDER — LEVOFLOXACIN 750 MG/1
750 TABLET, FILM COATED ORAL EVERY 24 HOURS
Qty: 6 TABLET | Refills: 0 | Status: SHIPPED | OUTPATIENT
Start: 2025-03-27 | End: 2025-04-02

## 2025-03-26 RX ORDER — GUAIFENESIN 600 MG/1
600 TABLET, EXTENDED RELEASE ORAL 2 TIMES DAILY
Qty: 10 TABLET | Refills: 0 | Status: SHIPPED | OUTPATIENT
Start: 2025-03-26 | End: 2025-03-31

## 2025-03-26 RX ORDER — LEVOFLOXACIN 750 MG/1
750 TABLET, FILM COATED ORAL EVERY 24 HOURS
Status: DISCONTINUED | OUTPATIENT
Start: 2025-03-26 | End: 2025-03-27 | Stop reason: HOSPADM

## 2025-03-26 RX ORDER — PREDNISONE 20 MG/1
40 TABLET ORAL DAILY
Qty: 10 TABLET | Refills: 0 | Status: SHIPPED | OUTPATIENT
Start: 2025-03-27 | End: 2025-04-01

## 2025-03-26 RX ORDER — PREDNISONE 20 MG/1
40 TABLET ORAL DAILY
Status: DISCONTINUED | OUTPATIENT
Start: 2025-03-27 | End: 2025-03-27 | Stop reason: HOSPADM

## 2025-03-26 RX ADMIN — PREGABALIN 100 MG: 100 CAPSULE ORAL at 20:20

## 2025-03-26 RX ADMIN — ALLOPURINOL 100 MG: 100 TABLET ORAL at 08:46

## 2025-03-26 RX ADMIN — PREGABALIN 100 MG: 100 CAPSULE ORAL at 08:26

## 2025-03-26 RX ADMIN — Medication 10 ML: at 20:20

## 2025-03-26 RX ADMIN — AMOXICILLIN AND CLAVULANATE POTASSIUM 1 TABLET: 875; 125 TABLET, FILM COATED ORAL at 08:25

## 2025-03-26 RX ADMIN — IPRATROPIUM BROMIDE AND ALBUTEROL SULFATE 1 DOSE: .5; 2.5 SOLUTION RESPIRATORY (INHALATION) at 09:21

## 2025-03-26 RX ADMIN — IPRATROPIUM BROMIDE AND ALBUTEROL SULFATE 1 DOSE: .5; 2.5 SOLUTION RESPIRATORY (INHALATION) at 06:13

## 2025-03-26 RX ADMIN — TAMSULOSIN HYDROCHLORIDE 0.4 MG: 0.4 CAPSULE ORAL at 20:20

## 2025-03-26 RX ADMIN — WATER 40 MG: 1 INJECTION INTRAMUSCULAR; INTRAVENOUS; SUBCUTANEOUS at 05:53

## 2025-03-26 RX ADMIN — Medication 1 TABLET: at 08:26

## 2025-03-26 RX ADMIN — CALCIUM 500 MG: 500 TABLET ORAL at 08:26

## 2025-03-26 RX ADMIN — FUROSEMIDE 40 MG: 20 TABLET ORAL at 08:25

## 2025-03-26 RX ADMIN — CHOLECALCIFEROL TAB 125 MCG (5000 UNIT) 5000 UNITS: 125 TAB at 08:26

## 2025-03-26 RX ADMIN — IPRATROPIUM BROMIDE AND ALBUTEROL SULFATE 1 DOSE: .5; 2.5 SOLUTION RESPIRATORY (INHALATION) at 15:26

## 2025-03-26 RX ADMIN — METOPROLOL SUCCINATE 25 MG: 25 TABLET, EXTENDED RELEASE ORAL at 08:26

## 2025-03-26 RX ADMIN — GUAIFENESIN 600 MG: 600 TABLET, EXTENDED RELEASE ORAL at 20:20

## 2025-03-26 RX ADMIN — CYANOCOBALAMIN TAB 500 MCG 250 MCG: 500 TAB at 11:36

## 2025-03-26 RX ADMIN — GUAIFENESIN 600 MG: 600 TABLET, EXTENDED RELEASE ORAL at 08:26

## 2025-03-26 RX ADMIN — ENOXAPARIN SODIUM 40 MG: 100 INJECTION SUBCUTANEOUS at 08:29

## 2025-03-26 RX ADMIN — IPRATROPIUM BROMIDE AND ALBUTEROL SULFATE 1 DOSE: .5; 2.5 SOLUTION RESPIRATORY (INHALATION) at 22:39

## 2025-03-26 RX ADMIN — ASPIRIN 81 MG: 81 TABLET, CHEWABLE ORAL at 08:25

## 2025-03-26 RX ADMIN — LEVOFLOXACIN 750 MG: 750 TABLET, FILM COATED ORAL at 11:36

## 2025-03-26 RX ADMIN — Medication 10 ML: at 08:29

## 2025-03-26 NOTE — PLAN OF CARE
Problem: Safety - Adult  Goal: Free from fall injury  3/26/2025 1757 by Ronan Schultz RN  Outcome: Progressing  Flowsheets (Taken 3/26/2025 0855)  Free From Fall Injury: Instruct family/caregiver on patient safety  3/26/2025 0634 by Malik Mathis RN  Outcome: Progressing  Flowsheets (Taken 3/25/2025 2000)  Free From Fall Injury:   Instruct family/caregiver on patient safety   Based on caregiver fall risk screen, instruct family/caregiver to ask for assistance with transferring infant if caregiver noted to have fall risk factors     Problem: Chronic Conditions and Co-morbidities  Goal: Patient's chronic conditions and co-morbidity symptoms are monitored and maintained or improved  3/26/2025 1757 by Ronan Schultz RN  Outcome: Progressing  Flowsheets (Taken 3/26/2025 0855)  Care Plan - Patient's Chronic Conditions and Co-Morbidity Symptoms are Monitored and Maintained or Improved: Monitor and assess patient's chronic conditions and comorbid symptoms for stability, deterioration, or improvement  3/26/2025 0634 by Malik Mathis RN  Outcome: Progressing  Flowsheets (Taken 3/25/2025 2000)  Care Plan - Patient's Chronic Conditions and Co-Morbidity Symptoms are Monitored and Maintained or Improved:   Monitor and assess patient's chronic conditions and comorbid symptoms for stability, deterioration, or improvement   Collaborate with multidisciplinary team to address chronic and comorbid conditions and prevent exacerbation or deterioration   Update acute care plan with appropriate goals if chronic or comorbid symptoms are exacerbated and prevent overall improvement and discharge     Problem: Discharge Planning  Goal: Discharge to home or other facility with appropriate resources  3/26/2025 1757 by Ronan Schultz RN  Outcome: Progressing  Flowsheets (Taken 3/26/2025 0855)  Discharge to home or other facility with appropriate resources: Identify barriers to discharge with patient and

## 2025-03-26 NOTE — ACP (ADVANCE CARE PLANNING)
Advance Care Planning   Healthcare Decision Maker:    Primary Decision Maker: Kenyetta Rose - Spouse - [phone]    Secondary Decision Maker: Myra Langley - Child - [phone]    Supplemental (Other) Decision Maker: Willa Gutierrez - Child - [phone]    Supplemental (Other) Decision Maker: Deyvi Mcgowan - Child - [phone]

## 2025-03-26 NOTE — DISCHARGE SUMMARY
[facility]   [facility]    Discharge date and time:  3/26/2025  4:26 PM    Follow-up with:     Heather Ascencio MD  [address] [place] [address] [address] [place] [address]  [phone]    Schedule an appointment as soon as possible for a visit         Activity level: As tolerated    Diet: ADULT DIET; Regular    Labs:Per PCP    Condition at discharge: Stable    Dispo: Discharge to home    Patient ID:  Deyvi Rose 4/14/1934  [id number]    Consults:  IP CONSULT TO SOCIAL WORK    Procedures: N/A    Hospital Course: Deyvi Rose is a 89 y.o. male who presented to ER for shortness of breath and was found to have RSV infection and mycoplasma pneumoniae. He was initially hypoxic and placed on O2 4l NC. He has since been weaned to room air. He was started on solumedrol, which has been switched to a prednisone burst. His outpatient prescription was originally continued inpatient, but when mycoplasma came back positive the augmentin was stopped and he was started on levofloxacin.  Pt reports that he is feeling much better and would like to go home. He has remained hemodynamically stable. At this time, he is without objective evidence of an acute process requiring continuing hospitalization or inpatient management.  Pt is stable for discharge with outpatient follow-up. Any medication changes as listed below.    Discharge Exam:  Vitals:    03/26/25 0557 03/26/25 0732 03/26/25 1052 03/26/25 1613   BP: 134/72 (!) 92/59 134/84 (!) 142/82   Pulse: 99 99 (!) 108 (!) 106   Resp: 18 18 16 16   Temp: 98.2 °F (36.8 °C)  98.3 °F (36.8 °C) 97.3 °F (36.3 °C)   TempSrc: Oral  Oral Oral   SpO2: 98% 94% 95%    Weight:       Height:         General appearance: No apparent distress, appears stated age and cooperative.  HEENT:  Conjunctivae/corneas clear.   Neck: Supple. No jugular venous distention.   Respiratory: Clear to auscultation bilaterally, normal respiratory effort. Currently on room air with SPO2

## 2025-03-26 NOTE — CARE COORDINATION
Case Management Assessment  Initial Evaluation    Date/Time of Evaluation: 3/26/2025 4:16 PM  Assessment Completed by: Wendy Pedersen RN    If patient is discharged prior to next notation, then this note serves as note for discharge by case management.    Patient Name: Deyvi Rose                   YOB: 1934  Diagnosis: Acute respiratory failure with hypoxia [J96.01]  Pneumonia due to respiratory syncytial virus (RSV) [J12.1]                   Date / Time: 3/24/2025 12:34 PM    Patient Admission Status: Inpatient   Readmission Risk (Low < 19, Mod (19-27), High > 27): Readmission Risk Score: 15.8    Current PCP: Heather Ascencio MD  PCP verified by CM? Yes (Heather Ascencio)    Chart Reviewed: Yes      History Provided by: Patient  Patient Orientation: Alert and Oriented    Patient Cognition: Alert    Hospitalization in the last 30 days (Readmission):  No    If yes, Readmission Assessment in CM Navigator will be completed.    Advance Directives:      Code Status: Full Code   Patient's Primary Decision Maker is: Legal Next of Kin    Primary Decision Maker: Kenyetta Rose - Spouse - [phone]    Secondary Decision Maker: Willa Gutierrez - Child - [phone]    Supplemental (Other) Decision Maker: Deyvi Mcgowan - Child - [phone]    Discharge Planning:    Patient lives with: Spouse/Significant Other Type of Home: House  Primary Care Giver: Family (wife vs private caregivers)  Patient Support Systems include: Children, Spouse/Significant Other   Current Financial resources:    Current community resources:    Current services prior to admission: C-pap, Home Care            Current DME:              Type of Home Care services:  Aide Services, PT, OT    ADLS  Prior functional level: Assistance with the following:, Bathing, Dressing, Toileting, Cooking, Housework, Shopping, Mobility  Current functional level: Assistance with the following:, Bathing, Dressing, Toileting, Cooking, Housework, Shopping,

## 2025-03-26 NOTE — PLAN OF CARE
Problem: Safety - Adult  Goal: Free from fall injury  3/26/2025 0634 by Malik Mathis RN  Outcome: Progressing  Flowsheets (Taken 3/25/2025 2000)  Free From Fall Injury:   Instruct family/caregiver on patient safety   Based on caregiver fall risk screen, instruct family/caregiver to ask for assistance with transferring infant if caregiver noted to have fall risk factors  3/25/2025 1759 by Malissa Kingston RN  Outcome: Progressing     Problem: Chronic Conditions and Co-morbidities  Goal: Patient's chronic conditions and co-morbidity symptoms are monitored and maintained or improved  3/26/2025 0634 by Malik Mathis RN  Outcome: Progressing  Flowsheets (Taken 3/25/2025 2000)  Care Plan - Patient's Chronic Conditions and Co-Morbidity Symptoms are Monitored and Maintained or Improved:   Monitor and assess patient's chronic conditions and comorbid symptoms for stability, deterioration, or improvement   Collaborate with multidisciplinary team to address chronic and comorbid conditions and prevent exacerbation or deterioration   Update acute care plan with appropriate goals if chronic or comorbid symptoms are exacerbated and prevent overall improvement and discharge  3/25/2025 1759 by Malissa Kingston RN  Outcome: Progressing  Flowsheets (Taken 3/25/2025 1719)  Care Plan - Patient's Chronic Conditions and Co-Morbidity Symptoms are Monitored and Maintained or Improved: Monitor and assess patient's chronic conditions and comorbid symptoms for stability, deterioration, or improvement     Problem: Discharge Planning  Goal: Discharge to home or other facility with appropriate resources  3/26/2025 0634 by Malik Mathis RN  Outcome: Progressing  Flowsheets (Taken 3/25/2025 2000)  Discharge to home or other facility with appropriate resources:   Identify barriers to discharge with patient and caregiver   Arrange for needed discharge resources and transportation as appropriate   Identify discharge

## 2025-03-27 ENCOUNTER — TELEPHONE (OUTPATIENT)
Dept: PRIMARY CARE CLINIC | Age: 89
End: 2025-03-27

## 2025-03-27 ENCOUNTER — CARE COORDINATION (OUTPATIENT)
Dept: CARE COORDINATION | Age: 89
End: 2025-03-27

## 2025-03-27 DIAGNOSIS — B33.8 RSV (RESPIRATORY SYNCYTIAL VIRUS INFECTION): Primary | ICD-10-CM

## 2025-03-27 DIAGNOSIS — J96.01 ACUTE RESPIRATORY FAILURE WITH HYPOXIA: ICD-10-CM

## 2025-03-27 PROCEDURE — 1111F DSCHRG MED/CURRENT MED MERGE: CPT | Performed by: INTERNAL MEDICINE

## 2025-03-27 NOTE — PLAN OF CARE
Problem: Safety - Adult  Goal: Free from fall injury  3/26/2025 2038 by Juliet Gonzales RN  Outcome: Progressing  3/26/2025 1757 by Ronan Schultz RN  Outcome: Progressing  Flowsheets (Taken 3/26/2025 0855)  Free From Fall Injury: Instruct family/caregiver on patient safety     Problem: Chronic Conditions and Co-morbidities  Goal: Patient's chronic conditions and co-morbidity symptoms are monitored and maintained or improved  3/26/2025 2038 by Juliet Gonzales RN  Outcome: Progressing  3/26/2025 1757 by Ronan Schultz RN  Outcome: Progressing  Flowsheets (Taken 3/26/2025 0855)  Care Plan - Patient's Chronic Conditions and Co-Morbidity Symptoms are Monitored and Maintained or Improved: Monitor and assess patient's chronic conditions and comorbid symptoms for stability, deterioration, or improvement     Problem: Discharge Planning  Goal: Discharge to home or other facility with appropriate resources  3/26/2025 2038 by Juliet Gonzales RN  Outcome: Progressing  3/26/2025 1757 by Ronan Schultz RN  Outcome: Progressing  Flowsheets (Taken 3/26/2025 0855)  Discharge to home or other facility with appropriate resources: Identify barriers to discharge with patient and caregiver     Problem: Skin/Tissue Integrity  Goal: Skin integrity remains intact  Description: 1.  Monitor for areas of redness and/or skin breakdown  2.  Assess vascular access sites hourly  3.  Every 4-6 hours minimum:  Change oxygen saturation probe site  4.  Every 4-6 hours:  If on nasal continuous positive airway pressure, respiratory therapy assess nares and determine need for appliance change or resting period  3/26/2025 2038 by Juliet Gonzales RN  Outcome: Progressing  3/26/2025 1757 by Ronan Schultz RN  Outcome: Progressing  Flowsheets (Taken 3/26/2025 0855)  Skin Integrity Remains Intact: Monitor for areas of redness and/or skin breakdown     Problem: ABCDS Injury Assessment  Goal: Absence of physical injury  3/26/2025 2038 by Janet

## 2025-03-27 NOTE — TELEPHONE ENCOUNTER
Care Transitions Initial Follow Up Call    Outreach made within 2 business days of discharge: Yes    Patient: Deyvi Rose Patient : 1934   MRN: [id number]  Reason for Admission: rsv/pneumonia   Discharge Date: 3/26/25       Spoke with: wife    Discharge department/facility: [facility] Interactive Patient Contact:  Was patient able to fill all prescriptions: Yes  Was patient instructed to bring all medications to the follow-up visit: Yes  Is patient taking all medications as directed in the discharge summary? Yes  Does patient understand their discharge instructions: Yes  Does patient have questions or concerns that need addressed prior to 7-14 day follow up office visit: no    Additional needs identified to be addressed with provider  No needs identified             Scheduled appointment with PCP within 7-14 days    Follow Up  Future Appointments   Date Time Provider Department Center   2025  2:45 PM Heather Ascencio MD [facility] DEP       Diane Baker MA

## 2025-03-27 NOTE — PROGRESS NOTES
[facility] Hospitalist   Progress Note    Admitting Date and Time: 3/24/2025 12:34 PM  Admit Dx: Acute respiratory failure with hypoxia [J96.01]  Pneumonia due to respiratory syncytial virus (RSV) [J12.1]      Subjective:    3/25: Pt awake, A&O, lying in bed in ER in no apparent distress. States he didn't sleep well in the ER. Denies CP, SOB. Cough remains non-productive.  No needs at this time.     3/26: Pt , awake, A&O, lying in bed in no apparent distress. Pt states that he feels good, denies SOB, CP. He remains on room air. Awaiting PT/OT evals. If pt does not qualify for MA[facility]SA, he can likely be discharged later today or tomorrow.     ROS: Denies fever, chills, cp, sob, n/v, HA unless stated above.     Objective:    /72   Pulse 99   Temp 98.2 °F (36.8 °C) (Oral)   Resp 18   Ht 1.702 m (5' 7\")   Wt 95.6 kg (210 lb 12.8 oz)   SpO2 98%   BMI 33.02 kg/m²     PHYSICAL EXAM  General appearance: No apparent distress, appears stated age and cooperative.  HEENT:  Conjunctivae/corneas clear.   Neck: Supple. No jugular venous distention.   Respiratory: Clear to auscultation bilaterally, normal respiratory effort. Currently on room air with SPO2 93%  Cardiovascular: Regular rate rhythm, normal S1-S2  Abdomen: Soft, nontender, non-distended. Active bowel sounds  Musculoskeletal: No clubbing, cyanosis, no bilateral lower extremity edema. Brisk capillary refill.   Skin: No rashes on visible skin  Neurologic: awake, alert and following commands     Assessment & Plan:    Acute respiratory failure with hypoxia secondary to RSV infection  -reportedly hypoxic for EMS and placed on O2 4L NC  -he has been weaned to room air  -switch solumedrol to prednisone burst tomorrow  -duonebs q4h while awake & prn  -guaifenesin BID  -incentive spirometry & PEP flutter  -continue remaining course of outpatient augmentin   -flu/covid negative  -mycoplasma & urine antigens pending  -CXR negative for acute process  -BC's 
4 Eyes Skin Assessment     NAME:  Deyvi Rose  YOB: 1934  MEDICAL RECORD NUMBER:  [id number]    The patient is being assessed for  Admission    I agree that at least one RN has performed a thorough Head to Toe Skin Assessment on the patient. ALL assessment sites listed below have been assessed.      Areas assessed by both nurses:    Head, Face, Ears, Shoulders, Back, Chest, Arms, Elbows, Hands, Sacrum. Buttock, Coccyx, Ischium, Legs. Feet and Heels, and Under Medical Devices         Does the Patient have a Wound? No noted wound(s)     Blanchable redness coccyx/sacrum  Blanchable redness BLE heels, Skin tear to ( L ) forearm, ecchymosis BUE, moist redness groin area  Melchor Prevention initiated by RN: Yes  Wound Care Orders initiated by RN: No    Pressure Injury (Stage 3,4, Unstageable, DTI, NWPT, and Complex wounds) if present, place Wound referral order by RN under : No    New Ostomies, if present place, Ostomy referral order under : No     Nurse 1 eSignature: Electronically signed by Malissa Kingston RN on 3/25/25 at 6:46 PM EDT    **SHARE this note so that the co-signing nurse can place an eSignature**    Nurse 2 eSignature: Electronically signed by Jose Dean RN on 3/25/25 at 6:56 PM EDT   
OCCUPATIONAL THERAPY INITIAL EVALUATION    [facility]  [address] [place]        Date:3/26/2025                                                  Patient Name: Deyvi Rose    MRN: [id number]    : 1934    Room: [address] [place] [address]      Evaluating OT: Laura Yuen OTR/L [id number]     Referring Provider and Specific Provider Orders/Date:      25  OT eval and treat  Start:  25,   End:  25,   ONE TIME,   Standing Count:  1 Occurrences,   R         Batista, Delia CHANEL, APRN - NP      Placement Recommendation: Subacute rehab. Pt reports not going to rehab and will get Home with Home Health Occupational therapy       Diagnosis:   1. Pneumonia due to respiratory syncytial virus (RSV)         Surgery: none      Pertinent Medical History:       Past Medical History:   Diagnosis Date    Arthritis     Atrial fibrillation, chronic (HCC)     GERD (gastroesophageal reflux disease)     HTN (hypertension)          Past Surgical History:   Procedure Laterality Date    CARDIOVERSION  2023    Successful   Dr Laughlin    CARDIOVERSION  2023    Successful  Dr. Laughlin    CARDIOVERSION  2023    Successful  Dr Laughlin    CARPAL TUNNEL RELEASE Right 2017    CARPAL TUNNEL RELEASE Left 2017    COLECTOMY      for polyps    COLONOSCOPY      ELBOW SURGERY      tendon reattachment    EP DEVICE PROCEDURE N/A 2024    Watchman nael closure device performed by Dequan Bates MD at [facility] CARDI[facility] CATH [facility]    EYE SURGERY Bilateral     cataracts    FOOT SURGERY Right 2018    fusion 1st mpj right foot    AL OSTEOT W/WO LNGTH SHRT/CORRJ 1ST METAR Right 2018    FUSION 1ST MPJ RIGHT FOOT performed by Dago Garcia Jr. DPM at [facility]        Precautions:  Fall Risk, Contact/droplet isolation/RSV, up with assistance     Assessment of current deficits:     [x] Functional mobility  [x]ADLs  [x] Strength               
[facility] arrived to transport patient to home around 2245. IV removed as well as external cath. Patient placed in his clothes and belongings sent with. Patient had his dentures in a cup, cell phone, and watch on wrist when leaving. Patient stable at this time and okay for transport.  
Physical Therapy  Physical Therapy Initial Evaluation/Plan of Care    Room #:  0516/0516-01  Patient Name: Deyvi Rose  YOB: 1934  MRN: [id number]    Date of Service: 3/26/2025     Tentative placement recommendation: Home with Home Health Physical Therapy  Equipment recommendation: To be determined      Evaluating Physical Therapist: Andrew Godoy, PT, DPT #[id number]      Specific Provider Orders/Date/Referring Provider :     03/24/25 2030    PT evaluation and treat  Start:  03/24/25 2030,   End:  03/24/25 2030,   ONE TIME,   Standing Count:  1 Occurrences,   R       Delia Batista, APRN - NP Acknowledge New    Admitting Diagnosis:   Acute respiratory failure with hypoxia [J96.01]  Pneumonia due to respiratory syncytial virus (RSV) [J12.1]      Surgery: none      Patient Active Problem List   Diagnosis    Gouty arthritis of toe of right foot    Hallux limitus, right    Sleep disorder, unspecified    DAT (obstructive sleep apnea)    Generalized osteoarthritis    Atrial fibrillation and flutter (HCC)    Mixed hyperlipidemia    Persistent atrial fibrillation (HCC)    Opioid dependence with current use (HCC)    Secondary hypercoagulable state    Gait disorder    Syncope    Paroxysmal atrial fibrillation (HCC)    BPH with obstruction/lower urinary tract symptoms    Glucose intolerance (impaired glucose tolerance)    Acute on chronic combined systolic and diastolic CHF (congestive heart failure) (HCC)    Impingement of left shoulder    Neuropathy    Cellulitis    Presence of Watchman left atrial appendage closure device    Sacral decubitus ulcer, stage II (HCC)    Idiopathic chronic gout without tophus    Pressure injury of buttock, stage 2 (HCC)    Idiopathic peripheral neuropathy    Acute respiratory failure with hypoxia (HCC)    Pneumonia due to respiratory syncytial virus (RSV)    Chronic heart failure with preserved ejection fraction (HFpEF) (HCC)    Mycoplasmal pneumonia        ASSESSMENT of Current 
Spiritual Health History and Assessment/Progress Note  [facility]    Spiritual/Emotional Needs,  ,  ,      Name: Deyvi Rose MRN: [id number]    Age: 89 y.o.     Sex: male   Language: English   Taoism: Caodaism   Acute respiratory failure with hypoxia     Date: 3/26/2025                           Spiritual Assessment began in [facility] PIC/ICU        Referral/Consult From: Rounding   Encounter Overview/Reason: Spiritual/Emotional Needs  Service Provided For: Patient    Sommer, Belief, Meaning:   Patient identifies as spiritual, is connected with a sommer tradition or spiritual practice, and has beliefs or practices that help with coping during difficult times  Family/Friends No family/friends present      Importance and Influence:  Patient has spiritual/personal beliefs that influence decisions regarding their health  Family/Friends No family/friends present    Community:  Patient feels well-supported. Support system includes: Spouse/Partner and Children  Family/Friends No family/friends present    Assessment and Plan of Care:     Patient Interventions include: Facilitated expression of thoughts and feelings, Explored spiritual coping/struggle/distress, and Affirmed coping skills/support systems  Family/Friends Interventions include: No family/friends present    Patient Plan of Care: Spiritual Care available upon further referral  Family/Friends Plan of Care: No family/friends present    Electronically signed by GALE Frank on 3/26/2025 at 10:19 AM   
daily    Disposition: From home. Currently anticipate additional 1-2 days for treatment & monitoring to ensure safe discharge.    Medications:  REVIEWED DAILY  Radiology: REVIEWED DAILY    Infusion Medications    sodium chloride       Scheduled Medications    allopurinol  100 mg Oral Daily    amoxicillin-clavulanate  1 tablet Oral BID    aspirin  81 mg Oral Daily    calcium elemental  500 mg Oral Daily    furosemide  20 mg Oral Every Other Day    [START ON 3/26/2025] furosemide  40 mg Oral Every Other Day    metoprolol succinate  25 mg Oral Daily    therapeutic multivitamin-minerals  1 tablet Oral Daily    pregabalin  100 mg Oral BID    tamsulosin  0.4 mg Oral Nightly    vitamin B-12  250 mcg Oral Daily    vitamin D3  5,000 Units Oral Daily    guaiFENesin  600 mg Oral BID    sodium chloride flush  5-40 mL IntraVENous 2 times per day    enoxaparin  40 mg SubCUTAneous Daily    ipratropium 0.5 mg-albuterol 2.5 mg  1 Dose Inhalation Q4H WA RT    methylPREDNISolone  40 mg IntraVENous Q12H     PRN Meds: sodium chloride flush, sodium chloride, ondansetron **OR** ondansetron, polyethylene glycol, acetaminophen **OR** acetaminophen, ipratropium 0.5 mg-albuterol 2.5 mg    Recent Labs     03/24/25  1549 03/25/25  0541    138   K 3.9 4.2    99   CO2 29 28   BUN 24* 21   CREATININE 0.6* 0.5*   GLUCOSE 161* 166*   CALCIUM 9.2 9.1     No results for input(s): \"MG\" in the last 72 hours.  No results for input(s): \"PHOS\" in the last 72 hours.  Recent Labs     03/24/25  1549 03/25/25  0541   WBC 14.3* 12.3*   RBC 5.73 5.30   HGB 16.4 15.5   HCT 52.6 47.6   MCV 91.8 89.8   MCH 28.6 29.2   MCHC 31.2* 32.6   RDW 16.3* 16.0*   MPV Can not be calculated Can not be calculated       PMH:  has a past medical history of Arthritis, Atrial fibrillation, chronic (HCC), GERD (gastroesophageal reflux disease), and HTN (hypertension).     30 minutes or more spent in chart review, examination, evaluation, counseling and review of

## 2025-03-27 NOTE — CARE COORDINATION
Care Transitions Note    Initial Call - Call within 2 business days of discharge: Yes    First attempt to reach the patient for initial Care Transition call post hospital discharge. HIPAA compliant message left with CTN's contact information requesting return phone call.      Patient Current Location:  Home: [address] [place] [id number]    Care Transition Nurse contacted the spouse/partner , (PHI form verified; on file) by telephone to perform post hospital discharge assessment, verified name and  as identifiers.  Provided introduction to self, and explanation of the Care Transition Nurse role.    Patient: Deyvi Rose    Patient : 1934   MRN: [id number]    Reason for Admission: Pneumonia due to RSV   Discharge Date: 3/26/25  RURS: Readmission Risk Score: 15.8      Last Discharge Facility       Date Complaint Diagnosis Description Type Department Provider    3/24/25 Shortness of Breath Pneumonia due to respiratory syncytial virus (RSV) ... ED to Hosp-Admission (Discharged) (ADMITTED) [facility] Ayden Naidu, DO; RADHA Husain..            Was this an external facility discharge? No    Additional needs identified to be addressed with provider   No needs identified             Method of communication with provider: none.    Patients top risk factors for readmission: functional physical ability, medical condition- , medication management, multiple health system providers, and polypharmacy    Interventions to address risk factors:   Review of patient management of conditions/medications:    Home Health: [facility]      Care Summary Note: Received a return call from Deyvi's wife Debbie for initial care transition call post hospital discharge. She reports that he is doing good so far today. She confirmed he has aides during the day and she cares for him in the evenings. She denies complaints of SOB at this time, reporting he had a breathing treatment prior to discharge

## 2025-03-31 DIAGNOSIS — Z95.818 PRESENCE OF WATCHMAN LEFT ATRIAL APPENDAGE CLOSURE DEVICE: ICD-10-CM

## 2025-03-31 DIAGNOSIS — I48.19 PERSISTENT ATRIAL FIBRILLATION (HCC): Primary | ICD-10-CM

## 2025-04-01 ENCOUNTER — OFFICE VISIT (OUTPATIENT)
Dept: PRIMARY CARE CLINIC | Age: 89
End: 2025-04-01

## 2025-04-01 VITALS
TEMPERATURE: 97 F | DIASTOLIC BLOOD PRESSURE: 70 MMHG | HEART RATE: 62 BPM | SYSTOLIC BLOOD PRESSURE: 102 MMHG | OXYGEN SATURATION: 97 % | HEIGHT: 67 IN | BODY MASS INDEX: 33.02 KG/M2

## 2025-04-01 DIAGNOSIS — J15.7 PNEUMONIA DUE TO MYCOPLASMA PNEUMONIAE, UNSPECIFIED LATERALITY, UNSPECIFIED PART OF LUNG: ICD-10-CM

## 2025-04-01 DIAGNOSIS — N40.1 BPH WITH OBSTRUCTION/LOWER URINARY TRACT SYMPTOMS: ICD-10-CM

## 2025-04-01 DIAGNOSIS — I50.42 CHRONIC COMBINED SYSTOLIC AND DIASTOLIC CONGESTIVE HEART FAILURE (HCC): ICD-10-CM

## 2025-04-01 DIAGNOSIS — N13.8 BPH WITH OBSTRUCTION/LOWER URINARY TRACT SYMPTOMS: ICD-10-CM

## 2025-04-01 DIAGNOSIS — Z09 HOSPITAL DISCHARGE FOLLOW-UP: Primary | ICD-10-CM

## 2025-04-01 PROBLEM — N40.0 BENIGN PROSTATIC HYPERPLASIA WITHOUT LOWER URINARY TRACT SYMPTOMS: Status: ACTIVE | Noted: 2023-07-31

## 2025-04-01 RX ORDER — TAMSULOSIN HYDROCHLORIDE 0.4 MG/1
0.4 CAPSULE ORAL NIGHTLY
Qty: 90 CAPSULE | Refills: 1 | Status: SHIPPED | OUTPATIENT
Start: 2025-04-01

## 2025-04-01 RX ORDER — GUAIFENESIN 600 MG/1
600 TABLET, EXTENDED RELEASE ORAL 2 TIMES DAILY
Qty: 30 TABLET | Refills: 0 | Status: SHIPPED | OUTPATIENT
Start: 2025-04-01 | End: 2025-04-16

## 2025-04-01 NOTE — PROGRESS NOTES
Post-Discharge Transitional Care  Follow Up      Deyvi Rose   YOB: 1934    Date of Office Visit:  4/1/2025  Date of Hospital Admission: 3/24/25  Date of Hospital Discharge: 3/26/25  Risk of hospital readmission (high >=14%. Medium >=10%) :Readmission Risk Score: 15.8      Care management risk score Rising risk (score 2-5) and Complex Care (Scores >=6): No Risk Score On File     Non face to face  following discharge, date last encounter closed (first attempt may have been earlier): 03/27/2025    Call initiated 2 business days of discharge: Yes    ASSESSMENT/PLAN:   Hospital discharge follow-up  -     DC DISCHARGE MEDS RECONCILED W/ CURRENT OUTPATIENT MED LIST  Chronic combined systolic and diastolic congestive heart failure (HCC)  BPH with obstruction/lower urinary tract symptoms  -     tamsulosin (FLOMAX) 0.4 MG capsule; Take 1 capsule by mouth at bedtime, Disp-90 capsule, R-1Normal      Medical Decision Making: moderate complexity  Return for Due for AWV next visit.           Subjective:   HPI:  Follow up of Hospital problems/diagnosis(es): Patient was admitted to the hospital with RSV pneumonia and mycoplasma pneumonia he was treated with antibiotics and steroids and is doing better and did not need to be on oxygen anymore and discharged back home.  He is here for follow-up is only concerned that it is difficult to get rid of the phlegm as it is thick he does not feel that the nebulizer is making much difference    Inpatient course: Discharge summary reviewed- see chart.    Interval history/Current status: Stable    Patient Active Problem List   Diagnosis    Gouty arthritis of toe of right foot    Hallux limitus, right    Sleep disorder, unspecified    DAT (obstructive sleep apnea)    Generalized osteoarthritis    Atrial fibrillation and flutter    Mixed hyperlipidemia    Persistent atrial fibrillation (HCC)    Opioid dependence with current use (HCC)    Secondary hypercoagulable state    Gait

## 2025-04-03 ENCOUNTER — CARE COORDINATION (OUTPATIENT)
Dept: CARE COORDINATION | Age: 89
End: 2025-04-03

## 2025-04-03 NOTE — CARE COORDINATION
Care Transitions Note    Follow Up Call     Attempted to reach patient, spouse/partner , (PHI form verified; on file) for transitions of care follow up.  Unable to reach patient, spouse/partner .      Outreach Attempts:   Multiple attempts to contact spouse/partner  at phone numbers on file.     Follow Up Appointment:   Future Appointments         Provider Specialty Dept Phone    5/21/2025 10:30 AM (Arrive by 10:15 AM) [facility] [phone]    5/27/2025 9:30 AM Dequan Bates MD Cardiology [phone]    7/10/2025 2:30 PM Heather Ascencio MD Primary Care [phone]            Plan for follow-up call in 6-10 days based on severity of symptoms and risk factors. Plan for next call: symptom management-mucus and cough improving? SOB/ALEXANDRA?     Rosa Phillips RN

## 2025-04-10 ENCOUNTER — CARE COORDINATION (OUTPATIENT)
Dept: CARE COORDINATION | Age: 89
End: 2025-04-10

## 2025-04-10 NOTE — CARE COORDINATION
Care Transitions Note    Follow Up Call     Attempted to reach patient, spouse/partner , (PHI form verified; on file) for transitions of care follow up.  Unable to reach spouse/partner .      Outreach Attempts:   Multiple attempts to contact patient, spouse/partner  at phone numbers on file.   HIPAA compliant voicemail left for patient, spouse/partner .   Will sign off.   Follow Up Appointment:   Future Appointments         Provider Specialty Dept Phone    5/21/2025 10:30 AM (Arrive by 10:15 AM) [facility] [phone]    5/27/2025 9:30 AM Dequan Bates MD Cardiology [phone]    7/10/2025 2:30 PM Heather Ascencio MD Primary Care [phone]            No further follow-up call indicated based on severity of symptoms and risk factors.     Rosa Phillips RN

## 2025-05-08 ENCOUNTER — TRANSCRIBE ORDERS (OUTPATIENT)
Dept: ADMINISTRATIVE | Age: 89
End: 2025-05-08

## 2025-05-08 DIAGNOSIS — C4A.62 MERKEL CELL CARCINOMA OF LEFT UPPER EXTREMITY INCLUDING SHOULDER (HCC): Primary | ICD-10-CM

## 2025-05-08 DIAGNOSIS — E03.9 HYPOTHYROIDISM, UNSPECIFIED TYPE: ICD-10-CM

## 2025-05-12 ENCOUNTER — TELEPHONE (OUTPATIENT)
Dept: CARDIOLOGY CLINIC | Age: 89
End: 2025-05-12

## 2025-05-12 RX ORDER — SODIUM CHLORIDE 9 MG/ML
INJECTION, SOLUTION INTRAVENOUS CONTINUOUS
OUTPATIENT
Start: 2025-05-19

## 2025-05-12 NOTE — TELEPHONE ENCOUNTER
CHEKO INSTRUCTIONS     I called Vineet Carnes's wife, to go over his CHEKO Instructions  Place: [facility]  Date & time of CHEKO: 5/19/25 at 10:30 am  Arrival Time: 9:30 am  NPO after midnight before CHEKO  Take the your morning medications with a sip of water the morning of the procedure:   Hold Lasix until after the CHEKO:   Take the last dose of vitamins or herbal supplements on 5/13  Must have a ride home after the procedure  More detailed instructions mailed to pt, emailed to [email or web address], and will be scanned into Epic.  All questions answered       Dr. Chi Appt:  5/27/25 at 9:30 am  / Arrival time: 9:00 am

## 2025-05-16 ENCOUNTER — TELEPHONE (OUTPATIENT)
Age: 89
End: 2025-05-16

## 2025-05-19 ENCOUNTER — APPOINTMENT (OUTPATIENT)
Dept: GENERAL RADIOLOGY | Age: 89
End: 2025-05-19
Payer: MEDICARE

## 2025-05-19 ENCOUNTER — HOSPITAL ENCOUNTER (EMERGENCY)
Age: 89
Discharge: HOME | End: 2025-05-19
Attending: EMERGENCY MEDICINE
Payer: MEDICARE

## 2025-05-19 ENCOUNTER — HOSPITAL ENCOUNTER (OUTPATIENT)
Age: 89
Discharge: HOME | End: 2025-05-19
Attending: INTERNAL MEDICINE

## 2025-05-19 VITALS
BODY MASS INDEX: 32.89 KG/M2 | DIASTOLIC BLOOD PRESSURE: 74 MMHG | TEMPERATURE: 98.4 F | OXYGEN SATURATION: 94 % | HEART RATE: 99 BPM | RESPIRATION RATE: 18 BRPM | SYSTOLIC BLOOD PRESSURE: 134 MMHG | WEIGHT: 210 LBS

## 2025-05-19 DIAGNOSIS — I50.9 ACUTE ON CHRONIC CONGESTIVE HEART FAILURE, UNSPECIFIED HEART FAILURE TYPE (HCC): Primary | ICD-10-CM

## 2025-05-19 LAB
ALBUMIN SERPL-MCNC: 2.8 G/DL (ref 3.5–5.2)
ALP SERPL-CCNC: 81 U/L (ref 40–129)
ALT SERPL-CCNC: 26 U/L (ref 0–40)
ANION GAP SERPL CALCULATED.3IONS-SCNC: 12 MMOL/L (ref 7–16)
AST SERPL-CCNC: 26 U/L (ref 0–39)
BASOPHILS # BLD: 0 K/UL (ref 0–0.2)
BASOPHILS NFR BLD: 0 % (ref 0–2)
BILIRUB SERPL-MCNC: 0.7 MG/DL (ref 0–1.2)
BNP SERPL-MCNC: 4991 PG/ML (ref 0–450)
BUN SERPL-MCNC: 22 MG/DL (ref 6–23)
CALCIUM SERPL-MCNC: 8.4 MG/DL (ref 8.6–10.2)
CHLORIDE SERPL-SCNC: 102 MMOL/L (ref 98–107)
CO2 SERPL-SCNC: 24 MMOL/L (ref 22–29)
CREAT SERPL-MCNC: 0.5 MG/DL (ref 0.7–1.2)
EOSINOPHIL # BLD: 0.09 K/UL (ref 0.05–0.5)
EOSINOPHILS RELATIVE PERCENT: 1 % (ref 0–6)
ERYTHROCYTE [DISTWIDTH] IN BLOOD BY AUTOMATED COUNT: 16.7 % (ref 11.5–15)
FLUAV RNA RESP QL NAA+PROBE: NOT DETECTED
FLUBV RNA RESP QL NAA+PROBE: NOT DETECTED
GFR, ESTIMATED: >90 ML/MIN/1.73M2
GLUCOSE SERPL-MCNC: 126 MG/DL (ref 74–99)
HCT VFR BLD AUTO: 40.2 % (ref 37–54)
HGB BLD-MCNC: 13.2 G/DL (ref 12.5–16.5)
LYMPHOCYTES NFR BLD: 0.86 K/UL (ref 1.5–4)
LYMPHOCYTES RELATIVE PERCENT: 9 % (ref 20–42)
MCH RBC QN AUTO: 28.8 PG (ref 26–35)
MCHC RBC AUTO-ENTMCNC: 32.8 G/DL (ref 32–34.5)
MCV RBC AUTO: 87.8 FL (ref 80–99.9)
MONOCYTES NFR BLD: 0.52 K/UL (ref 0.1–0.95)
MONOCYTES NFR BLD: 5 % (ref 2–12)
MYELOCYTES ABSOLUTE COUNT: 0.09 K/UL
MYELOCYTES: 1 %
NEUTROPHILS NFR BLD: 85 % (ref 43–80)
NEUTS SEG NFR BLD: 8.45 K/UL (ref 1.8–7.3)
PLATELET CONFIRMATION: NORMAL
PLATELET, FLUORESCENCE: 86 K/UL (ref 130–450)
PMV BLD AUTO: 13.8 FL (ref 7–12)
POTASSIUM SERPL-SCNC: 4 MMOL/L (ref 3.5–5)
PROT SERPL-MCNC: 5.6 G/DL (ref 6.4–8.3)
RBC # BLD AUTO: 4.58 M/UL (ref 3.8–5.8)
RBC # BLD: NORMAL 10*6/UL
RSV BY PCR: NOT DETECTED
SARS-COV-2 RNA RESP QL NAA+PROBE: NOT DETECTED
SODIUM SERPL-SCNC: 138 MMOL/L (ref 132–146)
SOURCE: NORMAL
SPECIMEN DESCRIPTION: NORMAL
SPECIMEN SOURCE: NORMAL
TROPONIN I SERPL HS-MCNC: 41 NG/L (ref 0–22)
TROPONIN I SERPL HS-MCNC: NORMAL NG/L (ref 0–22)
WBC OTHER # BLD: 10 K/UL (ref 4.5–11.5)

## 2025-05-19 PROCEDURE — 71045 X-RAY EXAM CHEST 1 VIEW: CPT

## 2025-05-19 PROCEDURE — 85025 COMPLETE CBC W/AUTO DIFF WBC: CPT

## 2025-05-19 PROCEDURE — 6360000002 HC RX W HCPCS

## 2025-05-19 PROCEDURE — 93005 ELECTROCARDIOGRAM TRACING: CPT

## 2025-05-19 PROCEDURE — 87634 RSV DNA/RNA AMP PROBE: CPT

## 2025-05-19 PROCEDURE — 84484 ASSAY OF TROPONIN QUANT: CPT

## 2025-05-19 PROCEDURE — 80053 COMPREHEN METABOLIC PANEL: CPT

## 2025-05-19 PROCEDURE — 83880 ASSAY OF NATRIURETIC PEPTIDE: CPT

## 2025-05-19 PROCEDURE — 99285 EMERGENCY DEPT VISIT HI MDM: CPT

## 2025-05-19 PROCEDURE — 96374 THER/PROPH/DIAG INJ IV PUSH: CPT

## 2025-05-19 PROCEDURE — 87636 SARSCOV2 & INF A&B AMP PRB: CPT

## 2025-05-19 RX ORDER — FUROSEMIDE 10 MG/ML
20 INJECTION INTRAMUSCULAR; INTRAVENOUS ONCE
Status: COMPLETED | OUTPATIENT
Start: 2025-05-19 | End: 2025-05-19

## 2025-05-19 RX ADMIN — FUROSEMIDE 20 MG: 10 INJECTION, SOLUTION INTRAMUSCULAR; INTRAVENOUS at 18:33

## 2025-05-19 NOTE — ED PROVIDER NOTES
[facility] EMERGENCY DEPARTMENT  EMERGENCY DEPARTMENT ENCOUNTER        Pt Name: Deyvi Rose  MRN: [id number]  Birthdate 4/14/1934  Date of evaluation: 5/19/2025  Provider: Kevin Syed DO  PCP: Heather Ascencio MD  Note Started: 1:21 PM EDT 5/19/25    CHIEF COMPLAINT       Chief Complaint   Patient presents with    Illness     Patient diagnosed with RSV a few weeks ago. States he still isn't feeling well. Cough.       HISTORY OF PRESENT ILLNESS: 1 or more Elements   History received from: Patient    Deyvi Rose is a 89 y.o. male who presents to the emergency department with chief complaint of generalized illness.  Patient states over the past few weeks he has been increasingly fatigued and has had a cough.  He states that the cough is productive of a thick yellow sputum.  He states he is more short of breath with exertion but at baseline does not normally get out of bed and only does what he calls stand ups.  Patient having chills but not any known fevers.  Otherwise denying any chest pain, abdominal pain, lightheadedness or dizziness, hematuria or dysuria, constipation or diarrhea.    Nursing Notes were all reviewed and agreed with or any disagreements were addressed in the HPI.    REVIEW OF SYSTEMS :    Positives and Pertinent negatives as per HPI.    PAST MEDICAL HISTORY/Chronic Conditions Affecting Care    has a past medical history of Anticoagulant long-term use, Arthritis, Atrial fibrillation (HCC), Atrial fibrillation, chronic (HCC), Cancer (HCC), Erectile dysfunction, GERD (gastroesophageal reflux disease), Hearing loss, HTN (hypertension), Neuropathy, Osteoarthritis, Peripheral vascular disease, and Sleep apnea.     SURGICAL HISTORY     Past Surgical History:   Procedure Laterality Date    CARDIOVERSION  03/28/2023    Successful   Dr Laughlin    CARDIOVERSION  04/24/2023    Successful  Dr. Laughlin    CARDIOVERSION  05/09/2023    Successful  Dr Laughlin    CARPAL TUNNEL RELEASE Right

## 2025-05-19 NOTE — DISCHARGE INSTRUCTIONS
Please call and follow-up with your family doctor soon as possible.  Schedule repeat echo with cardiologist.  For the next 3 days, double your Lasix dose for mild fluid overload.  Return to emergency department if you develop worsening shortness of breath, cough, or difficulty breathing.

## 2025-05-20 LAB
EKG ATRIAL RATE: 77 BPM
EKG Q-T INTERVAL: 358 MS
EKG QRS DURATION: 82 MS
EKG QTC CALCULATION (BAZETT): 442 MS
EKG R AXIS: 194 DEGREES
EKG T AXIS: 194 DEGREES
EKG VENTRICULAR RATE: 92 BPM

## 2025-05-20 PROCEDURE — 93010 ELECTROCARDIOGRAM REPORT: CPT | Performed by: INTERNAL MEDICINE

## 2025-05-20 NOTE — DISCHARGE INSTR - COC
Status/Restrictions: { CC Weight Bearin}  Other Medical Equipment (for information only, NOT a DME order):  {EQUIPMENT:608206001}  Other Treatments: ***    Patient's personal belongings (please select all that are sent with patient):  {CHP DME Belongings:651127867}    RN SIGNATURE:  {Esignature:[id number]}    CASE MANAGEMENT/SOCIAL WORK SECTION    Inpatient Status Date: ***    Readmission Risk Assessment Score:  [facility] RISK OF UNPLANNED READMISSION 2.0             0 Total Score        Discharging to Facility/ Agency   Name:   Address:  Phone:  Fax:    Dialysis Facility (if applicable)   Name:  Address:  Dialysis Schedule:  Phone:  Fax:    / signature: {Esignature:341965306}    PHYSICIAN SECTION    Prognosis: {Prognosis:3400753009}    Condition at Discharge: { Patient Condition:681989252}    Rehab Potential (if transferring to Rehab): {Prognosis:1911026365}    Recommended Labs or Other Treatments After Discharge: ***    Physician Certification: I certify the above information and transfer of Deyvi Rose  is necessary for the continuing treatment of the diagnosis listed and that he requires {Admit to Appropriate Level of Care:77980} for {GREATER/LESS:148509363} 30 days.     Update Admission H&P: {CHP DME Changes in HandP:788607307}    PHYSICIAN SIGNATURE:  {Esignature:093846428}

## 2025-05-22 ENCOUNTER — APPOINTMENT (OUTPATIENT)
Dept: GENERAL RADIOLOGY | Age: 89
DRG: 871 | End: 2025-05-22
Payer: MEDICARE

## 2025-05-22 ENCOUNTER — TELEPHONE (OUTPATIENT)
Dept: PRIMARY CARE CLINIC | Age: 89
End: 2025-05-22

## 2025-05-22 ENCOUNTER — HOSPITAL ENCOUNTER (INPATIENT)
Age: 89
LOS: 3 days | Discharge: HOME HEALTH | DRG: 871 | End: 2025-05-29
Attending: EMERGENCY MEDICINE | Admitting: FAMILY MEDICINE
Payer: MEDICARE

## 2025-05-22 DIAGNOSIS — R41.82 ALTERED MENTAL STATUS, UNSPECIFIED ALTERED MENTAL STATUS TYPE: ICD-10-CM

## 2025-05-22 DIAGNOSIS — G60.9 IDIOPATHIC PERIPHERAL NEUROPATHY: ICD-10-CM

## 2025-05-22 DIAGNOSIS — I48.91 ATRIAL FIBRILLATION AND FLUTTER (HCC): Primary | ICD-10-CM

## 2025-05-22 DIAGNOSIS — Z09 HOSPITAL DISCHARGE FOLLOW-UP: ICD-10-CM

## 2025-05-22 DIAGNOSIS — A41.9 SEPSIS, DUE TO UNSPECIFIED ORGANISM, UNSPECIFIED WHETHER ACUTE ORGAN DYSFUNCTION PRESENT (HCC): ICD-10-CM

## 2025-05-22 DIAGNOSIS — I50.32 CHRONIC HEART FAILURE WITH PRESERVED EJECTION FRACTION (HFPEF) (HCC): ICD-10-CM

## 2025-05-22 DIAGNOSIS — I48.92 ATRIAL FIBRILLATION AND FLUTTER (HCC): Primary | ICD-10-CM

## 2025-05-22 DIAGNOSIS — J96.21 ACUTE ON CHRONIC RESPIRATORY FAILURE WITH HYPOXIA (HCC): Primary | ICD-10-CM

## 2025-05-22 LAB
ALBUMIN SERPL-MCNC: 3.3 G/DL (ref 3.5–5.2)
ALP SERPL-CCNC: 103 U/L (ref 40–129)
ALT SERPL-CCNC: 40 U/L (ref 0–40)
ANION GAP SERPL CALCULATED.3IONS-SCNC: 11 MMOL/L (ref 7–16)
AST SERPL-CCNC: 43 U/L (ref 0–39)
BASOPHILS # BLD: 0.14 K/UL (ref 0–0.2)
BASOPHILS NFR BLD: 1 % (ref 0–2)
BILIRUB SERPL-MCNC: 1 MG/DL (ref 0–1.2)
BNP SERPL-MCNC: 4875 PG/ML (ref 0–450)
BUN SERPL-MCNC: 18 MG/DL (ref 6–23)
CALCIUM SERPL-MCNC: 8.7 MG/DL (ref 8.6–10.2)
CHLORIDE SERPL-SCNC: 96 MMOL/L (ref 98–107)
CO2 SERPL-SCNC: 31 MMOL/L (ref 22–29)
CREAT SERPL-MCNC: 0.5 MG/DL (ref 0.7–1.2)
EOSINOPHIL # BLD: 0.28 K/UL (ref 0.05–0.5)
EOSINOPHILS RELATIVE PERCENT: 2 % (ref 0–6)
ERYTHROCYTE [DISTWIDTH] IN BLOOD BY AUTOMATED COUNT: 16.8 % (ref 11.5–15)
GFR, ESTIMATED: >90 ML/MIN/1.73M2
GLUCOSE SERPL-MCNC: 157 MG/DL (ref 74–99)
HCT VFR BLD AUTO: 46 % (ref 37–54)
HGB BLD-MCNC: 14.8 G/DL (ref 12.5–16.5)
LACTATE BLDV-SCNC: 2.1 MMOL/L (ref 0.5–1.9)
LYMPHOCYTES NFR BLD: 1.13 K/UL (ref 1.5–4)
LYMPHOCYTES RELATIVE PERCENT: 7 % (ref 20–42)
MAGNESIUM SERPL-MCNC: 2.1 MG/DL (ref 1.6–2.6)
MCH RBC QN AUTO: 28.2 PG (ref 26–35)
MCHC RBC AUTO-ENTMCNC: 32.2 G/DL (ref 32–34.5)
MCV RBC AUTO: 87.6 FL (ref 80–99.9)
MONOCYTES NFR BLD: 0 % (ref 2–12)
MONOCYTES NFR BLD: 0 K/UL (ref 0.1–0.95)
NEUTROPHILS NFR BLD: 91 % (ref 43–80)
NEUTS SEG NFR BLD: 15.25 K/UL (ref 1.8–7.3)
PLATELET, FLUORESCENCE: 137 K/UL (ref 130–450)
PMV BLD AUTO: 14.1 FL (ref 7–12)
POTASSIUM SERPL-SCNC: 3.8 MMOL/L (ref 3.5–5)
PROT SERPL-MCNC: 6.7 G/DL (ref 6.4–8.3)
RBC # BLD AUTO: 5.25 M/UL (ref 3.8–5.8)
RBC # BLD: ABNORMAL 10*6/UL
RBC # BLD: ABNORMAL 10*6/UL
SODIUM SERPL-SCNC: 138 MMOL/L (ref 132–146)
TROPONIN I SERPL HS-MCNC: 67 NG/L (ref 0–22)
WBC OTHER # BLD: 16.8 K/UL (ref 4.5–11.5)

## 2025-05-22 PROCEDURE — 85025 COMPLETE CBC W/AUTO DIFF WBC: CPT

## 2025-05-22 PROCEDURE — 71045 X-RAY EXAM CHEST 1 VIEW: CPT

## 2025-05-22 PROCEDURE — 83735 ASSAY OF MAGNESIUM: CPT

## 2025-05-22 PROCEDURE — 6370000000 HC RX 637 (ALT 250 FOR IP): Performed by: EMERGENCY MEDICINE

## 2025-05-22 PROCEDURE — 96375 TX/PRO/DX INJ NEW DRUG ADDON: CPT

## 2025-05-22 PROCEDURE — 99285 EMERGENCY DEPT VISIT HI MDM: CPT

## 2025-05-22 PROCEDURE — 96361 HYDRATE IV INFUSION ADD-ON: CPT

## 2025-05-22 PROCEDURE — 80053 COMPREHEN METABOLIC PANEL: CPT

## 2025-05-22 PROCEDURE — 84484 ASSAY OF TROPONIN QUANT: CPT

## 2025-05-22 PROCEDURE — 93005 ELECTROCARDIOGRAM TRACING: CPT | Performed by: EMERGENCY MEDICINE

## 2025-05-22 PROCEDURE — 2500000003 HC RX 250 WO HCPCS: Performed by: EMERGENCY MEDICINE

## 2025-05-22 PROCEDURE — 87040 BLOOD CULTURE FOR BACTERIA: CPT

## 2025-05-22 PROCEDURE — 87636 SARSCOV2 & INF A&B AMP PRB: CPT

## 2025-05-22 PROCEDURE — 94640 AIRWAY INHALATION TREATMENT: CPT

## 2025-05-22 PROCEDURE — 6360000002 HC RX W HCPCS: Performed by: EMERGENCY MEDICINE

## 2025-05-22 PROCEDURE — 83605 ASSAY OF LACTIC ACID: CPT

## 2025-05-22 PROCEDURE — 2580000003 HC RX 258: Performed by: EMERGENCY MEDICINE

## 2025-05-22 PROCEDURE — 83880 ASSAY OF NATRIURETIC PEPTIDE: CPT

## 2025-05-22 RX ORDER — IBUPROFEN 800 MG/1
800 TABLET, FILM COATED ORAL ONCE
Status: COMPLETED | OUTPATIENT
Start: 2025-05-22 | End: 2025-05-22

## 2025-05-22 RX ORDER — IPRATROPIUM BROMIDE AND ALBUTEROL SULFATE 2.5; .5 MG/3ML; MG/3ML
2 SOLUTION RESPIRATORY (INHALATION) ONCE
Status: COMPLETED | OUTPATIENT
Start: 2025-05-22 | End: 2025-05-22

## 2025-05-22 RX ORDER — 0.9 % SODIUM CHLORIDE 0.9 %
1000 INTRAVENOUS SOLUTION INTRAVENOUS ONCE
Status: COMPLETED | OUTPATIENT
Start: 2025-05-22 | End: 2025-05-23

## 2025-05-22 RX ORDER — ACETAMINOPHEN 325 MG/1
650 TABLET ORAL ONCE
Status: COMPLETED | OUTPATIENT
Start: 2025-05-22 | End: 2025-05-22

## 2025-05-22 RX ADMIN — IPRATROPIUM BROMIDE AND ALBUTEROL SULFATE 2 DOSE: .5; 2.5 SOLUTION RESPIRATORY (INHALATION) at 22:01

## 2025-05-22 RX ADMIN — IBUPROFEN 800 MG: 800 TABLET, FILM COATED ORAL at 23:22

## 2025-05-22 RX ADMIN — SODIUM CHLORIDE 1000 ML: 0.9 INJECTION, SOLUTION INTRAVENOUS at 23:28

## 2025-05-22 RX ADMIN — ACETAMINOPHEN 650 MG: 325 TABLET ORAL at 23:04

## 2025-05-22 RX ADMIN — SODIUM CHLORIDE 1000 ML: 0.9 INJECTION, SOLUTION INTRAVENOUS at 23:11

## 2025-05-22 RX ADMIN — WATER 2000 MG: 1 INJECTION INTRAMUSCULAR; INTRAVENOUS; SUBCUTANEOUS at 23:13

## 2025-05-22 NOTE — TELEPHONE ENCOUNTER
Received call from [facility] that pt was in the ER this week. Pt has had a functional decline and they are suggesting pt to be seen at the CHF clinic.  Also requesting order for skilled nursing to be sent to [facility]. He is already getting PT from them  Both pended.

## 2025-05-23 ENCOUNTER — APPOINTMENT (OUTPATIENT)
Dept: CT IMAGING | Age: 89
DRG: 871 | End: 2025-05-23
Payer: MEDICARE

## 2025-05-23 PROBLEM — I48.92 ATRIAL FIBRILLATION AND FLUTTER (HCC): Status: RESOLVED | Noted: 2023-01-20 | Resolved: 2025-05-23

## 2025-05-23 PROBLEM — I48.91 ATRIAL FIBRILLATION AND FLUTTER (HCC): Status: RESOLVED | Noted: 2023-01-20 | Resolved: 2025-05-23

## 2025-05-23 PROBLEM — L89.302 PRESSURE INJURY OF BUTTOCK, STAGE 2 (HCC): Status: RESOLVED | Noted: 2024-10-13 | Resolved: 2025-05-23

## 2025-05-23 PROBLEM — F11.20 OPIOID DEPENDENCE WITH CURRENT USE (HCC): Status: RESOLVED | Noted: 2023-04-21 | Resolved: 2025-05-23

## 2025-05-23 PROBLEM — M10.9 GOUTY ARTHRITIS OF TOE OF RIGHT FOOT: Status: RESOLVED | Noted: 2018-07-25 | Resolved: 2025-05-23

## 2025-05-23 PROBLEM — M25.812 IMPINGEMENT OF LEFT SHOULDER: Status: RESOLVED | Noted: 2023-11-06 | Resolved: 2025-05-23

## 2025-05-23 PROBLEM — G47.9 SLEEP DISORDER, UNSPECIFIED: Status: RESOLVED | Noted: 2022-08-16 | Resolved: 2025-05-23

## 2025-05-23 PROBLEM — J96.01 ACUTE RESPIRATORY FAILURE WITH HYPOXIA (HCC): Status: RESOLVED | Noted: 2025-03-24 | Resolved: 2025-05-23

## 2025-05-23 PROBLEM — J15.7 MYCOPLASMAL PNEUMONIA: Status: RESOLVED | Noted: 2025-03-26 | Resolved: 2025-05-23

## 2025-05-23 PROBLEM — M20.5X1 HALLUX LIMITUS, RIGHT: Status: RESOLVED | Noted: 2018-07-25 | Resolved: 2025-05-23

## 2025-05-23 PROBLEM — I48.0 PAROXYSMAL ATRIAL FIBRILLATION (HCC): Status: RESOLVED | Noted: 2023-04-24 | Resolved: 2025-05-23

## 2025-05-23 PROBLEM — D69.6 THROMBOCYTOPENIA: Status: RESOLVED | Noted: 2025-03-26 | Resolved: 2025-05-23

## 2025-05-23 PROBLEM — L89.152 SACRAL DECUBITUS ULCER, STAGE II (HCC): Status: RESOLVED | Noted: 2024-09-14 | Resolved: 2025-05-23

## 2025-05-23 PROBLEM — R55 SYNCOPE: Status: RESOLVED | Noted: 2023-04-21 | Resolved: 2025-05-23

## 2025-05-23 PROBLEM — I48.20 ATRIAL FIBRILLATION, CHRONIC (HCC): Status: RESOLVED | Noted: 2025-03-26 | Resolved: 2025-05-23

## 2025-05-23 PROBLEM — D68.69 SECONDARY HYPERCOAGULABLE STATE: Status: RESOLVED | Noted: 2023-04-21 | Resolved: 2025-05-23

## 2025-05-23 PROBLEM — Z75.8: Status: ACTIVE | Noted: 2025-05-23

## 2025-05-23 PROBLEM — B33.8 RSV (RESPIRATORY SYNCYTIAL VIRUS INFECTION): Status: RESOLVED | Noted: 2025-03-25 | Resolved: 2025-05-23

## 2025-05-23 LAB
B PARAP IS1001 DNA NPH QL NAA+NON-PROBE: NOT DETECTED
B PERT DNA SPEC QL NAA+PROBE: NOT DETECTED
B.E.: 2.5 MMOL/L (ref -3–3)
BACTERIA URNS QL MICRO: ABNORMAL
BILIRUB UR QL STRIP: NEGATIVE
C PNEUM DNA NPH QL NAA+NON-PROBE: NOT DETECTED
CLARITY UR: CLEAR
COHB: 1.6 % (ref 0–1.5)
COLOR UR: YELLOW
CRITICAL: ABNORMAL
DATE ANALYZED: ABNORMAL
DATE OF COLLECTION: ABNORMAL
EKG ATRIAL RATE: 86 BPM
EKG Q-T INTERVAL: 332 MS
EKG QRS DURATION: 84 MS
EKG QTC CALCULATION (BAZETT): 430 MS
EKG R AXIS: 11 DEGREES
EKG T AXIS: 13 DEGREES
EKG VENTRICULAR RATE: 101 BPM
FLUAV RNA NPH QL NAA+NON-PROBE: NOT DETECTED
FLUAV RNA RESP QL NAA+PROBE: NOT DETECTED
FLUBV RNA NPH QL NAA+NON-PROBE: NOT DETECTED
FLUBV RNA RESP QL NAA+PROBE: NOT DETECTED
GLUCOSE UR STRIP-MCNC: NEGATIVE MG/DL
HADV DNA NPH QL NAA+NON-PROBE: NOT DETECTED
HCO3: 27.3 MMOL/L (ref 22–26)
HCOV 229E RNA NPH QL NAA+NON-PROBE: NOT DETECTED
HCOV HKU1 RNA NPH QL NAA+NON-PROBE: NOT DETECTED
HCOV NL63 RNA NPH QL NAA+NON-PROBE: NOT DETECTED
HCOV OC43 RNA NPH QL NAA+NON-PROBE: NOT DETECTED
HGB UR QL STRIP.AUTO: ABNORMAL
HHB: 3 % (ref 0–5)
HMPV RNA NPH QL NAA+NON-PROBE: NOT DETECTED
HPIV1 RNA NPH QL NAA+NON-PROBE: NOT DETECTED
HPIV2 RNA NPH QL NAA+NON-PROBE: NOT DETECTED
HPIV3 RNA NPH QL NAA+NON-PROBE: NOT DETECTED
HPIV4 RNA NPH QL NAA+NON-PROBE: NOT DETECTED
KETONES UR STRIP-MCNC: NEGATIVE MG/DL
LAB: ABNORMAL
LACTATE BLDV-SCNC: 1.5 MMOL/L (ref 0.5–1.9)
LEUKOCYTE ESTERASE UR QL STRIP: NEGATIVE
Lab: 520
M PNEUMO DNA NPH QL NAA+NON-PROBE: NOT DETECTED
METHB: 0.3 % (ref 0–1.5)
MODE: ABNORMAL
NITRITE UR QL STRIP: NEGATIVE
O2 CONTENT: 18.1 ML/DL
O2 SATURATION: 96.9 % (ref 92–98.5)
O2HB: 95.1 % (ref 94–97)
OPERATOR ID: 8219
PATIENT TEMP: 37 C
PCO2: 42.9 MMHG (ref 35–45)
PH BLOOD GAS: 7.42 (ref 7.35–7.45)
PH UR STRIP: 5.5 [PH] (ref 5–8)
PO2: 87.9 MMHG (ref 75–100)
PROCALCITONIN SERPL-MCNC: 0.32 NG/ML (ref 0–0.08)
PROT UR STRIP-MCNC: NEGATIVE MG/DL
RBC #/AREA URNS HPF: ABNORMAL /HPF
RSV RNA NPH QL NAA+NON-PROBE: NOT DETECTED
RV+EV RNA NPH QL NAA+NON-PROBE: NOT DETECTED
SARS-COV-2 RNA NPH QL NAA+NON-PROBE: NOT DETECTED
SARS-COV-2 RNA RESP QL NAA+PROBE: NOT DETECTED
SOURCE, BLOOD GAS: ABNORMAL
SOURCE: NORMAL
SP GR UR STRIP: 1.01 (ref 1–1.03)
SPECIMEN DESCRIPTION: NORMAL
SPECIMEN DESCRIPTION: NORMAL
THB: 13.5 G/DL (ref 11.5–16.5)
TIME ANALYZED: 532
TROPONIN I SERPL HS-MCNC: 67 NG/L (ref 0–22)
UROBILINOGEN UR STRIP-ACNC: 1 EU/DL (ref 0–1)
WBC #/AREA URNS HPF: ABNORMAL /HPF

## 2025-05-23 PROCEDURE — 2500000003 HC RX 250 WO HCPCS: Performed by: FAMILY MEDICINE

## 2025-05-23 PROCEDURE — 70450 CT HEAD/BRAIN W/O DYE: CPT

## 2025-05-23 PROCEDURE — 71275 CT ANGIOGRAPHY CHEST: CPT

## 2025-05-23 PROCEDURE — G0378 HOSPITAL OBSERVATION PER HR: HCPCS

## 2025-05-23 PROCEDURE — 84484 ASSAY OF TROPONIN QUANT: CPT

## 2025-05-23 PROCEDURE — 6360000002 HC RX W HCPCS: Performed by: EMERGENCY MEDICINE

## 2025-05-23 PROCEDURE — 6370000000 HC RX 637 (ALT 250 FOR IP): Performed by: FAMILY MEDICINE

## 2025-05-23 PROCEDURE — 84145 PROCALCITONIN (PCT): CPT

## 2025-05-23 PROCEDURE — 96365 THER/PROPH/DIAG IV INF INIT: CPT

## 2025-05-23 PROCEDURE — 2580000003 HC RX 258: Performed by: EMERGENCY MEDICINE

## 2025-05-23 PROCEDURE — 0202U NFCT DS 22 TRGT SARS-COV-2: CPT

## 2025-05-23 PROCEDURE — 74177 CT ABD & PELVIS W/CONTRAST: CPT

## 2025-05-23 PROCEDURE — 86738 MYCOPLASMA ANTIBODY: CPT

## 2025-05-23 PROCEDURE — 87449 NOS EACH ORGANISM AG IA: CPT

## 2025-05-23 PROCEDURE — 6360000004 HC RX CONTRAST MEDICATION: Performed by: RADIOLOGY

## 2025-05-23 PROCEDURE — 87899 AGENT NOS ASSAY W/OPTIC: CPT

## 2025-05-23 PROCEDURE — 96366 THER/PROPH/DIAG IV INF ADDON: CPT

## 2025-05-23 PROCEDURE — 83605 ASSAY OF LACTIC ACID: CPT

## 2025-05-23 PROCEDURE — 93010 ELECTROCARDIOGRAM REPORT: CPT | Performed by: INTERNAL MEDICINE

## 2025-05-23 PROCEDURE — 99222 1ST HOSP IP/OBS MODERATE 55: CPT | Performed by: FAMILY MEDICINE

## 2025-05-23 PROCEDURE — 81001 URINALYSIS AUTO W/SCOPE: CPT

## 2025-05-23 PROCEDURE — 96361 HYDRATE IV INFUSION ADD-ON: CPT

## 2025-05-23 PROCEDURE — 82805 BLOOD GASES W/O2 SATURATION: CPT

## 2025-05-23 RX ORDER — ALLOPURINOL 100 MG/1
100 TABLET ORAL DAILY
Status: DISCONTINUED | OUTPATIENT
Start: 2025-05-23 | End: 2025-05-29 | Stop reason: HOSPADM

## 2025-05-23 RX ORDER — IPRATROPIUM BROMIDE AND ALBUTEROL SULFATE 2.5; .5 MG/3ML; MG/3ML
1 SOLUTION RESPIRATORY (INHALATION) EVERY 4 HOURS PRN
Status: DISCONTINUED | OUTPATIENT
Start: 2025-05-23 | End: 2025-05-29 | Stop reason: HOSPADM

## 2025-05-23 RX ORDER — M-VIT,TX,IRON,MINS/CALC/FOLIC 27MG-0.4MG
1 TABLET ORAL EVERY EVENING
Status: DISCONTINUED | OUTPATIENT
Start: 2025-05-23 | End: 2025-05-29 | Stop reason: HOSPADM

## 2025-05-23 RX ORDER — TAMSULOSIN HYDROCHLORIDE 0.4 MG/1
0.4 CAPSULE ORAL NIGHTLY
Status: DISCONTINUED | OUTPATIENT
Start: 2025-05-23 | End: 2025-05-29 | Stop reason: HOSPADM

## 2025-05-23 RX ORDER — LANOLIN ALCOHOL/MO/W.PET/CERES
250 CREAM (GRAM) TOPICAL DAILY
Status: DISCONTINUED | OUTPATIENT
Start: 2025-05-23 | End: 2025-05-29 | Stop reason: HOSPADM

## 2025-05-23 RX ORDER — ASPIRIN 81 MG/1
81 TABLET, CHEWABLE ORAL DAILY
Status: DISCONTINUED | OUTPATIENT
Start: 2025-05-23 | End: 2025-05-29 | Stop reason: HOSPADM

## 2025-05-23 RX ORDER — ENOXAPARIN SODIUM 100 MG/ML
40 INJECTION SUBCUTANEOUS DAILY
Status: DISCONTINUED | OUTPATIENT
Start: 2025-05-23 | End: 2025-05-27

## 2025-05-23 RX ORDER — FUROSEMIDE 40 MG/1
40 TABLET ORAL DAILY
Status: DISCONTINUED | OUTPATIENT
Start: 2025-05-23 | End: 2025-05-29 | Stop reason: HOSPADM

## 2025-05-23 RX ORDER — ACETAMINOPHEN 650 MG/1
650 SUPPOSITORY RECTAL EVERY 6 HOURS PRN
Status: DISCONTINUED | OUTPATIENT
Start: 2025-05-23 | End: 2025-05-29 | Stop reason: HOSPADM

## 2025-05-23 RX ORDER — SODIUM CHLORIDE 0.9 % (FLUSH) 0.9 %
5-40 SYRINGE (ML) INJECTION PRN
Status: DISCONTINUED | OUTPATIENT
Start: 2025-05-23 | End: 2025-05-29 | Stop reason: HOSPADM

## 2025-05-23 RX ORDER — METOPROLOL SUCCINATE 25 MG/1
25 TABLET, EXTENDED RELEASE ORAL DAILY
Status: DISCONTINUED | OUTPATIENT
Start: 2025-05-23 | End: 2025-05-29 | Stop reason: HOSPADM

## 2025-05-23 RX ORDER — CALCIUM CARBONATE 500 MG/1
1 TABLET, CHEWABLE ORAL DAILY
Status: DISCONTINUED | OUTPATIENT
Start: 2025-05-23 | End: 2025-05-29 | Stop reason: HOSPADM

## 2025-05-23 RX ORDER — ACETAMINOPHEN 325 MG/1
650 TABLET ORAL EVERY 6 HOURS PRN
Status: DISCONTINUED | OUTPATIENT
Start: 2025-05-23 | End: 2025-05-29 | Stop reason: HOSPADM

## 2025-05-23 RX ORDER — IOPAMIDOL 755 MG/ML
75 INJECTION, SOLUTION INTRAVASCULAR
Status: COMPLETED | OUTPATIENT
Start: 2025-05-23 | End: 2025-05-23

## 2025-05-23 RX ORDER — SODIUM CHLORIDE 0.9 % (FLUSH) 0.9 %
5-40 SYRINGE (ML) INJECTION EVERY 12 HOURS SCHEDULED
Status: DISCONTINUED | OUTPATIENT
Start: 2025-05-23 | End: 2025-05-29 | Stop reason: HOSPADM

## 2025-05-23 RX ADMIN — Medication 10 ML: at 09:38

## 2025-05-23 RX ADMIN — Medication 10 ML: at 21:35

## 2025-05-23 RX ADMIN — CYANOCOBALAMIN TAB 1000 MCG 250 MCG: 1000 TAB at 09:31

## 2025-05-23 RX ADMIN — FUROSEMIDE 40 MG: 40 TABLET ORAL at 09:30

## 2025-05-23 RX ADMIN — METOPROLOL SUCCINATE 25 MG: 25 TABLET, FILM COATED, EXTENDED RELEASE ORAL at 09:29

## 2025-05-23 RX ADMIN — ALLOPURINOL 100 MG: 100 TABLET ORAL at 09:30

## 2025-05-23 RX ADMIN — CHOLECALCIFEROL TAB 125 MCG (5000 UNIT) 5000 UNITS: 125 TAB at 09:31

## 2025-05-23 RX ADMIN — ASPIRIN 81 MG CHEWABLE TABLET 81 MG: 81 TABLET CHEWABLE at 09:28

## 2025-05-23 RX ADMIN — CALCIUM CARBONATE 500 MG: 500 TABLET, CHEWABLE ORAL at 09:32

## 2025-05-23 RX ADMIN — IOPAMIDOL 75 ML: 755 INJECTION, SOLUTION INTRAVENOUS at 02:36

## 2025-05-23 RX ADMIN — TAMSULOSIN HYDROCHLORIDE 0.4 MG: 0.4 CAPSULE ORAL at 21:33

## 2025-05-23 RX ADMIN — AZITHROMYCIN DIHYDRATE 500 MG: 500 INJECTION, POWDER, LYOPHILIZED, FOR SOLUTION INTRAVENOUS at 05:32

## 2025-05-23 ASSESSMENT — PAIN SCALES - GENERAL
PAINLEVEL_OUTOF10: 0
PAINLEVEL_OUTOF10: 0

## 2025-05-23 NOTE — CARE COORDINATION
Case Management Assessment  Initial Evaluation    Date/Time of Evaluation: 5/23/2025 2:46 PM  Assessment Completed by: FELICIA Robb    If patient is discharged prior to next notation, then this note serves as note for discharge by case management.    Patient Name: Deyvi Rose                   YOB: 1934  Diagnosis: Acute on chronic respiratory failure with hypoxia (HCC) [J96.21]  Altered mental status, unspecified altered mental status type [R41.82]  Sepsis, due to unspecified organism, unspecified whether acute organ dysfunction present (HCC) [A41.9]  Concerned about care plan [Z75.8]                   Date / Time: 5/22/2025  8:52 PM    Patient Admission Status: Observation   Readmission Risk (Low < 19, Mod (19-27), High > 27): Readmission Risk Score: 15.8    Current PCP: Heather Ascencio MD  PCP verified by CM? Yes    Chart Reviewed: Yes      History Provided by: Medical Record  Patient Orientation: Unable to Assess, Other (see comment) (pt presents with AMS)    Patient Cognition: Other (see comment) (pt confused)    Hospitalization in the last 30 days (Readmission):  No    If yes, Readmission Assessment in CM Navigator will be completed.    Advance Directives:      Code Status: Full Code   Patient's Primary Decision Maker is:      Primary Decision Maker: Kenyetta Rose - Spouse - [phone]    Secondary Decision Maker: Myra Langley - Child - [phone]    Supplemental (Other) Decision Maker: Willa Gutierrez - Child - [phone]    Supplemental (Other) Decision Maker: Janie Deyvi Callahan - Child - [phone]    Discharge Planning:    Patient lives with:   Type of Home:    Primary Care Giver: Spouse  Patient Support Systems include:     Current Financial resources:    Current community resources:    Current services prior to admission:              Current DME:              Type of Home Care services:       ADLS  Prior functional level: Other (see comment) (unknown)  Current functional  [R41.82]  Sepsis, due to unspecified organism, unspecified whether acute organ dysfunction present (HCC) [A41.9]  Concerned about care plan [Z75.8]    IF APPLICABLE: The Patient and/or patient representative Deyvi and his family were provided with a choice of provider and agrees with the discharge plan. Freedom of choice list with basic dialogue that supports the patient's individualized plan of care/goals and shares the quality data associated with the providers was provided to:     Patient Representative Name:       The Patient and/or Patient Representative Agree with the Discharge Plan?      FELICIA Robb  Case Management Department  Ph: [phone]

## 2025-05-23 NOTE — CARE COORDINATION
5/23/25 SS Note: Pt w/fever, leukocytosis, SOB & CHF. Pt has chronic AFib s/p watchman & takes metoprolol. Pt on RA 4L 02 n/c and no hx of 02 but does use CPAP thru [employer]. PT/OT to eval & SNF list given. Pt not sure if he will go to SNF or return home w/[facility]. [facility] can accept back & WILL NEED Care port complete for return of care. Will NEED PRCERT, KAYLI & PASRR if SNF as pt is OBS. Pt is  & resides w/wife & is currently bedbound and has a hospital bed in their sunroom, ramp to enter. DME includes home cpap, Balaji lift, sit-to-stand, and a chair lift to go upstairs. Pt is active w/[facility] for PT/OT and has HHA's 5hrs day 6 days week via [facility] - [facility]. Pt w/hx [facility]. But will not go back there.  Electronically signed by FELICIA Manuel on 5/23/2025 at 3:23 PM

## 2025-05-23 NOTE — ED PROVIDER NOTES
Chief complaint:  Short of breath, altered mental status      HPI history provided by the patient and report  Patient presents here from home with some poor information with the patient.  Apparently he was short of breath earlier, he received a breathing treatment by EMS which resolved the symptoms, apparently EMS had him slightly hypoxic in the high 80s which resolved after breathing treatment.  The patient does admit that he felt short of breath earlier but does not now.  He states otherwise he is really not sure why he is here.  The report was also the family member stated he seemed a little confused today.  No falls or injury reported.  The patient really gives no other useful information.    Review of Systems   Unable to perform ROS: Mental status change        Physical Exam  Vitals and nursing note reviewed.   Constitutional:       General: He is awake. He is not in acute distress.     Appearance: He is well-developed. He is not ill-appearing, toxic-appearing or diaphoretic.      Comments: Awake and alert, somewhat confused in conversation.   HENT:      Head: Normocephalic and atraumatic.      Comments: No sign of acute head or face injury  Eyes:      General: No scleral icterus.     Pupils: Pupils are equal, round, and reactive to light.   Cardiovascular:      Rate and Rhythm: Regular rhythm. Tachycardia present.      Heart sounds: Normal heart sounds. No murmur heard.     Comments: Minimally tachycardic  Pulmonary:      Effort: Pulmonary effort is normal. No respiratory distress.      Breath sounds: No stridor, decreased air movement or transmitted upper airway sounds. Decreased breath sounds and wheezing present. No rhonchi or rales.      Comments: Mild diminished breath sounds anteriorly with some scattered adventitial sounds with mild wheeze, no distress.  Chest:      Chest wall: No tenderness.   Abdominal:      General: Bowel sounds are normal. There is no distension.      Palpations: Abdomen is soft.     15.0 %    MPV 14.1 (H) 7.0 - 12.0 fL    Platelet, Fluorescence 137 130 - 450 k/uL    Neutrophils % 91 (H) 43.0 - 80.0 %    Lymphocytes % 7 (L) 20.0 - 42.0 %    Monocytes % 0 (L) 2.0 - 12.0 %    Eosinophils % 2 0 - 6 %    Basophils % 1 0.0 - 2.0 %    Neutrophils Absolute 15.25 (H) 1.80 - 7.30 k/uL    Lymphocytes Absolute 1.13 (L) 1.50 - 4.00 k/uL    Monocytes Absolute 0.00 (L) 0.10 - 0.95 k/uL    Eosinophils Absolute 0.28 0.05 - 0.50 k/uL    Basophils Absolute 0.14 0.00 - 0.20 k/uL    RBC Morphology 1+ ANISOCYTOSIS     RBC Morphology 1+ POLYCHROMASIA    Comprehensive Metabolic Panel   Result Value Ref Range    Sodium 138 132 - 146 mmol/L    Potassium 3.8 3.5 - 5.0 mmol/L    Chloride 96 (L) 98 - 107 mmol/L    CO2 31 (H) 22 - 29 mmol/L    Anion Gap 11 7 - 16 mmol/L    Glucose 157 (H) 74 - 99 mg/dL    BUN 18 6 - 23 mg/dL    Creatinine 0.5 (L) 0.70 - 1.20 mg/dL    Est, Glom Filt Rate >90 >60 mL/min/1.73m2    Calcium 8.7 8.6 - 10.2 mg/dL    Total Protein 6.7 6.4 - 8.3 g/dL    Albumin 3.3 (L) 3.5 - 5.2 g/dL    Total Bilirubin 1.0 0.0 - 1.2 mg/dL    Alkaline Phosphatase 103 40 - 129 U/L    ALT 40 0 - 40 U/L    AST 43 (H) 0 - 39 U/L   Magnesium   Result Value Ref Range    Magnesium 2.1 1.6 - 2.6 mg/dL   Urinalysis   Result Value Ref Range    Color, UA Yellow Yellow    Turbidity UA Clear Clear    Glucose, Ur NEGATIVE NEGATIVE mg/dL    Bilirubin, Urine NEGATIVE NEGATIVE    Ketones, Urine NEGATIVE NEGATIVE mg/dL    Specific Gravity, UA 1.015 1.005 - 1.030    Urine Hgb MODERATE (A) NEGATIVE    pH, Urine 5.5 5.0 - 8.0    Protein, UA NEGATIVE NEGATIVE mg/dL    Urobilinogen, Urine 1.0 0.0 - 1.0 EU/dL    Nitrite, Urine NEGATIVE NEGATIVE    Leukocyte Esterase, Urine NEGATIVE NEGATIVE   Troponin   Result Value Ref Range    Troponin, High Sensitivity 67 (H) 0 - 22 ng/L   Brain Natriuretic Peptide   Result Value Ref Range    NT Pro-BNP 4,875 (H) 0 - 450 pg/mL   Troponin   Result Value Ref Range    Troponin, High Sensitivity 67 (H) 0 -

## 2025-05-23 NOTE — H&P
[facility]ist [facility]   History and Physical      CHIEF COMPLAINT:  confusion, SOB    History of Present Illness:  89 y.o. male with a history of AFib s/p watchman, chronic 2L O2, HTN, CHF, PVD presents with confusion, SOB.  Patient is a poor historian.  He does report a cough.  Per ED documentation patient had SOB earlier which resolved with aerosol by EMS.  Family reported patient had been confused.  Chart review shows patient was in ED 5/19 with cough, diagnosed with CHF exacerbation.  He was given 20mg IV lasix and d/c home.  He returns to ED today.  Workup significant for WBC 16.8, CO2 31, lactic 2.1, repeat 1.5, troponin 67 and 67, pro-BNP 4875, glucose 157.  UA not consistent with infection.  CT head no acute finding.  CTA chest showed cardiomegaly and small pericardial effusion.  CT abd/pelvis no acute finding, did show splenomegaly.  While in ED patient developed fever of 101.2.  He has no hypoxia on his baseline oxygen.      Informant(s) for H&P: patient, chart    REVIEW OF SYSTEMS:  no fevers, chills, cp,  n/v, ha, vision/hearing changes, wt changes, hot/cold flashes, other open skin lesions, diarrhea, constipation, dysuria/hematuria unless noted in HPI. Complete ROS performed with the patient and is otherwise negative.      PMH:  Past Medical History:   Diagnosis Date    Anticoagulant long-term use     Arthritis     Atrial fibrillation (HCC)     Atrial fibrillation, chronic (HCC)     Cancer (HCC)     Erectile dysfunction     GERD (gastroesophageal reflux disease)     Hearing loss     HTN (hypertension)     Neuropathy     Osteoarthritis     Peripheral vascular disease     Sleep apnea        Surgical History:  Past Surgical History:   Procedure Laterality Date    CARDIOVERSION  03/28/2023    Successful   Dr Laughlin    CARDIOVERSION  04/24/2023    Successful  Dr. Laughlin    CARDIOVERSION  05/09/2023    Successful  Dr Laughlin    CARPAL TUNNEL RELEASE Right 2017    CARPAL TUNNEL

## 2025-05-23 NOTE — ACP (ADVANCE CARE PLANNING)
Advance Care Planning   Healthcare Decision Maker:    Primary Decision Maker: Kenyetta Rose - Spouse - [phone]    Secondary Decision Maker: LangleyMyra - Child - [phone]    Supplemental (Other) Decision Maker: Willa Gutierrez - Child - [phone]    Supplemental (Other) Decision Maker: Janie Deyvi Callahan - Child - [phone]    Click here to complete Healthcare Decision Makers including selection of the Healthcare Decision Maker Relationship (ie \"Primary\").  Today we documented Decision Maker(s) consistent with Legal Next of Kin hierarchy.

## 2025-05-23 NOTE — PROGRESS NOTES
This patient was admitted by my colleague on this calender day, a few hours before my shift began.    History of present illness from History and Physical:  89 y.o. male with a history of AFib s/p watchman, chronic 2L O2, HTN, CHF, PVD presents with confusion, SOB.  Patient is a poor historian.  He does report a cough.  Per ED documentation patient had SOB earlier which resolved with aerosol by EMS.  Family reported patient had been confused.  Chart review shows patient was in ED 5/19 with cough, diagnosed with CHF exacerbation.  He was given 20mg IV lasix and d/c home.  He returns to ED today.  Workup significant for WBC 16.8, CO2 31, lactic 2.1, repeat 1.5, troponin 67 and 67, pro-BNP 4875, glucose 157.  UA not consistent with infection.  CT head no acute finding.  CTA chest showed cardiomegaly and small pericardial effusion.  CT abd/pelvis no acute finding, did show splenomegaly.  While in ED patient developed fever of 101.2.  He has no hypoxia on his baseline oxygen.     Sob, fever, inc wbc: Procalcitonin, Respiratory panel mycoplasma IgM urinary antigens for strep and Legionella procalcitonin high at .32.  Supplemental oxygen not documented?    Chf hx? 2d echo 7/24 ef 60% mild valvular abnl, moderate LVH, possible pericardial effusion. Lasix  40 qd  Hypertension so far has been normal blood pressure since arrival  Chronic A-fib status post watchman is on metoprolol continue  DVT prophylaxis: Lovenox  Full code.  Disposition: Home in 2 or 3 days

## 2025-05-23 NOTE — PROGRESS NOTES
4 Eyes Skin Assessment     NAME:  Deyvi Rose  YOB: 1934  MEDICAL RECORD NUMBER:  [id number]    The patient is being assessed for  Admission    I agree that at least one RN has performed a thorough Head to Toe Skin Assessment on the patient. ALL assessment sites listed below have been assessed.      Areas assessed by both nurses:    Head, Face, Ears, Shoulders, Back, Chest, Arms, Elbows, Hands, Sacrum. Buttock, Coccyx, Ischium, and Legs. Feet and Heels        Does the Patient have a Wound? Yes wound(s) were present on assessment. LDA wound assessment was Initiated and completed by RN       Melchor Prevention initiated by RN: No  Wound Care Orders initiated by RN: No    Pressure Injury (Stage 3,4, Unstageable, DTI, NWPT, and Complex wounds) if present, place Wound referral order by RN under : No    New Ostomies, if present place, Ostomy referral order under : No     Nurse 1 eSignature: Electronically signed by Josselyn Barrett RN on 5/23/25 at 7:03 PM EDT    **SHARE this note so that the co-signing nurse can place an eSignature**    Nurse 2 eSignature: Electronically signed by Esther Singer RN on 5/23/25 at 7:03 PM EDT

## 2025-05-24 LAB
ERYTHROCYTE [DISTWIDTH] IN BLOOD BY AUTOMATED COUNT: 16.8 % (ref 11.5–15)
HCT VFR BLD AUTO: 41.5 % (ref 37–54)
HGB BLD-MCNC: 13.2 G/DL (ref 12.5–16.5)
L PNEUMO1 AG UR QL IA.RAPID: NEGATIVE
MCH RBC QN AUTO: 28.4 PG (ref 26–35)
MCHC RBC AUTO-ENTMCNC: 31.8 G/DL (ref 32–34.5)
MCV RBC AUTO: 89.2 FL (ref 80–99.9)
PLATELET, FLUORESCENCE: 131 K/UL (ref 130–450)
PMV BLD AUTO: 14.2 FL (ref 7–12)
RBC # BLD AUTO: 4.65 M/UL (ref 3.8–5.8)
S PNEUM AG SPEC QL: NEGATIVE
SPECIMEN SOURCE: NORMAL
WBC OTHER # BLD: 14.2 K/UL (ref 4.5–11.5)

## 2025-05-24 PROCEDURE — 99232 SBSQ HOSP IP/OBS MODERATE 35: CPT | Performed by: INTERNAL MEDICINE

## 2025-05-24 PROCEDURE — 6360000002 HC RX W HCPCS: Performed by: INTERNAL MEDICINE

## 2025-05-24 PROCEDURE — 2500000003 HC RX 250 WO HCPCS: Performed by: FAMILY MEDICINE

## 2025-05-24 PROCEDURE — 6360000002 HC RX W HCPCS: Performed by: FAMILY MEDICINE

## 2025-05-24 PROCEDURE — G0378 HOSPITAL OBSERVATION PER HR: HCPCS

## 2025-05-24 PROCEDURE — 96366 THER/PROPH/DIAG IV INF ADDON: CPT

## 2025-05-24 PROCEDURE — 2580000003 HC RX 258: Performed by: EMERGENCY MEDICINE

## 2025-05-24 PROCEDURE — 85027 COMPLETE CBC AUTOMATED: CPT

## 2025-05-24 PROCEDURE — 6360000002 HC RX W HCPCS: Performed by: EMERGENCY MEDICINE

## 2025-05-24 PROCEDURE — 96372 THER/PROPH/DIAG INJ SC/IM: CPT

## 2025-05-24 PROCEDURE — 2500000003 HC RX 250 WO HCPCS: Performed by: INTERNAL MEDICINE

## 2025-05-24 PROCEDURE — 36415 COLL VENOUS BLD VENIPUNCTURE: CPT

## 2025-05-24 PROCEDURE — 6370000000 HC RX 637 (ALT 250 FOR IP): Performed by: FAMILY MEDICINE

## 2025-05-24 PROCEDURE — 96376 TX/PRO/DX INJ SAME DRUG ADON: CPT

## 2025-05-24 PROCEDURE — 94640 AIRWAY INHALATION TREATMENT: CPT

## 2025-05-24 RX ADMIN — ASPIRIN 81 MG CHEWABLE TABLET 81 MG: 81 TABLET CHEWABLE at 09:14

## 2025-05-24 RX ADMIN — CALCIUM CARBONATE 500 MG: 500 TABLET, CHEWABLE ORAL at 09:13

## 2025-05-24 RX ADMIN — ACETAMINOPHEN 650 MG: 325 TABLET ORAL at 09:13

## 2025-05-24 RX ADMIN — WATER 1000 MG: 1 INJECTION INTRAMUSCULAR; INTRAVENOUS; SUBCUTANEOUS at 11:00

## 2025-05-24 RX ADMIN — Medication 10 ML: at 21:41

## 2025-05-24 RX ADMIN — METOPROLOL SUCCINATE 25 MG: 25 TABLET, FILM COATED, EXTENDED RELEASE ORAL at 09:14

## 2025-05-24 RX ADMIN — AZITHROMYCIN DIHYDRATE 500 MG: 500 INJECTION, POWDER, LYOPHILIZED, FOR SOLUTION INTRAVENOUS at 03:56

## 2025-05-24 RX ADMIN — Medication 10 ML: at 09:14

## 2025-05-24 RX ADMIN — TAMSULOSIN HYDROCHLORIDE 0.4 MG: 0.4 CAPSULE ORAL at 21:34

## 2025-05-24 RX ADMIN — FUROSEMIDE 40 MG: 40 TABLET ORAL at 09:14

## 2025-05-24 RX ADMIN — ENOXAPARIN SODIUM 40 MG: 100 INJECTION SUBCUTANEOUS at 09:12

## 2025-05-24 RX ADMIN — CYANOCOBALAMIN TAB 1000 MCG 250 MCG: 1000 TAB at 09:12

## 2025-05-24 RX ADMIN — ALLOPURINOL 100 MG: 100 TABLET ORAL at 09:14

## 2025-05-24 RX ADMIN — CHOLECALCIFEROL TAB 125 MCG (5000 UNIT) 5000 UNITS: 125 TAB at 09:14

## 2025-05-24 RX ADMIN — Medication 1 TABLET: at 16:53

## 2025-05-24 RX ADMIN — IPRATROPIUM BROMIDE AND ALBUTEROL SULFATE 1 DOSE: .5; 2.5 SOLUTION RESPIRATORY (INHALATION) at 17:18

## 2025-05-24 ASSESSMENT — PAIN SCALES - GENERAL
PAINLEVEL_OUTOF10: 4
PAINLEVEL_OUTOF10: 7

## 2025-05-24 ASSESSMENT — PAIN DESCRIPTION - DESCRIPTORS: DESCRIPTORS: ACHING;SHARP

## 2025-05-24 ASSESSMENT — PAIN DESCRIPTION - LOCATION: LOCATION: ABDOMEN

## 2025-05-24 NOTE — PROGRESS NOTES
Admitting Date and Time: 5/22/2025  8:52 PM  Admit Dx: Acute on chronic respiratory failure with hypoxia (HCC) [J96.21]  Altered mental status, unspecified altered mental status type [R41.82]  Sepsis, due to unspecified organism, unspecified whether acute organ dysfunction present (HCC) [A41.9]  Concerned about care plan [Z75.8]    Subjective:    Pt feels well  Per RN: no issues    ROS: denies fever, chills, cp, sob, n/v, HA unless stated above.     azithromycin  500 mg IntraVENous Q24H    sodium chloride flush  5-40 mL IntraVENous 2 times per day    enoxaparin  40 mg SubCUTAneous Daily    allopurinol  100 mg Oral Daily    aspirin  81 mg Oral Daily    calcium carbonate  1 tablet Oral Daily    furosemide  40 mg Oral Daily    metoprolol succinate  25 mg Oral Daily    therapeutic multivitamin-minerals  1 tablet Oral QPM    tamsulosin  0.4 mg Oral Nightly    vitamin B-12  250 mcg Oral Daily    vitamin D3  5,000 Units Oral Daily     sodium chloride flush, 5-40 mL, PRN  acetaminophen, 650 mg, Q6H PRN   Or  acetaminophen, 650 mg, Q6H PRN  ipratropium 0.5 mg-albuterol 2.5 mg, 1 Dose, Q4H PRN         Objective:    /80   Pulse 90   Temp 97.7 °F (36.5 °C) (Oral)   Resp 18   Ht 1.702 m (5' 7\")   Wt 114.3 kg (252 lb)   SpO2 97%   BMI 39.47 kg/m²   General Appearance: alert and oriented to person, place and time and in no acute distress  Skin: warm and dry  Head: normocephalic and atraumatic  Eyes: pupils equal, round, and reactive to light, extraocular eye movements intact, conjunctivae normal  Neck: neck supple and non tender without mass   Pulmonary/Chest: clear to auscultation bilaterally- no wheezes, rales or rhonchi, normal air movement, no respiratory distress  Cardiovascular: normal rate, normal S1 and S2 and no carotid bruits  Abdomen: soft, non-tender, non-distended, normal bowel sounds, no masses or organomegaly  Extremities: no cyanosis, no clubbing and trace edema  Neurologic: no cranial nerve deficit

## 2025-05-25 ENCOUNTER — APPOINTMENT (OUTPATIENT)
Dept: GENERAL RADIOLOGY | Age: 89
DRG: 871 | End: 2025-05-25
Payer: MEDICARE

## 2025-05-25 LAB
ERYTHROCYTE [DISTWIDTH] IN BLOOD BY AUTOMATED COUNT: 16.1 % (ref 11.5–15)
HCT VFR BLD AUTO: 40.4 % (ref 37–54)
HGB BLD-MCNC: 12.8 G/DL (ref 12.5–16.5)
MCH RBC QN AUTO: 27.9 PG (ref 26–35)
MCHC RBC AUTO-ENTMCNC: 31.7 G/DL (ref 32–34.5)
MCV RBC AUTO: 88.2 FL (ref 80–99.9)
PLATELET, FLUORESCENCE: 134 K/UL (ref 130–450)
PMV BLD AUTO: 13.6 FL (ref 7–12)
RBC # BLD AUTO: 4.58 M/UL (ref 3.8–5.8)
WBC OTHER # BLD: 10.7 K/UL (ref 4.5–11.5)

## 2025-05-25 PROCEDURE — 6360000002 HC RX W HCPCS: Performed by: INTERNAL MEDICINE

## 2025-05-25 PROCEDURE — 97165 OT EVAL LOW COMPLEX 30 MIN: CPT | Performed by: OCCUPATIONAL THERAPIST

## 2025-05-25 PROCEDURE — 96366 THER/PROPH/DIAG IV INF ADDON: CPT

## 2025-05-25 PROCEDURE — 99232 SBSQ HOSP IP/OBS MODERATE 35: CPT | Performed by: INTERNAL MEDICINE

## 2025-05-25 PROCEDURE — 2500000003 HC RX 250 WO HCPCS: Performed by: FAMILY MEDICINE

## 2025-05-25 PROCEDURE — 6370000000 HC RX 637 (ALT 250 FOR IP): Performed by: FAMILY MEDICINE

## 2025-05-25 PROCEDURE — 36415 COLL VENOUS BLD VENIPUNCTURE: CPT

## 2025-05-25 PROCEDURE — 85027 COMPLETE CBC AUTOMATED: CPT

## 2025-05-25 PROCEDURE — 2700000000 HC OXYGEN THERAPY PER DAY

## 2025-05-25 PROCEDURE — 2500000003 HC RX 250 WO HCPCS: Performed by: INTERNAL MEDICINE

## 2025-05-25 PROCEDURE — G0378 HOSPITAL OBSERVATION PER HR: HCPCS

## 2025-05-25 PROCEDURE — 96376 TX/PRO/DX INJ SAME DRUG ADON: CPT

## 2025-05-25 PROCEDURE — 6370000000 HC RX 637 (ALT 250 FOR IP): Performed by: INTERNAL MEDICINE

## 2025-05-25 PROCEDURE — 6360000002 HC RX W HCPCS: Performed by: EMERGENCY MEDICINE

## 2025-05-25 PROCEDURE — 2580000003 HC RX 258: Performed by: EMERGENCY MEDICINE

## 2025-05-25 PROCEDURE — 71046 X-RAY EXAM CHEST 2 VIEWS: CPT

## 2025-05-25 RX ORDER — PREGABALIN 100 MG/1
100 CAPSULE ORAL 2 TIMES DAILY
Status: DISCONTINUED | OUTPATIENT
Start: 2025-05-25 | End: 2025-05-29 | Stop reason: HOSPADM

## 2025-05-25 RX ADMIN — POLYVINYL ALCOHOL, POVIDONE 2 DROP: 14; 6 SOLUTION/ DROPS OPHTHALMIC at 18:20

## 2025-05-25 RX ADMIN — POLYVINYL ALCOHOL, POVIDONE 2 DROP: 14; 6 SOLUTION/ DROPS OPHTHALMIC at 19:37

## 2025-05-25 RX ADMIN — PREGABALIN 100 MG: 100 CAPSULE ORAL at 21:35

## 2025-05-25 RX ADMIN — WATER 1000 MG: 1 INJECTION INTRAMUSCULAR; INTRAVENOUS; SUBCUTANEOUS at 11:33

## 2025-05-25 RX ADMIN — Medication 10 ML: at 19:37

## 2025-05-25 RX ADMIN — METOPROLOL SUCCINATE 25 MG: 25 TABLET, FILM COATED, EXTENDED RELEASE ORAL at 08:48

## 2025-05-25 RX ADMIN — AZITHROMYCIN DIHYDRATE 500 MG: 500 INJECTION, POWDER, LYOPHILIZED, FOR SOLUTION INTRAVENOUS at 03:52

## 2025-05-25 RX ADMIN — ALLOPURINOL 100 MG: 100 TABLET ORAL at 08:49

## 2025-05-25 RX ADMIN — FUROSEMIDE 40 MG: 40 TABLET ORAL at 08:48

## 2025-05-25 RX ADMIN — Medication 1 TABLET: at 17:45

## 2025-05-25 RX ADMIN — CHOLECALCIFEROL TAB 125 MCG (5000 UNIT) 5000 UNITS: 125 TAB at 08:48

## 2025-05-25 RX ADMIN — TAMSULOSIN HYDROCHLORIDE 0.4 MG: 0.4 CAPSULE ORAL at 19:37

## 2025-05-25 RX ADMIN — CYANOCOBALAMIN TAB 1000 MCG 250 MCG: 1000 TAB at 08:48

## 2025-05-25 RX ADMIN — ASPIRIN 81 MG CHEWABLE TABLET 81 MG: 81 TABLET CHEWABLE at 08:48

## 2025-05-25 RX ADMIN — CALCIUM CARBONATE 500 MG: 500 TABLET, CHEWABLE ORAL at 08:48

## 2025-05-25 RX ADMIN — Medication 10 ML: at 08:50

## 2025-05-25 NOTE — PROGRESS NOTES
Admitting Date and Time: 5/22/2025  8:52 PM  Admit Dx: Acute on chronic respiratory failure with hypoxia (HCC) [J96.21]  Altered mental status, unspecified altered mental status type [R41.82]  Sepsis, due to unspecified organism, unspecified whether acute organ dysfunction present (HCC) [A41.9]  Concerned about care plan [Z75.8]    Subjective:    Pt feels well  Per RN: no issues    ROS: denies fever, chills, cp, sob, n/v, HA unless stated above.     cefTRIAXone (ROCEPHIN) IV  1,000 mg IntraVENous Q24H    azithromycin  500 mg IntraVENous Q24H    sodium chloride flush  5-40 mL IntraVENous 2 times per day    enoxaparin  40 mg SubCUTAneous Daily    allopurinol  100 mg Oral Daily    aspirin  81 mg Oral Daily    calcium carbonate  1 tablet Oral Daily    furosemide  40 mg Oral Daily    metoprolol succinate  25 mg Oral Daily    therapeutic multivitamin-minerals  1 tablet Oral QPM    tamsulosin  0.4 mg Oral Nightly    vitamin B-12  250 mcg Oral Daily    vitamin D3  5,000 Units Oral Daily     sodium chloride flush, 5-40 mL, PRN  acetaminophen, 650 mg, Q6H PRN   Or  acetaminophen, 650 mg, Q6H PRN  ipratropium 0.5 mg-albuterol 2.5 mg, 1 Dose, Q4H PRN         Objective:    /82   Pulse (!) 101   Temp 98.4 °F (36.9 °C) (Oral)   Resp 19   Ht 1.702 m (5' 7\")   Wt 114.8 kg (253 lb)   SpO2 94%   BMI 39.63 kg/m²   General Appearance: alert and oriented to person, place and time and in no acute distress  Skin: warm and dry  Head: normocephalic and atraumatic  Eyes: pupils equal, round, and reactive to light, extraocular eye movements intact, conjunctivae normal  Neck: neck supple and non tender without mass   Pulmonary/Chest: clear to auscultation bilaterally- no wheezes, rales or rhonchi, normal air movement, no respiratory distress  Cardiovascular: normal rate, normal S1 and S2 and no carotid bruits  Abdomen: soft, non-tender, non-distended, normal bowel sounds, no masses or organomegaly  Extremities: no cyanosis, no  every other day.   Metabolic encephalopathy noticeably greatly today. 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  Disposition: Home in 1 or 2 days, possibly with home o2    Tory explained that he was in ed 5/19 with a fever.  We clarified that he had chills but no reports of fever.  Tory points out that chills are very significant given his age.  I agreed.  Wife then asked about what with Dr Mccray do in the future and if she will change testing and treatment plans.  I deferred this to later.  Tory explained that they are happy with my care but will like to add Dr Walsh to the team as he helped before.  We concluded by refocusing on current plan to Continue current care, consult dr walsh and new Hospitalist tomorrow during change of service.    NOTE: This report was transcribed using voice recognition software. Every effort was made to ensure accuracy; however, inadvertent computerized transcription errors may be present.     Electronically signed by RONNIE ONOFRE MD on 5/25/2025 at 10:18 AM

## 2025-05-25 NOTE — PROGRESS NOTES
OCCUPATIONAL THERAPY INITIAL EVALUATION    [facility]  [address] [facility] [address] [facility]         Date:2025                                                   Patient Name: Deyvi Rose     MRN: [id number]     : 1934     Room: [address] [place] [address]       Evaluating OT: Kimi London, OTR/L; [id number]       Referring Provider and Orders/Date:    OT eval and treat  Start:  25,   End:  25,   ONE TIME,   Standing Count:  1 Occurrences,   R       Donell, Sharyn BRADFORD DO    Recommended placement: Would benefit from subacute rehab or HH therapy with HHA and family assist . Either one dependent on home assistance levels       Diagnosis:   1. Acute on chronic respiratory failure with hypoxia (HCC)    2. Sepsis, due to unspecified organism, unspecified whether acute organ dysfunction present (HCC)    3. Altered mental status, unspecified altered mental status type         Surgery: none      Pertinent Medical History:        Past Medical History:   Diagnosis Date    Anticoagulant long-term use     Arthritis     Atrial fibrillation (HCC)     Atrial fibrillation, chronic (HCC)     Cancer (HCC)     Erectile dysfunction     GERD (gastroesophageal reflux disease)     Hearing loss     HTN (hypertension)     Neuropathy     Osteoarthritis     Peripheral vascular disease     Sleep apnea           Past Surgical History:   Procedure Laterality Date    CARDIOVERSION  2023    Successful   Dr Laughlin    CARDIOVERSION  2023    Successful  Dr. Laughlin    CARDIOVERSION  2023    Successful  Dr Laughlin    CARPAL TUNNEL RELEASE Right     CARPAL TUNNEL RELEASE Left     COLECTOMY      for polyps    COLONOSCOPY      ELBOW SURGERY      tendon reattachment    EP DEVICE PROCEDURE N/A 2024    Watchman nael closure device performed by Dequan Bates MD at [facility] CARDIAC CATH LAB    EYE SURGERY Bilateral     cataracts    FOOT SURGERY Right 2018

## 2025-05-25 NOTE — PLAN OF CARE
Problem: Safety - Adult  Goal: Free from fall injury  Outcome: Progressing     Problem: Chronic Conditions and Co-morbidities  Goal: Patient's chronic conditions and co-morbidity symptoms are monitored and maintained or improved  Outcome: Progressing     Problem: Discharge Planning  Goal: Discharge to home or other facility with appropriate resources  Outcome: Progressing     Problem: Pain  Goal: Verbalizes/displays adequate comfort level or baseline comfort level  Outcome: Progressing     Problem: Skin/Tissue Integrity  Goal: Skin integrity remains intact  Description: 1.  Monitor for areas of redness and/or skin breakdown2.  Assess vascular access sites hourly3.  Every 4-6 hours minimum:  Change oxygen saturation probe site4.  Every 4-6 hours:  If on nasal continuous positive airway pressure, respiratory therapy assess nares and determine need for appliance change or resting period  Outcome: Progressing     Problem: ABCDS Injury Assessment  Goal: Absence of physical injury  Outcome: Progressing     Problem: Neurosensory - Adult  Goal: Achieves maximal functionality and self care  Outcome: Progressing     Problem: Respiratory - Adult  Goal: Achieves optimal ventilation and oxygenation  Outcome: Progressing     Problem: Skin/Tissue Integrity - Adult  Goal: Skin integrity remains intact  Description: 1.  Monitor for areas of redness and/or skin breakdown2.  Assess vascular access sites hourly3.  Every 4-6 hours minimum:  Change oxygen saturation probe site4.  Every 4-6 hours:  If on nasal continuous positive airway pressure, respiratory therapy assess nares and determine need for appliance change or resting period  Outcome: Progressing     Problem: Musculoskeletal - Adult  Goal: Return ADL status to a safe level of function  Outcome: Progressing     Problem: Genitourinary - Adult  Goal: Urinary catheter remains patent  Outcome: Progressing

## 2025-05-26 PROBLEM — I50.33 ACUTE ON CHRONIC DIASTOLIC CHF (CONGESTIVE HEART FAILURE) (HCC): Status: ACTIVE | Noted: 2025-05-26

## 2025-05-26 LAB
ALBUMIN SERPL-MCNC: 2.7 G/DL (ref 3.5–5.2)
ALP SERPL-CCNC: 78 U/L (ref 40–129)
ALT SERPL-CCNC: 32 U/L (ref 0–40)
ANION GAP SERPL CALCULATED.3IONS-SCNC: 12 MMOL/L (ref 7–16)
AST SERPL-CCNC: 34 U/L (ref 0–39)
BASOPHILS # BLD: 0.05 K/UL (ref 0–0.2)
BASOPHILS NFR BLD: 1 % (ref 0–2)
BILIRUB SERPL-MCNC: 0.5 MG/DL (ref 0–1.2)
BUN SERPL-MCNC: 16 MG/DL (ref 6–23)
CALCIUM SERPL-MCNC: 8.4 MG/DL (ref 8.6–10.2)
CHLORIDE SERPL-SCNC: 102 MMOL/L (ref 98–107)
CO2 SERPL-SCNC: 28 MMOL/L (ref 22–29)
CREAT SERPL-MCNC: 0.5 MG/DL (ref 0.7–1.2)
EOSINOPHIL # BLD: 0.34 K/UL (ref 0.05–0.5)
EOSINOPHILS RELATIVE PERCENT: 4 % (ref 0–6)
ERYTHROCYTE [DISTWIDTH] IN BLOOD BY AUTOMATED COUNT: 16.1 % (ref 11.5–15)
ERYTHROCYTE [DISTWIDTH] IN BLOOD BY AUTOMATED COUNT: 16.4 % (ref 11.5–15)
GFR, ESTIMATED: >90 ML/MIN/1.73M2
GLUCOSE SERPL-MCNC: 186 MG/DL (ref 74–99)
HCT VFR BLD AUTO: 40.3 % (ref 37–54)
HCT VFR BLD AUTO: 42.3 % (ref 37–54)
HGB BLD-MCNC: 13 G/DL (ref 12.5–16.5)
HGB BLD-MCNC: 13.2 G/DL (ref 12.5–16.5)
IMM GRANULOCYTES # BLD AUTO: 0.27 K/UL (ref 0–0.58)
IMM GRANULOCYTES NFR BLD: 3 % (ref 0–5)
LYMPHOCYTES NFR BLD: 1.01 K/UL (ref 1.5–4)
LYMPHOCYTES RELATIVE PERCENT: 11 % (ref 20–42)
MAGNESIUM SERPL-MCNC: 1.9 MG/DL (ref 1.6–2.6)
MCH RBC QN AUTO: 28 PG (ref 26–35)
MCH RBC QN AUTO: 28.6 PG (ref 26–35)
MCHC RBC AUTO-ENTMCNC: 31.2 G/DL (ref 32–34.5)
MCHC RBC AUTO-ENTMCNC: 32.3 G/DL (ref 32–34.5)
MCV RBC AUTO: 88.8 FL (ref 80–99.9)
MCV RBC AUTO: 89.8 FL (ref 80–99.9)
MONOCYTES NFR BLD: 0.71 K/UL (ref 0.1–0.95)
MONOCYTES NFR BLD: 8 % (ref 2–12)
NEUTROPHILS NFR BLD: 73 % (ref 43–80)
NEUTS SEG NFR BLD: 6.51 K/UL (ref 1.8–7.3)
PLATELET # BLD AUTO: 139 K/UL (ref 130–450)
PLATELET, FLUORESCENCE: 141 K/UL (ref 130–450)
PMV BLD AUTO: 12.9 FL (ref 7–12)
PMV BLD AUTO: 13.5 FL (ref 7–12)
POTASSIUM SERPL-SCNC: 3 MMOL/L (ref 3.5–5)
PROT SERPL-MCNC: 5.5 G/DL (ref 6.4–8.3)
RBC # BLD AUTO: 4.54 M/UL (ref 3.8–5.8)
RBC # BLD AUTO: 4.71 M/UL (ref 3.8–5.8)
SODIUM SERPL-SCNC: 142 MMOL/L (ref 132–146)
WBC OTHER # BLD: 8.9 K/UL (ref 4.5–11.5)
WBC OTHER # BLD: 9 K/UL (ref 4.5–11.5)

## 2025-05-26 PROCEDURE — 2500000003 HC RX 250 WO HCPCS: Performed by: FAMILY MEDICINE

## 2025-05-26 PROCEDURE — 96366 THER/PROPH/DIAG IV INF ADDON: CPT

## 2025-05-26 PROCEDURE — 6360000002 HC RX W HCPCS: Performed by: FAMILY MEDICINE

## 2025-05-26 PROCEDURE — 2580000003 HC RX 258: Performed by: EMERGENCY MEDICINE

## 2025-05-26 PROCEDURE — 80053 COMPREHEN METABOLIC PANEL: CPT

## 2025-05-26 PROCEDURE — 85027 COMPLETE CBC AUTOMATED: CPT

## 2025-05-26 PROCEDURE — 99232 SBSQ HOSP IP/OBS MODERATE 35: CPT | Performed by: FAMILY MEDICINE

## 2025-05-26 PROCEDURE — 85025 COMPLETE CBC W/AUTO DIFF WBC: CPT

## 2025-05-26 PROCEDURE — 96376 TX/PRO/DX INJ SAME DRUG ADON: CPT

## 2025-05-26 PROCEDURE — 97161 PT EVAL LOW COMPLEX 20 MIN: CPT | Performed by: PHYSICAL THERAPIST

## 2025-05-26 PROCEDURE — 6370000000 HC RX 637 (ALT 250 FOR IP): Performed by: FAMILY MEDICINE

## 2025-05-26 PROCEDURE — 96372 THER/PROPH/DIAG INJ SC/IM: CPT

## 2025-05-26 PROCEDURE — 1200000000 HC SEMI PRIVATE

## 2025-05-26 PROCEDURE — 36415 COLL VENOUS BLD VENIPUNCTURE: CPT

## 2025-05-26 PROCEDURE — 83735 ASSAY OF MAGNESIUM: CPT

## 2025-05-26 PROCEDURE — 6360000002 HC RX W HCPCS: Performed by: INTERNAL MEDICINE

## 2025-05-26 PROCEDURE — 2500000003 HC RX 250 WO HCPCS: Performed by: INTERNAL MEDICINE

## 2025-05-26 PROCEDURE — 2700000000 HC OXYGEN THERAPY PER DAY

## 2025-05-26 PROCEDURE — 96375 TX/PRO/DX INJ NEW DRUG ADDON: CPT

## 2025-05-26 PROCEDURE — 6360000002 HC RX W HCPCS: Performed by: EMERGENCY MEDICINE

## 2025-05-26 RX ORDER — FUROSEMIDE 10 MG/ML
20 INJECTION INTRAMUSCULAR; INTRAVENOUS 2 TIMES DAILY
Status: DISCONTINUED | OUTPATIENT
Start: 2025-05-26 | End: 2025-05-29 | Stop reason: HOSPADM

## 2025-05-26 RX ORDER — SENNOSIDES 8.6 MG/1
1 TABLET ORAL 2 TIMES DAILY
Status: DISCONTINUED | OUTPATIENT
Start: 2025-05-26 | End: 2025-05-29 | Stop reason: HOSPADM

## 2025-05-26 RX ORDER — NEOMYCIN SULFATE, POLYMYXIN B SULFATE AND DEXAMETHASONE 3.5; 10000; 1 MG/ML; [USP'U]/ML; MG/ML
1 SUSPENSION/ DROPS OPHTHALMIC EVERY 6 HOURS SCHEDULED
Status: DISCONTINUED | OUTPATIENT
Start: 2025-05-26 | End: 2025-05-29 | Stop reason: HOSPADM

## 2025-05-26 RX ADMIN — ENOXAPARIN SODIUM 40 MG: 100 INJECTION SUBCUTANEOUS at 08:01

## 2025-05-26 RX ADMIN — SENNOSIDES 8.6 MG: 8.6 TABLET, FILM COATED ORAL at 21:20

## 2025-05-26 RX ADMIN — Medication 1 TABLET: at 17:31

## 2025-05-26 RX ADMIN — PREGABALIN 100 MG: 100 CAPSULE ORAL at 21:20

## 2025-05-26 RX ADMIN — Medication 10 ML: at 08:02

## 2025-05-26 RX ADMIN — WATER 1000 MG: 1 INJECTION INTRAMUSCULAR; INTRAVENOUS; SUBCUTANEOUS at 08:00

## 2025-05-26 RX ADMIN — POTASSIUM BICARBONATE 50 MEQ: 978 TABLET, EFFERVESCENT ORAL at 21:27

## 2025-05-26 RX ADMIN — PREGABALIN 100 MG: 100 CAPSULE ORAL at 08:01

## 2025-05-26 RX ADMIN — CHOLECALCIFEROL TAB 125 MCG (5000 UNIT) 5000 UNITS: 125 TAB at 08:01

## 2025-05-26 RX ADMIN — FUROSEMIDE 40 MG: 40 TABLET ORAL at 08:01

## 2025-05-26 RX ADMIN — FUROSEMIDE 20 MG: 10 INJECTION, SOLUTION INTRAMUSCULAR; INTRAVENOUS at 11:32

## 2025-05-26 RX ADMIN — SENNOSIDES 8.6 MG: 8.6 TABLET, FILM COATED ORAL at 11:32

## 2025-05-26 RX ADMIN — TAMSULOSIN HYDROCHLORIDE 0.4 MG: 0.4 CAPSULE ORAL at 21:20

## 2025-05-26 RX ADMIN — CALCIUM CARBONATE 500 MG: 500 TABLET, CHEWABLE ORAL at 08:01

## 2025-05-26 RX ADMIN — NEOMYCIN SULFATE, POLYMYXIN B SULFATE AND DEXAMETHASONE 1 DROP: 3.5; 10000; 1 SUSPENSION/ DROPS OPHTHALMIC at 13:39

## 2025-05-26 RX ADMIN — AZITHROMYCIN DIHYDRATE 500 MG: 500 INJECTION, POWDER, LYOPHILIZED, FOR SOLUTION INTRAVENOUS at 03:54

## 2025-05-26 RX ADMIN — POTASSIUM BICARBONATE 50 MEQ: 978 TABLET, EFFERVESCENT ORAL at 11:32

## 2025-05-26 RX ADMIN — ALLOPURINOL 100 MG: 100 TABLET ORAL at 08:01

## 2025-05-26 RX ADMIN — FUROSEMIDE 20 MG: 10 INJECTION, SOLUTION INTRAMUSCULAR; INTRAVENOUS at 17:31

## 2025-05-26 RX ADMIN — ASPIRIN 81 MG CHEWABLE TABLET 81 MG: 81 TABLET CHEWABLE at 08:01

## 2025-05-26 RX ADMIN — METOPROLOL SUCCINATE 25 MG: 25 TABLET, FILM COATED, EXTENDED RELEASE ORAL at 08:01

## 2025-05-26 RX ADMIN — CYANOCOBALAMIN TAB 1000 MCG 250 MCG: 1000 TAB at 08:01

## 2025-05-26 RX ADMIN — NEOMYCIN SULFATE, POLYMYXIN B SULFATE AND DEXAMETHASONE 1 DROP: 3.5; 10000; 1 SUSPENSION/ DROPS OPHTHALMIC at 17:31

## 2025-05-26 RX ADMIN — Medication 10 ML: at 21:22

## 2025-05-26 ASSESSMENT — PAIN SCALES - GENERAL: PAINLEVEL_OUTOF10: 0

## 2025-05-26 NOTE — PROGRESS NOTES
[facility] Hospitalist Progress Note    Admitting Date and Time: 5/22/2025  8:52 PM  Admit Dx: Acute on chronic respiratory failure with hypoxia (HCC) [J96.21]  Altered mental status, unspecified altered mental status type [R41.82]  Sepsis, due to unspecified organism, unspecified whether acute organ dysfunction present (HCC) [A41.9]  Concerned about care plan [Z75.8]      Assessment:    Principal Problem:    Concerned about care plan  Active Problems:    Presence of Watchman left atrial appendage closure device    Persistent atrial fibrillation (HCC)    Chronic heart failure with preserved ejection fraction (HFpEF) ([facility])  Resolved Problems:    * No resolved hospital problems. *      Plan:  5/23/2025  Fever  Leukocytosis  SOB  - prn duonebs  - f/u respiratory panel  - admitted in March with RSV and mycoplasma pneumonia  - no pneumonia on imaging, will observe off antibiotics     CHF  - lasix 40mg daily  - intake/output  - daily weight     HTN  - normotensive in ED  - trend BP     Chronic AFib  S/p watchman device  - rate control with metoprolol     Sob, fever, inc wbc: Procalcitonin, Respiratory panel mycoplasma IgM urinary antigens for strep and Legionella procalcitonin high at .32.  Supplemental oxygen not documented?     Chf hx? 2d echo 7/24 ef 60% mild valvular abnl, moderate LVH, possible pericardial effusion. Lasix  40 qd  Hypertension so far has been normal blood pressure since arrival  Chronic A-fib status post watchman is on metoprolol continue  DVT prophylaxis: Lovenox  Full code.  Disposition: Home in 2 or 3 days    5/24/2025   Sob, fever, inc wbc: urinary antigens for strep and Legionella  (-).   Respiratory panel (-) Procalcitonin high at .32.  mycoplasma IgM pending. Supplemental oxygen not documented on 5/23 but seen by me during patient visit.  Documented on 5/24. No fever, tachy cardia, improved leukocytosis on 5/24.  Continue daily empiric rocephin and Zithromax.  CxR in am     Chf hx? 2d

## 2025-05-26 NOTE — PROGRESS NOTES
Physical Therapy Initial Evaluation/Plan of Care    Room #:  0320/0320-01  Patient Name: Deyvi Rose  YOB: 1934  MRN: [id number]    Date of Service: 5/26/2025     Tentative placement recommendation: Subacute unless patient meets goals then Home Health Physical Therapy with 24/7 assist/supervision  Equipment recommendation: Patient has needed equipment       Evaluating Physical Therapist: Aleksandr Serrano, PT #[id number]      Specific Provider Orders/Date/Referring Provider :   PT evaluation and treat  Start:  05/23/25 0400,   End:  05/23/25 0400,   ONE TIME,   Standing Count:  1 Occurrences,   R       Donell, Sharyn BRADFORD DO    Admitting Diagnosis:   Acute on chronic respiratory failure with hypoxia (HCC) [J96.21]  Altered mental status, unspecified altered mental status type [R41.82]  Sepsis, due to unspecified organism, unspecified whether acute organ dysfunction present ([facility]) [A41.9]  Concerned about care plan [Z75.8]      Surgery: none  Visit Diagnoses         Codes      Acute on chronic respiratory failure with hypoxia (HCC)    -  Primary J96.21      Sepsis, due to unspecified organism, unspecified whether acute organ dysfunction present (HCC)     A41.9      Altered mental status, unspecified altered mental status type     R41.82            Patient Active Problem List   Diagnosis    DAT (obstructive sleep apnea)    Generalized osteoarthritis    Mixed hyperlipidemia    Persistent atrial fibrillation (HCC)    Gait disorder    Benign prostatic hyperplasia without lower urinary tract symptoms    Glucose intolerance (impaired glucose tolerance)    Chronic combined systolic and diastolic congestive heart failure (HCC)    Neuropathy    Cellulitis    Presence of Watchman left atrial appendage closure device    Idiopathic chronic gout without tophus    Idiopathic peripheral neuropathy    Chronic heart failure with preserved ejection fraction (HFpEF) (HCC)    Concerned about care plan        ASSESSMENT of Current  Deficits Patient exhibits decreased strength, endurance, and range of motion impairing functional mobility, transfers, and tolerance to activity.  Benji is bedbound.  His primary method of transferring is paul lift, however he does sometimes stand and pivot.  He also sits at the edge of his hospital bed.  Benji will benefit from PT while in the hospital to work on transfers and to maintain skin integrity.        PHYSICAL THERAPY  PLAN OF CARE       Physical therapy plan of care is established based on physician order,  patient diagnosis and clinical assessment    Current Treatment Recommendations:    -Bed Mobility: Lower and upper extremity exercises, and trunk control activities  -Sitting Balance: Incorporate reaching activities to activate trunk muscles   -Transfers: Provide instruction on proper hand and foot position for adequate transfer of weight onto lower extremities and use of gait device if needed and Cues for hand placement, technique and safety. Provide stabilization to prevent fall     PT long term treatment goals are located in below grid    Patient and or family understand(s) diagnosis, prognosis, and plan of care.    Frequency of treatments: Patient will be seen  daily.       Prior Level of Function: Benji is bedbound.  His primary method of transferring is paul lift, however he does sometimes stand and pivot.  Also uses Josselyn Steady.  He also sits at the edge of his hospital bed.    Rehab Potential: good  for baseline    Past medical history:   Past Medical History:   Diagnosis Date    Anticoagulant long-term use     Arthritis     Atrial fibrillation (HCC)     Atrial fibrillation, chronic (HCC)     Cancer (HCC)     Erectile dysfunction     GERD (gastroesophageal reflux disease)     Hearing loss     HTN (hypertension)     Neuropathy     Osteoarthritis     Peripheral vascular disease     Sleep apnea      Past Surgical History:   Procedure Laterality Date    CARDIOVERSION  03/28/2023    Successful

## 2025-05-26 NOTE — CONSULTS
[address] [place] [address] [address] [place] [address]  Phone [phone]   [phone]      Admit Date: 2025  8:52 PM  Pt Name: Deyvi Rose  MRN: [id number]  : 1934  Reason for Consult:    Chief Complaint   Patient presents with    Shortness of Breath     Per EMS pt was 87% on his baseline 2-3 L O2. Duoneb administered PTA and pt now 97% on baseline O2. Pt alert and oriented x3. Hx of CHF     Requesting Physician:  Cheryl Del Rio DO  PCP: Heather Ascencio MD  History Obtained From:  patient, chart   ID consulted for Acute on chronic respiratory failure with hypoxia (HCC) [J96.21]  Altered mental status, unspecified altered mental status type [R41.82]  Sepsis, due to unspecified organism, unspecified whether acute organ dysfunction present (HCC) [A41.9]  Concerned about care plan [Z75.8]  on hospital day 0  Face to face encounter   CHIEF COMPLAINT       Chief Complaint   Patient presents with    Shortness of Breath     Per EMS pt was 87% on his baseline 2-3 L O2. Duoneb administered PTA and pt now 97% on baseline O2. Pt alert and oriented x3. Hx of CHF     HISTORYOF PRESENT ILLNESS   Deyvi Rose is a 89 y.o. male who  has a past medical history of Anticoagulant long-term use, Arthritis, Atrial fibrillation (HCC), Atrial fibrillation, chronic (HCC), Cancer (HCC), Erectile dysfunction, GERD (gastroesophageal reflux disease), Hearing loss, HTN (hypertension), Neuropathy, Osteoarthritis, Peripheral vascular disease, and Sleep apnea.   Presented to ED TRIAGE VITALS  BP: 121/81, Temp: 98.8 °F (37.1 °C), Pulse: (!) 101, Respirations: 20, SpO2: 98 %  HPI:  ID was consulted on 25 for infection management  Pt presented to ER on 2025 with diagnosis of  Acute on chronic respiratory failure with hypoxia (HCC) [J96.21]  Altered mental status, unspecified altered mental status type [R41.82]  Sepsis, due to unspecified organism, unspecified whether acute organ dysfunction present (HCC)  IV CONTRAST Additional Contrast? None  Result Date: 5/23/2025  No acute disease.  Splenomegaly.  Diverticulosis.     CTA PULMONARY W CONTRAST  Result Date: 5/23/2025  No evidence of pulmonary embolism or acute pulmonary abnormality. Cardiomegaly and small pericardial effusion.     CT HEAD WO CONTRAST  Result Date: 5/23/2025  No acute intracranial abnormality.     XR CHEST PORTABLE  Result Date: 5/22/2025  No acute disease.     XR CHEST PORTABLE  Result Date: 5/19/2025  Cardiomegaly with mild interstitial edema and trace bilateral pleural effusions.     LABS  Recent Labs     05/25/25  0414 05/26/25  0354 05/26/25 0924   WBC 10.7 9.0 8.9   HGB 12.8 13.0 13.2   HCT 40.4 40.3 42.3   MCV 88.2 88.8 89.8   PLT  --  139  --      Recent Labs     05/26/25 0924      K 3.0*      CO2 28   BUN 16   CREATININE 0.5*   LABGLOM >90   GLUCOSE 186*   CALCIUM 8.4*   BILITOT 0.5   ALKPHOS 78   AST 34   ALT 32     No results for input(s): \"PROCAL\" in the last 72 hours.  Lab Results   Component Value Date    CRP 11.0 (H) 09/14/2024     Lab Results   Component Value Date    SEDRATE 3 09/14/2024     No results found for: \"CCZFMFB2U9\"  Lab Results   Component Value Date/Time    BPARAPPCR Not Detected 05/23/2025 05:41 AM    COVID19 Not Detected 05/23/2025 05:41 AM    FLUBPCR Not Detected 05/23/2025 05:41 AM    AMZEZYU3LDM Not Detected 05/23/2025 05:41 AM    RSVPCR Not Detected 05/19/2025 01:35 PM        Lab Results   Component Value Date/Time    COVID19 Not Detected 05/23/2025 05:41 AM     COVID-19/MAHSA-COV2 LABS  Recent Labs     05/26/25 0924   AST 34   ALT 32     Lab Results   Component Value Date/Time    CHOL 141 04/17/2023 11:29 AM    TRIG 144 04/17/2023 11:29 AM    HDL 30 02/09/2024 08:12 AM     MICROBIOLOGY:  Results       Procedure Component Value Units Date/Time    Strep Pneumoniae Antigen [[id number]] Collected: 05/23/25 0945    Order Status: Completed Specimen: Urine, clean catch Updated: 05/24/25 0735     Source .CLEAN

## 2025-05-27 ENCOUNTER — APPOINTMENT (OUTPATIENT)
Dept: GENERAL RADIOLOGY | Age: 89
DRG: 871 | End: 2025-05-27
Payer: MEDICARE

## 2025-05-27 LAB
ALBUMIN SERPL-MCNC: 2.6 G/DL (ref 3.5–5.2)
ALP SERPL-CCNC: 76 U/L (ref 40–129)
ALT SERPL-CCNC: 32 U/L (ref 0–40)
ANION GAP SERPL CALCULATED.3IONS-SCNC: 10 MMOL/L (ref 7–16)
AST SERPL-CCNC: 34 U/L (ref 0–39)
BASOPHILS # BLD: 0.05 K/UL (ref 0–0.2)
BASOPHILS NFR BLD: 1 % (ref 0–2)
BILIRUB SERPL-MCNC: 0.5 MG/DL (ref 0–1.2)
BUN SERPL-MCNC: 16 MG/DL (ref 6–23)
CALCIUM SERPL-MCNC: 8.5 MG/DL (ref 8.6–10.2)
CHLORIDE SERPL-SCNC: 99 MMOL/L (ref 98–107)
CO2 SERPL-SCNC: 31 MMOL/L (ref 22–29)
CREAT SERPL-MCNC: 0.5 MG/DL (ref 0.7–1.2)
EOSINOPHIL # BLD: 0.35 K/UL (ref 0.05–0.5)
EOSINOPHILS RELATIVE PERCENT: 3 % (ref 0–6)
ERYTHROCYTE [DISTWIDTH] IN BLOOD BY AUTOMATED COUNT: 16.2 % (ref 11.5–15)
GFR, ESTIMATED: >90 ML/MIN/1.73M2
GLUCOSE SERPL-MCNC: 116 MG/DL (ref 74–99)
HCT VFR BLD AUTO: 40.3 % (ref 37–54)
HGB BLD-MCNC: 12.7 G/DL (ref 12.5–16.5)
IMM GRANULOCYTES # BLD AUTO: 0.28 K/UL (ref 0–0.58)
IMM GRANULOCYTES NFR BLD: 3 % (ref 0–5)
LYMPHOCYTES NFR BLD: 1.43 K/UL (ref 1.5–4)
LYMPHOCYTES RELATIVE PERCENT: 13 % (ref 20–42)
MAGNESIUM SERPL-MCNC: 1.9 MG/DL (ref 1.6–2.6)
MCH RBC QN AUTO: 28.2 PG (ref 26–35)
MCHC RBC AUTO-ENTMCNC: 31.5 G/DL (ref 32–34.5)
MCV RBC AUTO: 89.6 FL (ref 80–99.9)
MONOCYTES NFR BLD: 1.45 K/UL (ref 0.1–0.95)
MONOCYTES NFR BLD: 13 % (ref 2–12)
MYCOPLASMA AB,IGM: NORMAL
NEUTROPHILS NFR BLD: 67 % (ref 43–80)
NEUTS SEG NFR BLD: 7.39 K/UL (ref 1.8–7.3)
PLATELET, FLUORESCENCE: 158 K/UL (ref 130–450)
PMV BLD AUTO: 12.8 FL (ref 7–12)
POTASSIUM SERPL-SCNC: 3.9 MMOL/L (ref 3.5–5)
PROT SERPL-MCNC: 5.6 G/DL (ref 6.4–8.3)
RBC # BLD AUTO: 4.5 M/UL (ref 3.8–5.8)
SODIUM SERPL-SCNC: 140 MMOL/L (ref 132–146)
WBC OTHER # BLD: 11 K/UL (ref 4.5–11.5)

## 2025-05-27 PROCEDURE — 6360000002 HC RX W HCPCS: Performed by: FAMILY MEDICINE

## 2025-05-27 PROCEDURE — 2580000003 HC RX 258: Performed by: EMERGENCY MEDICINE

## 2025-05-27 PROCEDURE — 1200000000 HC SEMI PRIVATE

## 2025-05-27 PROCEDURE — 6360000002 HC RX W HCPCS: Performed by: INTERNAL MEDICINE

## 2025-05-27 PROCEDURE — 36415 COLL VENOUS BLD VENIPUNCTURE: CPT

## 2025-05-27 PROCEDURE — 2500000003 HC RX 250 WO HCPCS: Performed by: FAMILY MEDICINE

## 2025-05-27 PROCEDURE — 6370000000 HC RX 637 (ALT 250 FOR IP): Performed by: FAMILY MEDICINE

## 2025-05-27 PROCEDURE — 2500000003 HC RX 250 WO HCPCS: Performed by: INTERNAL MEDICINE

## 2025-05-27 PROCEDURE — 71046 X-RAY EXAM CHEST 2 VIEWS: CPT

## 2025-05-27 PROCEDURE — 99232 SBSQ HOSP IP/OBS MODERATE 35: CPT | Performed by: FAMILY MEDICINE

## 2025-05-27 PROCEDURE — 2700000000 HC OXYGEN THERAPY PER DAY

## 2025-05-27 PROCEDURE — 83735 ASSAY OF MAGNESIUM: CPT

## 2025-05-27 PROCEDURE — 85025 COMPLETE CBC W/AUTO DIFF WBC: CPT

## 2025-05-27 PROCEDURE — 6360000002 HC RX W HCPCS: Performed by: EMERGENCY MEDICINE

## 2025-05-27 PROCEDURE — 80053 COMPREHEN METABOLIC PANEL: CPT

## 2025-05-27 RX ORDER — ENOXAPARIN SODIUM 100 MG/ML
30 INJECTION SUBCUTANEOUS 2 TIMES DAILY
Status: DISCONTINUED | OUTPATIENT
Start: 2025-05-27 | End: 2025-05-29 | Stop reason: HOSPADM

## 2025-05-27 RX ORDER — MAGNESIUM SULFATE IN WATER 40 MG/ML
2000 INJECTION, SOLUTION INTRAVENOUS ONCE
Status: COMPLETED | OUTPATIENT
Start: 2025-05-27 | End: 2025-05-27

## 2025-05-27 RX ADMIN — NEOMYCIN SULFATE, POLYMYXIN B SULFATE AND DEXAMETHASONE 1 DROP: 3.5; 10000; 1 SUSPENSION/ DROPS OPHTHALMIC at 05:16

## 2025-05-27 RX ADMIN — SENNOSIDES 8.6 MG: 8.6 TABLET, FILM COATED ORAL at 21:54

## 2025-05-27 RX ADMIN — ASPIRIN 81 MG CHEWABLE TABLET 81 MG: 81 TABLET CHEWABLE at 08:36

## 2025-05-27 RX ADMIN — TAMSULOSIN HYDROCHLORIDE 0.4 MG: 0.4 CAPSULE ORAL at 21:54

## 2025-05-27 RX ADMIN — NEOMYCIN SULFATE, POLYMYXIN B SULFATE AND DEXAMETHASONE 1 DROP: 3.5; 10000; 1 SUSPENSION/ DROPS OPHTHALMIC at 00:13

## 2025-05-27 RX ADMIN — Medication 10 ML: at 21:54

## 2025-05-27 RX ADMIN — PREGABALIN 100 MG: 100 CAPSULE ORAL at 08:36

## 2025-05-27 RX ADMIN — Medication 10 ML: at 08:45

## 2025-05-27 RX ADMIN — ALLOPURINOL 100 MG: 100 TABLET ORAL at 08:37

## 2025-05-27 RX ADMIN — SORBITOL 330 ML: 13.5 SOLUTION RECTAL at 17:10

## 2025-05-27 RX ADMIN — WATER 1000 MG: 1 INJECTION INTRAMUSCULAR; INTRAVENOUS; SUBCUTANEOUS at 08:35

## 2025-05-27 RX ADMIN — FUROSEMIDE 20 MG: 10 INJECTION, SOLUTION INTRAMUSCULAR; INTRAVENOUS at 17:09

## 2025-05-27 RX ADMIN — NEOMYCIN SULFATE, POLYMYXIN B SULFATE AND DEXAMETHASONE 1 DROP: 3.5; 10000; 1 SUSPENSION/ DROPS OPHTHALMIC at 17:09

## 2025-05-27 RX ADMIN — CHOLECALCIFEROL TAB 125 MCG (5000 UNIT) 5000 UNITS: 125 TAB at 08:36

## 2025-05-27 RX ADMIN — MAGNESIUM SULFATE HEPTAHYDRATE 2000 MG: 40 INJECTION, SOLUTION INTRAVENOUS at 08:43

## 2025-05-27 RX ADMIN — SENNOSIDES 8.6 MG: 8.6 TABLET, FILM COATED ORAL at 08:36

## 2025-05-27 RX ADMIN — ENOXAPARIN SODIUM 30 MG: 100 INJECTION SUBCUTANEOUS at 21:54

## 2025-05-27 RX ADMIN — CYANOCOBALAMIN TAB 1000 MCG 250 MCG: 1000 TAB at 08:36

## 2025-05-27 RX ADMIN — CALCIUM CARBONATE 500 MG: 500 TABLET, CHEWABLE ORAL at 08:36

## 2025-05-27 RX ADMIN — AZITHROMYCIN DIHYDRATE 500 MG: 500 INJECTION, POWDER, LYOPHILIZED, FOR SOLUTION INTRAVENOUS at 04:06

## 2025-05-27 RX ADMIN — NEOMYCIN SULFATE, POLYMYXIN B SULFATE AND DEXAMETHASONE 1 DROP: 3.5; 10000; 1 SUSPENSION/ DROPS OPHTHALMIC at 12:48

## 2025-05-27 RX ADMIN — PREGABALIN 100 MG: 100 CAPSULE ORAL at 21:54

## 2025-05-27 RX ADMIN — Medication 1 TABLET: at 17:09

## 2025-05-27 RX ADMIN — ENOXAPARIN SODIUM 40 MG: 100 INJECTION SUBCUTANEOUS at 08:35

## 2025-05-27 RX ADMIN — FUROSEMIDE 20 MG: 10 INJECTION, SOLUTION INTRAMUSCULAR; INTRAVENOUS at 08:36

## 2025-05-27 RX ADMIN — NEOMYCIN SULFATE, POLYMYXIN B SULFATE AND DEXAMETHASONE 1 DROP: 3.5; 10000; 1 SUSPENSION/ DROPS OPHTHALMIC at 22:58

## 2025-05-27 RX ADMIN — METOPROLOL SUCCINATE 25 MG: 25 TABLET, FILM COATED, EXTENDED RELEASE ORAL at 08:36

## 2025-05-27 ASSESSMENT — PAIN SCALES - GENERAL: PAINLEVEL_OUTOF10: 0

## 2025-05-27 NOTE — PROGRESS NOTES
This patient is on DVT Prophylaxis medication that requires a dose adjustment      Date Body Weight IBW  Adjusted BW SCr  CrCl Dialysis status   5/27/2025 117.4 kg (258 lb 14.4 oz) Ideal body weight: 66.1 kg (145 lb 11.6 oz)  Adjusted ideal body weight: 86.6 kg (190 lb 15.9 oz) Serum creatinine: 0.5 mg/dL (L) 05/27/25 0440  Estimated creatinine clearance: 118 mL/min (A) N/a       Pharmacy has dose-adjusted the DVT Prophylaxis regimen to match   the recommendations from the following table        Ordered Medication:Lovenox 40mg daily    Order Changed/converted to: Lovenox 30mg BID    These changes were made per protocol according to the [facility] Pharmacist   Review for Appropriate Use and Automatic Dose Adjustments of   Subcutaneous Anticoagulants Policy     *Please note this dose may need readjusted if patient's condition changes.    Please contact pharmacy with any questions regarding these changes.    Thank you,       Ivette Davison [facility]  5/27/2025   12:40 PM    SJW: [phone]

## 2025-05-27 NOTE — PROGRESS NOTES
Physician Progress Note      PATIENT:               ANDREA PRESCOTT  CSN #:                  [id number]  :                       1934  ADMIT DATE:       2025 8:52 PM  DISCH DATE:  RESPONDING  PROVIDER #:        Cheryl Del Rio DO          QUERY TEXT:    Based on your medical judgment, please clarify these findings and document if   any of the following are being evaluated and/or treated:    The clinical indicators include:  -89 year old male with hx of htn, chf, and afib (H/P  Dr. Marley)  [facility]  Options provided:  -- Secondary hypercoagulable state in a patient with atrial fibrillation  -- Other - I will add my own diagnosis  -- Disagree - Not applicable / Not valid  -- Disagree - Clinically unable to determine / Unknown  -- Refer to Clinical Documentation Reviewer    PROVIDER RESPONSE TEXT:    This patient has secondary hypercoagulable state in a patient with atrial   fibrillation.    Query created by: Marlon Ortega on 2025 1:02 PM      Electronically signed by:  Cheryl Del Rio DO 2025 1:46 PM

## 2025-05-27 NOTE — CARE COORDINATION
5/27/2025 300p (sf) SS NOTE: Therapy evaluated pt and recommended MAHSA (ampac of 6). SW met with pt's wife at bedside to discuss recommendations. Wife is interested in pt going to [facility]. Referral placed in [facility]-will await decision. Wife states if [facility] is not able to accept, then plan will be home and will resume HHC thru [place]. Wife also states pt has HHA's thru [facility] ([facility]) that comes out 6 days a week for 5 hrs a day. Will await decision from [place]. If accepted, pt will NEED Precert, hens, richard & 7000 completed. If pt should dc home, will need SADAF orders placed.    Electronically signed by FELICIA Ruiz on 5/27/2025 at 3:04 PM                                The Plan for Transition of Care is related to the following treatment goals: MAHSA    The Patient and/or patient representative wife was provided with a choice of provider and agrees   with the discharge plan. [x] Yes [] No    Freedom of choice list was provided with basic dialogue that supports the patient's individualized plan of care/goals, treatment preferences and shares the quality data associated with the providers. [x] Yes [] No

## 2025-05-27 NOTE — PROGRESS NOTES
[facility] Hospitalist Progress Note    Admitting Date and Time: 5/22/2025  8:52 PM  Admit Dx: Acute on chronic respiratory failure with hypoxia ([facility]) [J96.21]  Altered mental status, unspecified altered mental status type [R41.82]  Sepsis, due to unspecified organism, unspecified whether acute organ dysfunction present ([facility]) [A41.9]  Concerned about care plan [Z75.8]  Acute on chronic diastolic CHF (congestive heart failure) ([facility]) [I50.33]      Assessment:    Principal Problem:    Acute on chronic diastolic CHF (congestive heart failure) ([facility])  Active Problems:    Presence of Watchman left atrial appendage closure device    Persistent atrial fibrillation ([facility])    Chronic heart failure with preserved ejection fraction (HFpEF) ([facility])    Concerned about care plan  Resolved Problems:    * No resolved hospital problems. *      Plan:  5/23/2025  Fever  Leukocytosis  SOB  - prn duonebs  - f/u respiratory panel  - admitted in March with RSV and mycoplasma pneumonia  - no pneumonia on imaging, will observe off antibiotics     CHF  - lasix 40mg daily  - intake/output  - daily weight     HTN  - normotensive in ED  - trend BP     Chronic AFib  S/p watchman device  - rate control with metoprolol     Sob, fever, inc wbc: Procalcitonin, Respiratory panel mycoplasma IgM urinary antigens for strep and Legionella procalcitonin high at .32.  Supplemental oxygen not documented?     Chf hx? 2d echo 7/24 ef 60% mild valvular abnl, moderate LVH, possible pericardial effusion. Lasix  40 qd  Hypertension so far has been normal blood pressure since arrival  Chronic A-fib status post watchman is on metoprolol continue  DVT prophylaxis: Lovenox  Full code.  Disposition: Home in 2 or 3 days    5/24/2025   Sob, fever, inc wbc: urinary antigens for strep and Legionella  (-).   Respiratory panel (-) Procalcitonin high at .32.  mycoplasma IgM pending. Supplemental oxygen not documented on 5/23 but seen by me during patient visit.

## 2025-05-27 NOTE — PLAN OF CARE
Problem: Safety - Adult  Goal: Free from fall injury  5/27/2025 0938 by Olivia Talbert RN  Outcome: Progressing  5/27/2025 0522 by Eliecer Cervantes RN  Outcome: Progressing     Problem: Chronic Conditions and Co-morbidities  Goal: Patient's chronic conditions and co-morbidity symptoms are monitored and maintained or improved  5/27/2025 0938 by Olivia Talbert RN  Outcome: Progressing  5/27/2025 0522 by Eliecer Cervantes RN  Outcome: Progressing     Problem: Discharge Planning  Goal: Discharge to home or other facility with appropriate resources  5/27/2025 0938 by Olivia Talbert RN  Outcome: Progressing  5/27/2025 0522 by Eliecer Cervantes RN  Outcome: Progressing     Problem: Pain  Goal: Verbalizes/displays adequate comfort level or baseline comfort level  5/27/2025 0938 by Olivia Talbert RN  Outcome: Progressing  5/27/2025 0522 by Eliecer Cervantes RN  Outcome: Progressing     Problem: Skin/Tissue Integrity  Goal: Skin integrity remains intact  Description: 1.  Monitor for areas of redness and/or skin breakdown2.  Assess vascular access sites hourly3.  Every 4-6 hours minimum:  Change oxygen saturation probe site4.  Every 4-6 hours:  If on nasal continuous positive airway pressure, respiratory therapy assess nares and determine need for appliance change or resting period  5/27/2025 0938 by Olivia Talbert RN  Outcome: Progressing  5/27/2025 0522 by Eliecer Cervantes RN  Outcome: Progressing     Problem: ABCDS Injury Assessment  Goal: Absence of physical injury  5/27/2025 0938 by Olivia Talbert RN  Outcome: Progressing  5/27/2025 0522 by Eliecer Cervantes RN  Outcome: Progressing     Problem: Neurosensory - Adult  Goal: Achieves maximal functionality and self care  5/27/2025 0938 by Olivia Talbert RN  Outcome: Progressing  5/27/2025 0522 by Eliecer Cervantes RN  Outcome: Progressing     Problem: Respiratory - Adult  Goal: Achieves optimal ventilation and oxygenation  5/27/2025 0938 by Olivia Talbert RN  Outcome: Progressing  5/27/2025

## 2025-05-27 NOTE — PROGRESS NOTES
[facility]  [facility]  Progress Note    Chief complain:  pneumonia    SUBJECTIVE:    Patient is awake, alert , tolerating antibiotics well     ROS:  Constitutional:  denies fever , chills   Cardiovascular: denies chest pain or palpitation   Gastrointestinal: . Denies abdominal pain, diarrhea, no nausea or vomiting  Genitourinary:      Denies  dysuria or burning micturition  Musculoskeletal: no aches or pain   Allergic/Immunologic:  No rash or itching     OBJECTIVE:    Vitals:    05/26/25 1806 05/27/25 0535 05/27/25 0600 05/27/25 0800   BP: 122/81 128/82     Pulse: 100 (!) 102     Resp: 20      Temp: 98.6 °F (37 °C) 98.2 °F (36.8 °C)     TempSrc: Oral      SpO2: 96% 96%  95%   Weight:   117.4 kg (258 lb 14.4 oz)    Height:           HEENT :         unremarkable   Heart:             RRR,  No murmurs, no gallops  Lungs:            clear to auscultation, no wheezing , no rales  Abdomen:       soft, non tender, bowel sounds present  Extremites:     No edema, no ulcers,  Skin:                Normal turgor,normal texture  Lines :             peripheral              Pizarro catheter :   absent  Wound : none present                        Current Facility-Administered Medications   Medication Dose Route Frequency Provider Last Rate Last Admin    enoxaparin Sodium (LOVENOX) injection 30 mg  30 mg SubCUTAneous BID Sharyn Marley,         furosemide (LASIX) injection 20 mg  20 mg IntraVENous BID Cheryl Del Rio DO   20 mg at 05/27/25 0836    neomycin-polymyxin-dexameth (MAXITROL) 3.5-74530-6.1 ophthalmic suspension 1 drop  1 drop Both Eyes 4 times per day Cheryl Del Rio DO   1 drop at 05/27/25 1248    senna (SENOKOT) tablet 8.6 mg  1 tablet Oral BID Cheryl Del Rio DO   8.6 mg at 05/27/25 0836    Polyvinyl Alcohol-Povidone PF (REFRESH) 1.4-0.6 % ophthalmic solution 2 drop  2 drop Both Eyes PRN Eliecer White MD   2 drop at 05/25/25 1937    pregabalin (LYRICA) capsule 100

## 2025-05-27 NOTE — PROGRESS NOTES
Spiritual Health History and Assessment/Progress Note  [facility]    (P) Initial Encounter,  ,  ,      Name: Deyvi Rose MRN: [id number]    Age: 89 y.o.     Sex: male   Language: English   Pentecostalism: Scientology   Acute on chronic diastolic CHF (congestive heart failure) (HCC)     Date: 5/27/2025                           Spiritual Assessment began in [facility] MED SURG        Referral/Consult From: (P) Rounding   Encounter Overview/Reason: (P) Initial Encounter  Service Provided For: (P) Patient    Sommer, Belief, Meaning:   Patient is connected with a sommer tradition or spiritual practice  Family/Friends No family/friends present      Importance and Influence:  Patient has spiritual/personal beliefs that influence decisions regarding their health  Family/Friends No family/friends present    Community:  Patient feels well-supported. Support system includes: Spouse/Partner, Children, and Extended family  Family/Friends No family/friends present    Assessment and Plan of Care:     Patient Interventions include: Facilitated expression of thoughts and feelings and Affirmed coping skills/support systems  Family/Friends Interventions include: No family/friends present    Patient Plan of Care: Spiritual Care available upon further referral  Family/Friends Plan of Care: No family/friends present    Electronically signed by Chaplain Manuela on 5/27/2025 at 12:25 PM

## 2025-05-28 LAB
ALBUMIN SERPL-MCNC: 2.7 G/DL (ref 3.5–5.2)
ALP SERPL-CCNC: 79 U/L (ref 40–129)
ALT SERPL-CCNC: 29 U/L (ref 0–40)
ANION GAP SERPL CALCULATED.3IONS-SCNC: 8 MMOL/L (ref 7–16)
AST SERPL-CCNC: 31 U/L (ref 0–39)
BASOPHILS # BLD: 0.06 K/UL (ref 0–0.2)
BASOPHILS NFR BLD: 1 % (ref 0–2)
BILIRUB SERPL-MCNC: 0.6 MG/DL (ref 0–1.2)
BUN SERPL-MCNC: 17 MG/DL (ref 6–23)
CALCIUM SERPL-MCNC: 9 MG/DL (ref 8.6–10.2)
CHLORIDE SERPL-SCNC: 99 MMOL/L (ref 98–107)
CO2 SERPL-SCNC: 34 MMOL/L (ref 22–29)
CREAT SERPL-MCNC: 0.5 MG/DL (ref 0.7–1.2)
EOSINOPHIL # BLD: 0.26 K/UL (ref 0.05–0.5)
EOSINOPHILS RELATIVE PERCENT: 2 % (ref 0–6)
ERYTHROCYTE [DISTWIDTH] IN BLOOD BY AUTOMATED COUNT: 16.1 % (ref 11.5–15)
GFR, ESTIMATED: >90 ML/MIN/1.73M2
GLUCOSE SERPL-MCNC: 116 MG/DL (ref 74–99)
HCT VFR BLD AUTO: 41.3 % (ref 37–54)
HGB BLD-MCNC: 12.7 G/DL (ref 12.5–16.5)
IMM GRANULOCYTES # BLD AUTO: 0.23 K/UL (ref 0–0.58)
IMM GRANULOCYTES NFR BLD: 2 % (ref 0–5)
LYMPHOCYTES NFR BLD: 1.34 K/UL (ref 1.5–4)
LYMPHOCYTES RELATIVE PERCENT: 12 % (ref 20–42)
MAGNESIUM SERPL-MCNC: 2.2 MG/DL (ref 1.6–2.6)
MCH RBC QN AUTO: 27.7 PG (ref 26–35)
MCHC RBC AUTO-ENTMCNC: 30.8 G/DL (ref 32–34.5)
MCV RBC AUTO: 90 FL (ref 80–99.9)
MICROORGANISM SPEC CULT: NORMAL
MICROORGANISM SPEC CULT: NORMAL
MONOCYTES NFR BLD: 1.33 K/UL (ref 0.1–0.95)
MONOCYTES NFR BLD: 12 % (ref 2–12)
NEUTROPHILS NFR BLD: 71 % (ref 43–80)
NEUTS SEG NFR BLD: 7.78 K/UL (ref 1.8–7.3)
PLATELET, FLUORESCENCE: 167 K/UL (ref 130–450)
PMV BLD AUTO: 13.4 FL (ref 7–12)
POTASSIUM SERPL-SCNC: 3.7 MMOL/L (ref 3.5–5)
PROT SERPL-MCNC: 5.7 G/DL (ref 6.4–8.3)
RBC # BLD AUTO: 4.59 M/UL (ref 3.8–5.8)
SERVICE CMNT-IMP: NORMAL
SERVICE CMNT-IMP: NORMAL
SODIUM SERPL-SCNC: 141 MMOL/L (ref 132–146)
SPECIMEN DESCRIPTION: NORMAL
SPECIMEN DESCRIPTION: NORMAL
WBC OTHER # BLD: 11 K/UL (ref 4.5–11.5)

## 2025-05-28 PROCEDURE — 6370000000 HC RX 637 (ALT 250 FOR IP): Performed by: FAMILY MEDICINE

## 2025-05-28 PROCEDURE — 36415 COLL VENOUS BLD VENIPUNCTURE: CPT

## 2025-05-28 PROCEDURE — 97110 THERAPEUTIC EXERCISES: CPT

## 2025-05-28 PROCEDURE — 6360000002 HC RX W HCPCS: Performed by: FAMILY MEDICINE

## 2025-05-28 PROCEDURE — 99232 SBSQ HOSP IP/OBS MODERATE 35: CPT | Performed by: FAMILY MEDICINE

## 2025-05-28 PROCEDURE — 85025 COMPLETE CBC W/AUTO DIFF WBC: CPT

## 2025-05-28 PROCEDURE — 6360000002 HC RX W HCPCS: Performed by: INTERNAL MEDICINE

## 2025-05-28 PROCEDURE — 97530 THERAPEUTIC ACTIVITIES: CPT

## 2025-05-28 PROCEDURE — 83735 ASSAY OF MAGNESIUM: CPT

## 2025-05-28 PROCEDURE — 6360000002 HC RX W HCPCS: Performed by: EMERGENCY MEDICINE

## 2025-05-28 PROCEDURE — 5A09357 ASSISTANCE WITH RESPIRATORY VENTILATION, LESS THAN 24 CONSECUTIVE HOURS, CONTINUOUS POSITIVE AIRWAY PRESSURE: ICD-10-PCS | Performed by: FAMILY MEDICINE

## 2025-05-28 PROCEDURE — 6370000000 HC RX 637 (ALT 250 FOR IP): Performed by: INTERNAL MEDICINE

## 2025-05-28 PROCEDURE — 97535 SELF CARE MNGMENT TRAINING: CPT

## 2025-05-28 PROCEDURE — 1200000000 HC SEMI PRIVATE

## 2025-05-28 PROCEDURE — 2580000003 HC RX 258: Performed by: EMERGENCY MEDICINE

## 2025-05-28 PROCEDURE — 80053 COMPREHEN METABOLIC PANEL: CPT

## 2025-05-28 PROCEDURE — 2500000003 HC RX 250 WO HCPCS: Performed by: INTERNAL MEDICINE

## 2025-05-28 PROCEDURE — 2700000000 HC OXYGEN THERAPY PER DAY

## 2025-05-28 PROCEDURE — 2500000003 HC RX 250 WO HCPCS: Performed by: FAMILY MEDICINE

## 2025-05-28 RX ORDER — NEOMYCIN SULFATE, POLYMYXIN B SULFATE AND DEXAMETHASONE 3.5; 10000; 1 MG/ML; [USP'U]/ML; MG/ML
1 SUSPENSION/ DROPS OPHTHALMIC 2 TIMES DAILY
DISCHARGE
Start: 2025-05-28 | End: 2025-05-29

## 2025-05-28 RX ORDER — PREGABALIN 100 MG/1
100 CAPSULE ORAL 2 TIMES DAILY
Qty: 60 CAPSULE | Refills: 0 | Status: SHIPPED | OUTPATIENT
Start: 2025-05-28 | End: 2025-05-29

## 2025-05-28 RX ORDER — SENNOSIDES 8.6 MG/1
1 TABLET ORAL 2 TIMES DAILY
DISCHARGE
Start: 2025-05-28 | End: 2025-05-29 | Stop reason: HOSPADM

## 2025-05-28 RX ADMIN — SENNOSIDES 8.6 MG: 8.6 TABLET, FILM COATED ORAL at 20:51

## 2025-05-28 RX ADMIN — WATER 1000 MG: 1 INJECTION INTRAMUSCULAR; INTRAVENOUS; SUBCUTANEOUS at 08:38

## 2025-05-28 RX ADMIN — Medication 10 ML: at 08:14

## 2025-05-28 RX ADMIN — PREGABALIN 100 MG: 100 CAPSULE ORAL at 20:51

## 2025-05-28 RX ADMIN — ENOXAPARIN SODIUM 30 MG: 100 INJECTION SUBCUTANEOUS at 20:51

## 2025-05-28 RX ADMIN — NEOMYCIN SULFATE, POLYMYXIN B SULFATE AND DEXAMETHASONE 1 DROP: 3.5; 10000; 1 SUSPENSION/ DROPS OPHTHALMIC at 04:24

## 2025-05-28 RX ADMIN — FUROSEMIDE 20 MG: 10 INJECTION, SOLUTION INTRAMUSCULAR; INTRAVENOUS at 08:09

## 2025-05-28 RX ADMIN — PREGABALIN 100 MG: 100 CAPSULE ORAL at 08:09

## 2025-05-28 RX ADMIN — ASPIRIN 81 MG CHEWABLE TABLET 81 MG: 81 TABLET CHEWABLE at 08:09

## 2025-05-28 RX ADMIN — SENNOSIDES 8.6 MG: 8.6 TABLET, FILM COATED ORAL at 08:09

## 2025-05-28 RX ADMIN — METOPROLOL SUCCINATE 25 MG: 25 TABLET, FILM COATED, EXTENDED RELEASE ORAL at 08:09

## 2025-05-28 RX ADMIN — AZITHROMYCIN DIHYDRATE 500 MG: 500 INJECTION, POWDER, LYOPHILIZED, FOR SOLUTION INTRAVENOUS at 04:20

## 2025-05-28 RX ADMIN — NEOMYCIN SULFATE, POLYMYXIN B SULFATE AND DEXAMETHASONE 1 DROP: 3.5; 10000; 1 SUSPENSION/ DROPS OPHTHALMIC at 18:42

## 2025-05-28 RX ADMIN — FUROSEMIDE 20 MG: 10 INJECTION, SOLUTION INTRAMUSCULAR; INTRAVENOUS at 17:43

## 2025-05-28 RX ADMIN — CHOLECALCIFEROL TAB 125 MCG (5000 UNIT) 5000 UNITS: 125 TAB at 08:09

## 2025-05-28 RX ADMIN — ENOXAPARIN SODIUM 30 MG: 100 INJECTION SUBCUTANEOUS at 08:08

## 2025-05-28 RX ADMIN — CYANOCOBALAMIN TAB 1000 MCG 250 MCG: 1000 TAB at 08:09

## 2025-05-28 RX ADMIN — NEOMYCIN SULFATE, POLYMYXIN B SULFATE AND DEXAMETHASONE 1 DROP: 3.5; 10000; 1 SUSPENSION/ DROPS OPHTHALMIC at 13:07

## 2025-05-28 RX ADMIN — POLYVINYL ALCOHOL, POVIDONE 2 DROP: 14; 6 SOLUTION/ DROPS OPHTHALMIC at 08:09

## 2025-05-28 RX ADMIN — NEOMYCIN SULFATE, POLYMYXIN B SULFATE AND DEXAMETHASONE 1 DROP: 3.5; 10000; 1 SUSPENSION/ DROPS OPHTHALMIC at 23:16

## 2025-05-28 RX ADMIN — ALLOPURINOL 100 MG: 100 TABLET ORAL at 08:09

## 2025-05-28 RX ADMIN — TAMSULOSIN HYDROCHLORIDE 0.4 MG: 0.4 CAPSULE ORAL at 20:50

## 2025-05-28 RX ADMIN — Medication 10 ML: at 20:51

## 2025-05-28 RX ADMIN — CALCIUM CARBONATE 500 MG: 500 TABLET, CHEWABLE ORAL at 08:09

## 2025-05-28 NOTE — CARE COORDINATION
5/28/2025: SS NOTE:  Notified by [facility] of denial, unable to accept pt on their short term rehab due to staffing and do not have a ltc bed available, met with pt and pt's wife to confirm d/c plan, Mrs Rose does not feel she manage his care needs now at home and reviewed SNF provider list printed through [facility] and [facility], Nursing informed. Electronically signed by FELICIA Robb on 5/28/2025 at 1:25 PM

## 2025-05-28 NOTE — PROGRESS NOTES
[facility]  [facility]  Progress Note    Chief complain:  pneumonia    SUBJECTIVE:    Patient is awake, alert , tolerating antibiotics well , patient at present on BiPAP    ROS:  Constitutional:  denies fever , chills   Cardiovascular: denies chest pain or palpitation   Gastrointestinal: . Denies abdominal pain, diarrhea, no nausea or vomiting  Genitourinary:      Denies  dysuria or burning micturition  Musculoskeletal: no aches or pain   Allergic/Immunologic:  No rash or itching     OBJECTIVE:    Vitals:    05/28/25 0446 05/28/25 0809 05/28/25 0830 05/28/25 1315   BP: 112/66 109/72     Pulse: (!) 107 93     Resp: 20      Temp: 97.7 °F (36.5 °C)      TempSrc: Oral      SpO2: 95%  96% 95%   Weight:       Height:           HEENT :         unremarkable   Heart:             RRR,  No murmurs, no gallops  Lungs:            clear to auscultation, no wheezing , no rales  Abdomen:       soft, non tender, bowel sounds present  Extremites:     No edema, no ulcers,  Skin:                Normal turgor,normal texture  Lines :             peripheral              Pizarro catheter :   absent  Wound : none present                        Current Facility-Administered Medications   Medication Dose Route Frequency Provider Last Rate Last Admin    enoxaparin Sodium (LOVENOX) injection 30 mg  30 mg SubCUTAneous BID Sharyn Marley DO   30 mg at 05/28/25 0808    furosemide (LASIX) injection 20 mg  20 mg IntraVENous BID Cheryl Del Rio DO   20 mg at 05/28/25 0809    neomycin-polymyxin-dexameth (MAXITROL) 3.5-43453-4.1 ophthalmic suspension 1 drop  1 drop Both Eyes 4 times per day Cheryl Del Rio DO   1 drop at 05/28/25 1307    senna (SENOKOT) tablet 8.6 mg  1 tablet Oral BID Cheryl Del Rio DO   8.6 mg at 05/28/25 0809    Polyvinyl Alcohol-Povidone PF (REFRESH) 1.4-0.6 % ophthalmic solution 2 drop  2 drop Both Eyes PRN Eliecer White MD   2 drop at 05/28/25 0809    pregabalin (LYRICA)

## 2025-05-28 NOTE — DISCHARGE INSTR - COC
Continuity of Care Form    Patient Name: Deyvi Rose   :  1934  MRN:  [id number]    Admit date:  2025  Discharge date:  ***    Code Status Order: Full Code   Advance Directives:     Admitting Physician:  Cheryl Del Rio DO  PCP: Heather Ascencio MD    Discharging Nurse: ***  Discharging Hospital Unit/Room#: 0321/0321-02  Discharging Unit Phone Number: ***    Emergency Contact:   Extended Emergency Contact Information  Primary Emergency Contact: Kenyetta Rose  Address: [address] [place] [address]  Home Phone: [phone]  Mobile Phone: [phone]  Relation: Spouse  Secondary Emergency Contact: Myra Langley  Home Phone: [phone]  Mobile Phone: [phone]  Relation: Child    Past Surgical History:  Past Surgical History:   Procedure Laterality Date    CARDIOVERSION  2023    Successful   Dr Laughlin    CARDIOVERSION  2023    Successful  Dr. Laughlin    CARDIOVERSION  2023    Successful  Dr Laughlin    CARPAL TUNNEL RELEASE Right 2017    CARPAL TUNNEL RELEASE Left 2017    COLECTOMY      for polyps    COLONOSCOPY      ELBOW SURGERY      tendon reattachment    EP DEVICE PROCEDURE N/A 2024    Watchman nael closure device performed by Dequan Bates MD at [facility]    EYE SURGERY Bilateral     cataracts    FOOT SURGERY Right 2018    fusion 1st mpj right foot    CT OSTEOT W/WO LNGTH SHRT/CORRJ 1ST METAR Right 2018    FUSION 1ST MPJ RIGHT FOOT performed by Dago Garcia Jr. DPDANIEL at [facility]         Immunization History:   Immunization History   Administered Date(s) Administered    COVID-19, PFIZER PURPLE top, DILUTE for use, (age 12 y+), 30mcg/0.3mL 2021, 2021, 10/22/2021    COVID-19, [place] Vaccine, Vaccine Unspecified 2021, 2021    Influenza Virus Vaccine 2014, 10/07/2014    Influenza, FLUAD, (age 65 y+), IM, Quadv, 0.5mL 2020, 10/05/2021, 2023    Influenza, FLUAD,

## 2025-05-28 NOTE — PROGRESS NOTES
[facility] Hospitalist Progress Note    Admitting Date and Time: 5/22/2025  8:52 PM  Admit Dx: Acute on chronic respiratory failure with hypoxia ([facility]) [J96.21]  Altered mental status, unspecified altered mental status type [R41.82]  Sepsis, due to unspecified organism, unspecified whether acute organ dysfunction present ([facility]) [A41.9]  Concerned about care plan [Z75.8]  Acute on chronic diastolic CHF (congestive heart failure) ([facility]) [I50.33]      Assessment:    Principal Problem:    Acute on chronic diastolic CHF (congestive heart failure) ([facility])  Active Problems:    Presence of Watchman left atrial appendage closure device    Persistent atrial fibrillation ([facility])    Chronic heart failure with preserved ejection fraction (HFpEF) ([facility])    Concerned about care plan  Resolved Problems:    * No resolved hospital problems. *      Plan:  5/23/2025  Fever  Leukocytosis  SOB  - prn duonebs  - f/u respiratory panel  - admitted in March with RSV and mycoplasma pneumonia  - no pneumonia on imaging, will observe off antibiotics     CHF  - lasix 40mg daily  - intake/output  - daily weight     HTN  - normotensive in ED  - trend BP     Chronic AFib  S/p watchman device  - rate control with metoprolol     Sob, fever, inc wbc: Procalcitonin, Respiratory panel mycoplasma IgM urinary antigens for strep and Legionella procalcitonin high at .32.  Supplemental oxygen not documented?     Chf hx? 2d echo 7/24 ef 60% mild valvular abnl, moderate LVH, possible pericardial effusion. Lasix  40 qd  Hypertension so far has been normal blood pressure since arrival  Chronic A-fib status post watchman is on metoprolol continue  DVT prophylaxis: Lovenox  Full code.  Disposition: Home in 2 or 3 days    5/24/2025   Sob, fever, inc wbc: urinary antigens for strep and Legionella  (-).   Respiratory panel (-) Procalcitonin high at .32.  mycoplasma IgM pending. Supplemental oxygen not documented on 5/23 but seen by me during patient visit.   Documented on 5/24. No fever, tachy cardia, improved leukocytosis on 5/24.  Continue daily empiric rocephin and Zithromax.  CxR in am     Chf hx? 2d echo 7/24 ef 60% mild valvular abnl, moderate LVH, possible pericardial effusion. Lasix  40 qd  Metabolic encephalopathy wife at bedside says confusion improving. It began on 5/22  Hypertension so far has been normal blood pressure since arrival  Chronic A-fib status post watchman is on metoprolol continue  DVT prophylaxis: Lovenox  Full code.  Disposition: Home in 2 or 3 days      5/25/2025  Atypical pneumonia:  Sob, fever, inc wbc: urinary antigens for strep and Legionella  (-).   Respiratory panel (-) Procalcitonin high at .32.  mycoplasma IgM pending. Supplemental oxygen not documented on 5/23 but seen by me during patient visit.  Documented on 5/24. No fever, tachy cardia, improved leukocytosis on 5/24.  Continue daily empiric rocephin and Zithromax.  CxR 5/25:  Cardiomegaly with chronic interstitial changes seen throughout the lung fields bilaterally with no new focal parenchymal opacification present.     I went back to explain to the patient that his chest x-ray did not show the type of pneumonia with a big spot of infection.  I said that this is pattern of atypical pneumonia everted on the board and we will will see with time if he is not able to wife as a test  430 pm update.  Rn says family including wife here wants to talk.  Before going to see them I spoke on phone to dtr martín.  He did not explain any of the testing and diagnosis we was able to speak back to me this am.  I had left \"atypical pneumonia\" on the white board and showed him to aid his memory\"  Martín said they were looking at his chart and google and thus have more questions     Despite recent med care and hospitalization in march for rsv I do not think this is nosocomial since he is improving on current regimen.    Chf hx? 2d echo 7/24 ef 60% mild valvular abnl, moderate LVH, possible

## 2025-05-28 NOTE — CARE COORDINATION
5/28/2025: SS NOTE:  Notified by Gisselle, liaison for [facility] of no beds available, pt and pt's wife notified, [facility], referral made through [facility], awaiting review and acceptance, unsure of next facility preference but will review list if needed, Nursing informed. Electronically signed by FELICIA Robb on 5/28/2025 at 2:25 PM

## 2025-05-28 NOTE — PLAN OF CARE
Problem: Safety - Adult  Goal: Free from fall injury  Outcome: Progressing     Problem: Chronic Conditions and Co-morbidities  Goal: Patient's chronic conditions and co-morbidity symptoms are monitored and maintained or improved  Outcome: Progressing     Problem: Discharge Planning  Goal: Discharge to home or other facility with appropriate resources  Outcome: Progressing     Problem: Pain  Goal: Verbalizes/displays adequate comfort level or baseline comfort level  Outcome: Progressing     Problem: ABCDS Injury Assessment  Goal: Absence of physical injury  Outcome: Progressing     Problem: Neurosensory - Adult  Goal: Achieves maximal functionality and self care  Outcome: Progressing     Problem: Respiratory - Adult  Goal: Achieves optimal ventilation and oxygenation  Outcome: Progressing     Problem: Skin/Tissue Integrity - Adult  Goal: Skin integrity remains intact  Description: 1.  Monitor for areas of redness and/or skin breakdown2.  Assess vascular access sites hourly3.  Every 4-6 hours minimum:  Change oxygen saturation probe site4.  Every 4-6 hours:  If on nasal continuous positive airway pressure, respiratory therapy assess nares and determine need for appliance change or resting period  Outcome: Progressing     Problem: Musculoskeletal - Adult  Goal: Return ADL status to a safe level of function  Outcome: Progressing

## 2025-05-28 NOTE — PROGRESS NOTES
Physical Therapy Treatment Note/Plan of Care    Room #:  0321/0321-02  Patient Name: Deyvi Rose  YOB: 1934  MRN: [id number]    Date of Service: 5/28/2025     Tentative placement recommendation: Subacute unless patient meets goals then Home Health Physical Therapy with 24/7 assist/supervision  Equipment recommendation: Patient has needed equipment       Evaluating Physical Therapist: Aleksandr Serrano, PT #[id number]      Specific Provider Orders/Date/Referring Provider :   PT evaluation and treat  Start:  05/23/25 0400,   End:  05/23/25 0400,   ONE TIME,   Standing Count:  1 Occurrences,   R       Sharyn Marley DO    Admitting Diagnosis:   Acute on chronic respiratory failure with hypoxia ([facility]) [J96.21]  Altered mental status, unspecified altered mental status type [R41.82]  Sepsis, due to unspecified organism, unspecified whether acute organ dysfunction present ([facility]) [A41.9]  Concerned about care plan [Z75.8]  Acute on chronic diastolic CHF (congestive heart failure) ([facility]) [I50.33]      Surgery: none  Visit Diagnoses         Codes      Acute on chronic respiratory failure with hypoxia (HCC)    -  Primary J96.21      Sepsis, due to unspecified organism, unspecified whether acute organ dysfunction present ([facility])     A41.9      Altered mental status, unspecified altered mental status type     R41.82            Patient Active Problem List   Diagnosis    DAT (obstructive sleep apnea)    Generalized osteoarthritis    Mixed hyperlipidemia    Persistent atrial fibrillation (HCC)    Gait disorder    Benign prostatic hyperplasia without lower urinary tract symptoms    Glucose intolerance (impaired glucose tolerance)    Chronic combined systolic and diastolic congestive heart failure (HCC)    Neuropathy    Cellulitis    Presence of Watchman left atrial appendage closure device    Idiopathic chronic gout without tophus    Idiopathic peripheral neuropathy    Chronic heart failure with preserved ejection fraction  (HFpEF) ([facility])    Concerned about care plan    Acute on chronic diastolic CHF (congestive heart failure) ([facility])        ASSESSMENT of Current Deficits Patient exhibits decreased strength, endurance, and range of motion impairing functional mobility, transfers, and tolerance to activity. Patient assisted with all supine LE exercises, able to move LLE better than RLE. Patient needing assist x2 for bed mobility. Patient attempted standing in the tonia stedy today, however unable to pull himself upright, even with max assist x2 and bed elevated. Patient fatigued by end of session and required rest breaks throughout due to shortness of breath on 1L. Patient would benefit from further therapy to address above deficits in order to increase mobility and decrease risk of falls.       PHYSICAL THERAPY  PLAN OF CARE       Physical therapy plan of care is established based on physician order,  patient diagnosis and clinical assessment    Current Treatment Recommendations:    -Bed Mobility: Lower and upper extremity exercises, and trunk control activities  -Sitting Balance: Incorporate reaching activities to activate trunk muscles   -Transfers: Provide instruction on proper hand and foot position for adequate transfer of weight onto lower extremities and use of gait device if needed and Cues for hand placement, technique and safety. Provide stabilization to prevent fall     PT long term treatment goals are located in below grid    Patient and or family understand(s) diagnosis, prognosis, and plan of care.    Frequency of treatments: Patient will be seen  daily.       Prior Level of Function: Benji is bedbound.  His primary method of transferring is paul lift, however he does sometimes stand and pivot.  Also uses Tonia Steady.  He also sits at the edge of his hospital bed.    Rehab Potential: good  for baseline    Past medical history:   Past Medical History:   Diagnosis Date    Anticoagulant long-term use     Arthritis     Atrial

## 2025-05-28 NOTE — PROGRESS NOTES
Occupational Therapy  OCCUPATIONAL THERAPY TREATMENT NOTE    [facility]  [address] [facility]  [address] [place], [place]   OT BEDSIDE TREATMENT NOTE      Date:2025  Patient Name: Deyvi Rose  MRN: [id number]  : 1934  Room: [address] [place] [address]     Evaluating OT: Kimi London, OTR/L; [id number]        Referring Provider and Orders/Date:        OT eval and treat  Start:  25,   End:  25,   ONE TIME,   Standing Count:  1 Occurrences,   R       Donell, Sharyn H, DO     Recommended placement: Would benefit from subacute rehab or HH therapy with HHA and family assist . Either one dependent on home assistance levels        Diagnosis:   1. Acute on chronic respiratory failure with hypoxia (HCC)    2. Sepsis, due to unspecified organism, unspecified whether acute organ dysfunction present (HCC)    3. Altered mental status, unspecified altered mental status type          Surgery: none      Pertinent Medical History:        Past Medical History        Past Medical History:   Diagnosis Date    Anticoagulant long-term use      Arthritis      Atrial fibrillation (HCC)      Atrial fibrillation, chronic (HCC)      Cancer (HCC)      Erectile dysfunction      GERD (gastroesophageal reflux disease)      Hearing loss      HTN (hypertension)      Neuropathy      Osteoarthritis      Peripheral vascular disease      Sleep apnea               Past Surgical History         Past Surgical History:   Procedure Laterality Date    CARDIOVERSION   2023     Successful   Dr Laughlin    CARDIOVERSION   2023     Successful  Dr. Laughlin    CARDIOVERSION   2023     Successful  Dr Laughlin    CARPAL TUNNEL RELEASE Right 2017    CARPAL TUNNEL RELEASE Left 2017    COLECTOMY         for polyps    COLONOSCOPY        ELBOW SURGERY         tendon reattachment    EP DEVICE PROCEDURE N/A 2024     Elsa nayak

## 2025-05-29 VITALS
BODY MASS INDEX: 39.35 KG/M2 | RESPIRATION RATE: 18 BRPM | DIASTOLIC BLOOD PRESSURE: 72 MMHG | OXYGEN SATURATION: 94 % | HEIGHT: 67 IN | WEIGHT: 250.7 LBS | HEART RATE: 95 BPM | TEMPERATURE: 99.1 F | SYSTOLIC BLOOD PRESSURE: 132 MMHG

## 2025-05-29 PROBLEM — E87.70 HYPERVOLEMIA: Status: ACTIVE | Noted: 2025-05-29

## 2025-05-29 PROBLEM — K59.00 CONSTIPATION: Status: ACTIVE | Noted: 2025-05-29

## 2025-05-29 PROBLEM — E87.6 HYPOKALEMIA: Status: ACTIVE | Noted: 2025-05-29

## 2025-05-29 LAB
ALBUMIN SERPL-MCNC: 2.8 G/DL (ref 3.5–5.2)
ALP SERPL-CCNC: 77 U/L (ref 40–129)
ALT SERPL-CCNC: 29 U/L (ref 0–40)
ANION GAP SERPL CALCULATED.3IONS-SCNC: 11 MMOL/L (ref 7–16)
AST SERPL-CCNC: 33 U/L (ref 0–39)
BASOPHILS # BLD: 0.07 K/UL (ref 0–0.2)
BASOPHILS NFR BLD: 1 % (ref 0–2)
BILIRUB SERPL-MCNC: 0.6 MG/DL (ref 0–1.2)
BUN SERPL-MCNC: 17 MG/DL (ref 6–23)
CALCIUM SERPL-MCNC: 9.1 MG/DL (ref 8.6–10.2)
CHLORIDE SERPL-SCNC: 98 MMOL/L (ref 98–107)
CO2 SERPL-SCNC: 31 MMOL/L (ref 22–29)
CREAT SERPL-MCNC: 0.5 MG/DL (ref 0.7–1.2)
EOSINOPHIL # BLD: 0.31 K/UL (ref 0.05–0.5)
EOSINOPHILS RELATIVE PERCENT: 2 % (ref 0–6)
ERYTHROCYTE [DISTWIDTH] IN BLOOD BY AUTOMATED COUNT: 16 % (ref 11.5–15)
GFR, ESTIMATED: >90 ML/MIN/1.73M2
GLUCOSE SERPL-MCNC: 114 MG/DL (ref 74–99)
HCT VFR BLD AUTO: 40.9 % (ref 37–54)
HGB BLD-MCNC: 12.6 G/DL (ref 12.5–16.5)
IMM GRANULOCYTES # BLD AUTO: 0.36 K/UL (ref 0–0.58)
IMM GRANULOCYTES NFR BLD: 3 % (ref 0–5)
LYMPHOCYTES NFR BLD: 1.57 K/UL (ref 1.5–4)
LYMPHOCYTES RELATIVE PERCENT: 11 % (ref 20–42)
MAGNESIUM SERPL-MCNC: 2 MG/DL (ref 1.6–2.6)
MCH RBC QN AUTO: 27.6 PG (ref 26–35)
MCHC RBC AUTO-ENTMCNC: 30.8 G/DL (ref 32–34.5)
MCV RBC AUTO: 89.5 FL (ref 80–99.9)
MONOCYTES NFR BLD: 1.44 K/UL (ref 0.1–0.95)
MONOCYTES NFR BLD: 11 % (ref 2–12)
NEUTROPHILS NFR BLD: 73 % (ref 43–80)
NEUTS SEG NFR BLD: 9.97 K/UL (ref 1.8–7.3)
PLATELET, FLUORESCENCE: 189 K/UL (ref 130–450)
PMV BLD AUTO: 13.2 FL (ref 7–12)
POTASSIUM SERPL-SCNC: 3.6 MMOL/L (ref 3.5–5)
PROT SERPL-MCNC: 5.8 G/DL (ref 6.4–8.3)
RBC # BLD AUTO: 4.57 M/UL (ref 3.8–5.8)
SODIUM SERPL-SCNC: 140 MMOL/L (ref 132–146)
WBC OTHER # BLD: 13.7 K/UL (ref 4.5–11.5)

## 2025-05-29 PROCEDURE — 6370000000 HC RX 637 (ALT 250 FOR IP): Performed by: FAMILY MEDICINE

## 2025-05-29 PROCEDURE — 6360000002 HC RX W HCPCS: Performed by: FAMILY MEDICINE

## 2025-05-29 PROCEDURE — 94660 CPAP INITIATION&MGMT: CPT

## 2025-05-29 PROCEDURE — 99239 HOSP IP/OBS DSCHRG MGMT >30: CPT | Performed by: FAMILY MEDICINE

## 2025-05-29 PROCEDURE — 2500000003 HC RX 250 WO HCPCS: Performed by: FAMILY MEDICINE

## 2025-05-29 PROCEDURE — 2580000003 HC RX 258: Performed by: EMERGENCY MEDICINE

## 2025-05-29 PROCEDURE — 2500000003 HC RX 250 WO HCPCS: Performed by: INTERNAL MEDICINE

## 2025-05-29 PROCEDURE — 6360000002 HC RX W HCPCS: Performed by: INTERNAL MEDICINE

## 2025-05-29 PROCEDURE — 36415 COLL VENOUS BLD VENIPUNCTURE: CPT

## 2025-05-29 PROCEDURE — 83735 ASSAY OF MAGNESIUM: CPT

## 2025-05-29 PROCEDURE — 6360000002 HC RX W HCPCS: Performed by: EMERGENCY MEDICINE

## 2025-05-29 PROCEDURE — 85025 COMPLETE CBC W/AUTO DIFF WBC: CPT

## 2025-05-29 PROCEDURE — 80053 COMPREHEN METABOLIC PANEL: CPT

## 2025-05-29 PROCEDURE — 2700000000 HC OXYGEN THERAPY PER DAY

## 2025-05-29 RX ORDER — PREGABALIN 100 MG/1
100 CAPSULE ORAL 2 TIMES DAILY
Qty: 60 CAPSULE | Refills: 0
Start: 2025-05-29 | End: 2025-06-28

## 2025-05-29 RX ORDER — NEOMYCIN SULFATE, POLYMYXIN B SULFATE AND DEXAMETHASONE 3.5; 10000; 1 MG/ML; [USP'U]/ML; MG/ML
1 SUSPENSION/ DROPS OPHTHALMIC 2 TIMES DAILY
Qty: 5 ML | Refills: 0 | Status: SHIPPED | OUTPATIENT
Start: 2025-05-29

## 2025-05-29 RX ADMIN — ASPIRIN 81 MG CHEWABLE TABLET 81 MG: 81 TABLET CHEWABLE at 08:27

## 2025-05-29 RX ADMIN — AZITHROMYCIN DIHYDRATE 500 MG: 500 INJECTION, POWDER, LYOPHILIZED, FOR SOLUTION INTRAVENOUS at 04:08

## 2025-05-29 RX ADMIN — SENNOSIDES 8.6 MG: 8.6 TABLET, FILM COATED ORAL at 08:29

## 2025-05-29 RX ADMIN — Medication 10 ML: at 08:28

## 2025-05-29 RX ADMIN — NEOMYCIN SULFATE, POLYMYXIN B SULFATE AND DEXAMETHASONE 1 DROP: 3.5; 10000; 1 SUSPENSION/ DROPS OPHTHALMIC at 12:06

## 2025-05-29 RX ADMIN — WATER 1000 MG: 1 INJECTION INTRAMUSCULAR; INTRAVENOUS; SUBCUTANEOUS at 08:30

## 2025-05-29 RX ADMIN — ALLOPURINOL 100 MG: 100 TABLET ORAL at 08:27

## 2025-05-29 RX ADMIN — NEOMYCIN SULFATE, POLYMYXIN B SULFATE AND DEXAMETHASONE 1 DROP: 3.5; 10000; 1 SUSPENSION/ DROPS OPHTHALMIC at 04:08

## 2025-05-29 RX ADMIN — PREGABALIN 100 MG: 100 CAPSULE ORAL at 08:27

## 2025-05-29 RX ADMIN — METOPROLOL SUCCINATE 25 MG: 25 TABLET, FILM COATED, EXTENDED RELEASE ORAL at 08:29

## 2025-05-29 RX ADMIN — FUROSEMIDE 20 MG: 10 INJECTION, SOLUTION INTRAMUSCULAR; INTRAVENOUS at 08:27

## 2025-05-29 RX ADMIN — ENOXAPARIN SODIUM 30 MG: 100 INJECTION SUBCUTANEOUS at 08:28

## 2025-05-29 RX ADMIN — CYANOCOBALAMIN TAB 1000 MCG 250 MCG: 1000 TAB at 08:27

## 2025-05-29 RX ADMIN — CALCIUM CARBONATE 500 MG: 500 TABLET, CHEWABLE ORAL at 08:27

## 2025-05-29 RX ADMIN — CHOLECALCIFEROL TAB 125 MCG (5000 UNIT) 5000 UNITS: 125 TAB at 08:27

## 2025-05-29 NOTE — PROGRESS NOTES
Pulse oximetry assessment   95% at rest on room air (if 88% or less, skip next steps)  Patient drops to 87% when 02 removed at rest.   Patient recovers to 95% when 1L nasal canula applied.   Patient is bed bound.

## 2025-05-29 NOTE — PLAN OF CARE
Problem: Safety - Adult  Goal: Free from fall injury  5/29/2025 1022 by Josselyn Barrett RN  Outcome: Progressing     Problem: Chronic Conditions and Co-morbidities  Goal: Patient's chronic conditions and co-morbidity symptoms are monitored and maintained or improved  5/29/2025 1022 by Josselyn Barrett RN  Outcome: Progressing     Problem: Discharge Planning  Goal: Discharge to home or other facility with appropriate resources  5/29/2025 1022 by Josselyn Barrett RN  Outcome: Progressing     Problem: Pain  Goal: Verbalizes/displays adequate comfort level or baseline comfort level  5/29/2025 1022 by Josselyn Barrett RN  Outcome: Progressing     Problem: Skin/Tissue Integrity  Goal: Skin integrity remains intact  Description: 1.  Monitor for areas of redness and/or skin breakdown2.  Assess vascular access sites hourly3.  Every 4-6 hours minimum:  Change oxygen saturation probe site4.  Every 4-6 hours:  If on nasal continuous positive airway pressure, respiratory therapy assess nares and determine need for appliance change or resting period  5/29/2025 1022 by Josselyn Barrett RN  Outcome: Progressing     Problem: ABCDS Injury Assessment  Goal: Absence of physical injury  5/29/2025 1022 by Josselyn Barrett RN  Outcome: Progressing     Problem: Neurosensory - Adult  Goal: Achieves maximal functionality and self care  5/28/2025 2217 by Amy Lopze RN  Outcome: Progressing     Problem: Respiratory - Adult  Goal: Achieves optimal ventilation and oxygenation  5/29/2025 1022 by Josselyn Barrett RN  Outcome: Progressing     Problem: Skin/Tissue Integrity - Adult  Goal: Skin integrity remains intact  Description: 1.  Monitor for areas of redness and/or skin breakdown2.  Assess vascular access sites hourly3.  Every 4-6 hours minimum:  Change oxygen saturation probe site4.  Every 4-6 hours:  If on nasal continuous positive airway pressure, respiratory therapy assess nares and determine need for appliance change or resting period  5/29/2025 1022 by

## 2025-05-29 NOTE — DISCHARGE INSTRUCTIONS
Your information:  Name: Deyvi Rose  : 1934    Your instructions:  Discharge Home with [facility]    What to do after you leave the hospital:    Recommended diet: regular diet    Recommended activity: activity as tolerated    The following personal items were collected during your admission and were returned to you:    Belongings  Dental Appliances: Uppers, Lowers  Vision - Corrective Lenses: None  Hearing Aid: Right hearing aid, Left hearing aid  Clothing: Sent home  Jewelry: None  Electronic Devices: None  Weapons (Notify Protective Services/Security): None  Home Medications: None  Valuables Given To: Family (Comment)  Provide Name(s) of Who Valuable(s) Were Given To: Debbie (wife)    Information obtained by:  By signing below, I understand that if any problems occur once I leave the hospital I am to contact PCP.  I understand and acknowledge receipt of the instructions indicated above.   Oxygen Therapy: Care Instructions  Overview  Oxygen therapy helps you get more oxygen into your lungs and bloodstream. You may use it if you have a disease that makes it hard to breathe, such as COPD, pulmonary fibrosis (scarring of the lungs), or heart failure. Oxygen therapy can make it easier for you to breathe and can reduce your heart's workload.  Some people need extra oxygen all the time. Others need it from time to time throughout the day or overnight. A doctor will prescribe how much oxygen you need and how often to use it.  To breathe the oxygen, most people use a nasal cannula (say \"CALOS-yuh-freedom\"). This is a thin, plastic tube with two prongs that fit just inside your nose. People who need a lot of oxygen may need to use a mask that fits over the nose and mouth.  Follow-up care is a key part of your treatment and safety. Be sure to make and go to all appointments, and call your doctor if you are having problems. It's also a good idea to know your test results and keep a list of the medicines you

## 2025-05-29 NOTE — PROGRESS NOTES
[facility]  [facility]  Progress Note    Chief complain:  pneumonia    SUBJECTIVE:    Patient is awake, alert , tolerating antibiotics well , Sitting up in bed. Family present.   ROS:  Constitutional:  denies fever , chills   Cardiovascular: denies chest pain or palpitation   Gastrointestinal: . Denies abdominal pain, diarrhea, no nausea or vomiting  Genitourinary:      Denies  dysuria or burning micturition  Musculoskeletal: no aches or pain   Allergic/Immunologic:  No rash or itching     OBJECTIVE:    Vitals:    05/29/25 0816 05/29/25 0827 05/29/25 0829 05/29/25 1130   BP:  132/72     Pulse:   95    Resp:       Temp:       TempSrc:       SpO2: 95%   94%   Weight:       Height:           HEENT :         unremarkable   Heart:             RRR,  No murmurs, no gallops  Lungs:            clear to auscultation, no wheezing , no rales  Abdomen:       soft, non tender, bowel sounds present  Extremites:     No edema, no ulcers,  Skin:                Normal turgor,normal texture  Lines :             peripheral              Pizarro catheter :   absent  Wound : none present                        Current Facility-Administered Medications   Medication Dose Route Frequency Provider Last Rate Last Admin    enoxaparin Sodium (LOVENOX) injection 30 mg  30 mg SubCUTAneous BID Sharyn Marley, DO   30 mg at 05/29/25 0828    furosemide (LASIX) injection 20 mg  20 mg IntraVENous BID Cheryl Del Rio DO   20 mg at 05/29/25 0827    neomycin-polymyxin-dexameth (MAXITROL) 3.5-68057-2.1 ophthalmic suspension 1 drop  1 drop Both Eyes 4 times per day Cheryl Del Rio DO   1 drop at 05/29/25 1206    senna (SENOKOT) tablet 8.6 mg  1 tablet Oral BID Cheryl Del Rio DO   8.6 mg at 05/29/25 0829    Polyvinyl Alcohol-Povidone PF (REFRESH) 1.4-0.6 % ophthalmic solution 2 drop  2 drop Both Eyes PRN Eliecer White MD   2 drop at 05/28/25 0809    pregabalin (LYRICA) capsule 100 mg  100 mg Oral BID

## 2025-05-29 NOTE — PROGRESS NOTES
[facility] Hospitalist Progress Note    Admitting Date and Time: 5/22/2025  8:52 PM  Admit Dx: Acute on chronic respiratory failure with hypoxia ([facility]) [J96.21]  Altered mental status, unspecified altered mental status type [R41.82]  Sepsis, due to unspecified organism, unspecified whether acute organ dysfunction present ([facility]) [A41.9]  Concerned about care plan [Z75.8]  Acute on chronic diastolic CHF (congestive heart failure) ([facility]) [I50.33]      Assessment:    Principal Problem:    Acute on chronic diastolic CHF (congestive heart failure) ([facility])  Active Problems:    Presence of Watchman left atrial appendage closure device    Persistent atrial fibrillation ([facility])    Chronic heart failure with preserved ejection fraction (HFpEF) ([facility])    Concerned about care plan  Resolved Problems:    * No resolved hospital problems. *      Plan:  5/23/2025  Fever  Leukocytosis  SOB  - prn duonebs  - f/u respiratory panel  - admitted in March with RSV and mycoplasma pneumonia  - no pneumonia on imaging, will observe off antibiotics     CHF  - lasix 40mg daily  - intake/output  - daily weight     HTN  - normotensive in ED  - trend BP     Chronic AFib  S/p watchman device  - rate control with metoprolol     Sob, fever, inc wbc: Procalcitonin, Respiratory panel mycoplasma IgM urinary antigens for strep and Legionella procalcitonin high at .32.  Supplemental oxygen not documented?     Chf hx? 2d echo 7/24 ef 60% mild valvular abnl, moderate LVH, possible pericardial effusion. Lasix  40 qd  Hypertension so far has been normal blood pressure since arrival  Chronic A-fib status post watchman is on metoprolol continue  DVT prophylaxis: Lovenox  Full code.  Disposition: Home in 2 or 3 days    5/24/2025   Sob, fever, inc wbc: urinary antigens for strep and Legionella  (-).   Respiratory panel (-) Procalcitonin high at .32.  mycoplasma IgM pending. Supplemental oxygen not documented on 5/23 but seen by me during patient visit.

## 2025-05-29 NOTE — DISCHARGE SUMMARY
Discharge Summary  Patient ID:  Deyvi Rose  [id number]  89 y.o. 4/14/1934 male  Heather Ascencio MD        Admit date: 5/22/2025    Discharge date and time:  5/29/2025  11:33 AM      Activity level: baseline to advance as able/tolerated, bedridden at baseline  Diet:regular diet  Labs:at discretion of pt pcp at f/u  Disposition:home with [facility]  Condition on Discharge:stable, fair, at high risk for readmission if noncompliant with BIPAP  DME: none    Admit Diagnoses:   Patient Active Problem List   Diagnosis    DAT (obstructive sleep apnea)    Generalized osteoarthritis    Mixed hyperlipidemia    Persistent atrial fibrillation (HCC)    Gait disorder    Benign prostatic hyperplasia without lower urinary tract symptoms    Glucose intolerance (impaired glucose tolerance)    Chronic combined systolic and diastolic congestive heart failure (HCC)    Neuropathy    Cellulitis    Presence of Watchman left atrial appendage closure device    Idiopathic chronic gout without tophus    Idiopathic peripheral neuropathy    Chronic heart failure with preserved ejection fraction (HFpEF) ([facility])    Concerned about care plan    Acute on chronic diastolic CHF (congestive heart failure) ([facility])       Discharge Diagnoses: Principal Problem:    Acute on chronic diastolic CHF (congestive heart failure) ([facility])  Active Problems:    Presence of Watchman left atrial appendage closure device    Persistent atrial fibrillation (HCC)    Chronic heart failure with preserved ejection fraction (HFpEF) ([facility])    Concerned about care plan  Resolved Problems:    * No resolved hospital problems. *      Consults:  IP CONSULT TO SOCIAL WORK  IP CONSULT TO INFECTIOUS DISEASES    Procedures:   none    Hospital Course:   89 y.o. male with a history of AFib s/p watchman, chronic 2L O2, HTN, CHF, PVD presents with confusion, SOB.  Patient is a poor historian.  He does report a cough.  Per ED documentation patient had SOB earlier which resolved with aerosol by EMS.   provider to review them with you.                STOP taking these medications      vitamin B-12 250 MCG tablet  Commonly known as: CYANOCOBALAMIN            ASK your doctor about these medications      therapeutic multivitamin-minerals tablet     vitamin D 125 MCG (5000 UT) Caps capsule  Commonly known as: CHOLECALCIFEROL               Where to Get Your Medications        You can get these medications from any pharmacy    Bring a paper prescription for each of these medications  pregabalin 100 MG capsule       Information about where to get these medications is not yet available    Ask your nurse or doctor about these medications  neomycin-polymyxin-dexameth 3.5-80212-9.1 ophthalmic suspension  Polyvinyl Alcohol-Povidone PF 1.4-0.6 % Soln ophthalmic solution  senna 8.6 MG tablet           Note that greater than 30 minutes was spent in preparing discharge papers, discussing discharge with patient, medication review, etc.      Signed:  Electronically signed by Cheryl Del Rio DO on 5/29/2025 at 11:33 AM

## 2025-05-29 NOTE — PLAN OF CARE
Problem: Safety - Adult  Goal: Free from fall injury  5/28/2025 2217 by Amy Lopez RN  Outcome: Progressing  5/28/2025 0826 by Josselyn Barrett RN  Outcome: Progressing     Problem: Chronic Conditions and Co-morbidities  Goal: Patient's chronic conditions and co-morbidity symptoms are monitored and maintained or improved  5/28/2025 2217 by Amy Lopez RN  Outcome: Progressing  5/28/2025 0826 by Josselyn Barrett RN  Outcome: Progressing     Problem: Discharge Planning  Goal: Discharge to home or other facility with appropriate resources  5/28/2025 2217 by Amy Lopez RN  Outcome: Progressing  5/28/2025 0826 by Josselyn Barrett RN  Outcome: Progressing     Problem: Pain  Goal: Verbalizes/displays adequate comfort level or baseline comfort level  5/28/2025 2217 by Amy Lopez RN  Outcome: Progressing  5/28/2025 0826 by Josselyn Barrett RN  Outcome: Progressing     Problem: Skin/Tissue Integrity  Goal: Skin integrity remains intact  Description: 1.  Monitor for areas of redness and/or skin breakdown2.  Assess vascular access sites hourly3.  Every 4-6 hours minimum:  Change oxygen saturation probe site4.  Every 4-6 hours:  If on nasal continuous positive airway pressure, respiratory therapy assess nares and determine need for appliance change or resting period  5/28/2025 2217 by Amy Lopez RN  Outcome: Progressing  5/28/2025 0826 by Josselyn Barrett RN  Outcome: Progressing     Problem: ABCDS Injury Assessment  Goal: Absence of physical injury  5/28/2025 2217 by Amy Lopez RN  Outcome: Progressing  5/28/2025 0826 by Josselyn Barrett RN  Outcome: Progressing     Problem: Neurosensory - Adult  Goal: Achieves maximal functionality and self care  5/28/2025 2217 by Amy Lopez RN  Outcome: Progressing  5/28/2025 0826 by Josselyn Barrett RN  Outcome: Progressing     Problem: Respiratory - Adult  Goal: Achieves optimal ventilation and oxygenation  5/28/2025 2217 by Amy Lopez RN  Outcome: Progressing  5/28/2025 0826

## 2025-05-29 NOTE — CARE COORDINATION
5/29/2025: SS NOTE:  Received order for home 02, pt active with [employer] for home CPAP, referral sent through [employer] and confirmed with cindy Strong that company will deliver pt's home 02 between 1:30-2pm, met with pt's wife in her 's room to confirm d/c plan, she will notify [facility] to resume pt's HHC aides and have his hours increased, says he is eligible for 10 additional hours per week, Mrs Rose returning home to get his room ready and to be there when his oxygen arrives, sw notified Manuel liaison for [facility] of [facility] therapy orders and to add SN visit for CP assessment. PAS via stretcher scheduled for 2:00 pm transfer to return home, Nursing notified.Electronically signed by FELICIA Robb on 5/29/2025 at 1:12 PM

## 2025-05-29 NOTE — CARE COORDINATION
5/28/2025: SS NOTE:  Notified by Olamide, liaison for [facility] that pt does NOT have a skilled need for [facility] admission and coverage under his [employer] insurance, could assess pt for admission under his [facility] insurance for placement at their [facility] facility, [facility] however informed by Treva at the [facility] that pt is NOT service connected for placement under the [facility] at a local SNF, she could have pt evaluated for RESPITE STAY at [facility] if pt's wife agreeable, sw spoke with Mrs. Rose and reviewed options, she prefers to bring her  home with [facility] and resume her HHC aide services with [facility] as prior, pt has a CPAP through [facility] but did not have home 02 prior to admission, pt has a hospital bed w/ paul lift, will need ambulance transport home, PAS placed on a \"WILL CALL\", will need [facility] HHC orders at discharge, Nursing staff informed.Electronically signed by FELICIA Robb on 5/29/2025 at 11:23 AM

## 2025-05-30 ENCOUNTER — CARE COORDINATION (OUTPATIENT)
Dept: CARE COORDINATION | Age: 89
End: 2025-05-30

## 2025-05-30 DIAGNOSIS — I50.33 ACUTE ON CHRONIC DIASTOLIC CHF (CONGESTIVE HEART FAILURE) (HCC): Primary | ICD-10-CM

## 2025-05-30 PROCEDURE — 1111F DSCHRG MED/CURRENT MED MERGE: CPT | Performed by: INTERNAL MEDICINE

## 2025-05-30 NOTE — CARE COORDINATION
[facility]  Do you have support at home?: Partner/Spouse/SO  Are you an active caregiver in your home?: No  Care Transitions Interventions     Other Therapy Services: Completed     Physical Therapy: Completed      Registered Dietician: Declined DME Assistance: Completed             Follow Up Appointment:   Patient does not have a follow up appointment scheduled at time of call. Prefers to self-schedule YIMI appointment.   Future Appointments         Provider Specialty Dept Phone    6/4/2025 11:30 AM (Arrive by 11:15 AM) [facility]; [facility] Cardiology [phone]    6/10/2025 11:30 AM Dequan Bates MD Cardiology [phone]    6/16/2025 9:45 AM [facility] CHF ROOM 3 Cardiology [phone]    7/10/2025 2:30 PM Heather Ascencio MD Primary Care [phone]            Care Transition Nurse provided contact information.  Plan for follow-up call in 2-5 days based on severity of symptoms and risk factors.  Plan for next call: symptom management-did PCP respond about inhaler, neb/solution? 02 at 2 L?       Leah Mondragon RN       Neuro

## 2025-06-02 DIAGNOSIS — I48.19 PERSISTENT ATRIAL FIBRILLATION (HCC): Primary | ICD-10-CM

## 2025-06-02 DIAGNOSIS — Z95.818 PRESENCE OF WATCHMAN LEFT ATRIAL APPENDAGE CLOSURE DEVICE: ICD-10-CM

## 2025-06-03 ENCOUNTER — TELEPHONE (OUTPATIENT)
Dept: CARDIOLOGY CLINIC | Age: 89
End: 2025-06-03

## 2025-06-03 ENCOUNTER — TELEPHONE (OUTPATIENT)
Age: 89
End: 2025-06-03

## 2025-06-03 NOTE — TELEPHONE ENCOUNTER
Left voicemail with wife Debbie.  CHEKO is scheduled on 7/17/25, 10:30, follow up office visit with Dr. Bates is 8/5/25, 1330.

## 2025-06-03 NOTE — TELEPHONE ENCOUNTER
I called Kenyetta; Deyvi's wife, called to notify her that I rescheduled Deyvi's CHEKO on 25.  She said he  on 25.  Condolences given.

## 2025-06-03 NOTE — PROGRESS NOTES
Physician Progress Note      PATIENT:               ANDREA PRESCOTT  CSN #:                  [id number]  :                       1934  ADMIT DATE:       2025 8:52 PM  DISCH DATE:        2025 5:12 PM  RESPONDING  PROVIDER #:        Cheryl Del Rio DO          QUERY TEXT:    Noted documentation of sepsis on ID pnotes  by JANUSZ Arenas, ID   consultant.    The clinical indicators include:  -presented with shortness of breath and confusion, cough/fever/leukocytosis   noted  (H/P  Dr. Marley)  -WBC 16.8 with LA 2.1 (Lab )  -T 101.2, HR , R 27-32 (VS record )  -NS 1000 ml/iV bolus, blood cultures, LA and repeat, Rocephin IV (Orders )  -ID consult (Orders )  Options provided:  -- Sepsis confirmed present on admission  -- Sepsis ruled out  -- Defer to ID consultant documentation regarding sepsis  -- Other - I will add my own diagnosis  -- Disagree - Not applicable / Not valid  -- Disagree - Clinically unable to determine / Unknown  -- Refer to Clinical Documentation Reviewer    PROVIDER RESPONSE TEXT:    The diagnosis of sepsis was confirmed to be present on admission.    Query created by: Marlon Ortega on 2025 8:33 AM      Electronically signed by:  Cheryl Del Rio DO 6/3/2025 3:57 PM           Strong/Good Relaxation between Contractions

## (undated) DEVICE — PEN: MARKING STD 100/CS: Brand: MEDICAL ACTION INDUSTRIES

## (undated) DEVICE — ACCESS SHEATH WITH DILATOR: Brand: WATCHMAN FXD CURVE™ ACCESS SYSTEM

## (undated) DEVICE — GAUZE,SPONGE,4"X4",16PLY,XRAY,STRL,LF: Brand: MEDLINE

## (undated) DEVICE — TUBING PRSS MON L12IN PVC RIG NONEXPANDING M TO FEM CONN

## (undated) DEVICE — MEDI-VAC NON-CONDUCTIVE SUCTION TUBING: Brand: CARDINAL HEALTH

## (undated) DEVICE — SOLUTION IV IRRIG POUR BRL 0.9% SODIUM CHL 2F7124

## (undated) DEVICE — Z CONVERTED USE 2275207 CLOTH PREP W7.5XL7.5IN 2% CHG SKIN ALC AND RNS FREE

## (undated) DEVICE — TUBING PRSS MON L48IN PVC RIG NONEXPANDING M TO FEM CONN

## (undated) DEVICE — 12 ML SYRINGE,LUER-LOCK TIP: Brand: MONOJECT

## (undated) DEVICE — PRESSURE MONITORING SET: Brand: TRUWAVE

## (undated) DEVICE — BASIC PACK: Brand: CONVERTORS

## (undated) DEVICE — SHEATH INTRO 6FR L11CM 0.038IN SIL TRICSP VLV W/ GWIRE

## (undated) DEVICE — GUIDEWIRE ANGIO L150CM OD0.035IN STR FIX PTFE SLD SUPP

## (undated) DEVICE — DRILL BIT

## (undated) DEVICE — GAUZE,SPONGE,4"X4",16PLY,STRL,LF,10/TRAY: Brand: MEDLINE

## (undated) DEVICE — TOWEL OR BLUEE 16X26IN ST 8 PACK ORB08 16X26ORTWL

## (undated) DEVICE — NEEDLE HYPO 18GA L1.5IN PNK POLYPR HUB S STL THN WALL FILL

## (undated) DEVICE — TIBURON EXTREMITY SHEET: Brand: CONVERTORS

## (undated) DEVICE — PERCLOSE™ PROSTYLE™ SUTURE-MEDIATED CLOSURE AND REPAIR SYSTEM: Brand: PERCLOSE™ PROSTYLE™

## (undated) DEVICE — DRAPE 84X54IN RADIOLOGY C ARM KEYBOARD

## (undated) DEVICE — NEEDLE HYPO 25GA L1.5IN BLU POLYPR HUB S STL REG BVL STR

## (undated) DEVICE — SOLUTION IRRIG 1000ML 09% SOD CHL USP PIC PLAS CONTAINER

## (undated) DEVICE — HOOK LOCK LATEX FREE ELASTIC BANDAGE 3INX5YD

## (undated) DEVICE — GLOVE SURG 7.5 LTX TRIFLEX WIDE FINGER PWDR

## (undated) DEVICE — INTENDED FOR TISSUE SEPARATION, AND OTHER PROCEDURES THAT REQUIRE A SHARP SURGICAL BLADE TO PUNCTURE OR CUT.: Brand: BARD-PARKER ® STAINLESS STEEL BLADES

## (undated) DEVICE — SURGICAL PROCEDURE TRAY EPIDURAL CUST

## (undated) DEVICE — SURGICAL PROCEDURE KIT 3 W

## (undated) DEVICE — CATHETER 5FR PIGSTR MEDTRONIC 110CM

## (undated) DEVICE — 1 X VERSACROSS TRANSSEPTAL SHEATH (INCLUDING  1 X J-TIP GUIDEWIRE); 1 X VERSACROSS RF WIRE (INCLUDING 1 X CONNECTOR CABLE (SINGLE USE)); 1 X DISPERSIVE ELECTRODE: Brand: VERSACROSS ACCESS SOLUTION

## (undated) DEVICE — ENCORE® LATEX TEXTURED SIZE 7.5, STERILE LATEX POWDER-FREE SURGICAL GLOVE: Brand: ENCORE

## (undated) DEVICE — NEEDLE ANGIO 1 WALL STYL 18 GAX70 CM THN ADV

## (undated) DEVICE — DRESSING GZ XRFRM 4X4(25/BX 6BX/CS)

## (undated) DEVICE — SYRINGE BLB 50CC IRRIG PLIABLE FNGR FLNG GRAD FLSK DISP

## (undated) DEVICE — SPONGE LAP W18XL18IN WHT COT 4 PLY FLD STRUNG RADPQ DISP ST

## (undated) DEVICE — HANDLE CVR PATENTED RETENTION DISC STRL LIGHT SHLD

## (undated) DEVICE — CHLORAPREP 26ML ORANGE

## (undated) DEVICE — 1810 FOAM BLOCK NEEDLE COUNTER: Brand: DEVON

## (undated) DEVICE — SINGLE TROCAR WIRE: Brand: CHARLOTTE

## (undated) DEVICE — PAD, DEFIB, ADULT, RADIOTRAN, PHYSIO, LO: Brand: MEDLINE

## (undated) DEVICE — PLATE TACK

## (undated) DEVICE — Device